# Patient Record
Sex: MALE | Race: WHITE
[De-identification: names, ages, dates, MRNs, and addresses within clinical notes are randomized per-mention and may not be internally consistent; named-entity substitution may affect disease eponyms.]

---

## 2019-01-15 ENCOUNTER — HOSPITAL ENCOUNTER (INPATIENT)
Dept: HOSPITAL 91 - TEL | Age: 74
LOS: 7 days | Discharge: HOME HEALTH | DRG: 308 | End: 2019-01-22
Payer: COMMERCIAL

## 2019-01-15 ENCOUNTER — HOSPITAL ENCOUNTER (INPATIENT)
Dept: HOSPITAL 10 - E/R | Age: 74
LOS: 7 days | Discharge: HOME HEALTH | DRG: 308 | End: 2019-01-22
Attending: INTERNAL MEDICINE | Admitting: INTERNAL MEDICINE
Payer: COMMERCIAL

## 2019-01-15 VITALS
HEIGHT: 68 IN | WEIGHT: 132.94 LBS | HEIGHT: 68 IN | BODY MASS INDEX: 20.15 KG/M2 | BODY MASS INDEX: 20.15 KG/M2 | WEIGHT: 132.94 LBS

## 2019-01-15 DIAGNOSIS — I48.91: Primary | ICD-10-CM

## 2019-01-15 DIAGNOSIS — I47.2: ICD-10-CM

## 2019-01-15 DIAGNOSIS — I50.23: ICD-10-CM

## 2019-01-15 DIAGNOSIS — I25.10: ICD-10-CM

## 2019-01-15 DIAGNOSIS — N17.9: ICD-10-CM

## 2019-01-15 DIAGNOSIS — R09.02: ICD-10-CM

## 2019-01-15 DIAGNOSIS — R51: ICD-10-CM

## 2019-01-15 DIAGNOSIS — I11.0: ICD-10-CM

## 2019-01-15 DIAGNOSIS — M48.54XA: ICD-10-CM

## 2019-01-15 DIAGNOSIS — F17.200: ICD-10-CM

## 2019-01-15 DIAGNOSIS — I24.8: ICD-10-CM

## 2019-01-15 DIAGNOSIS — I71.4: ICD-10-CM

## 2019-01-15 LAB
ADD MAN DIFF?: NO
ALANINE AMINOTRANSFERASE: 49 IU/L (ref 13–69)
ALBUMIN/GLOBULIN RATIO: 1.39
ALBUMIN: 3.9 G/DL (ref 3.3–4.9)
ALKALINE PHOSPHATASE: 2146 IU/L (ref 42–121)
AMPHETAMINE/METHAMPHETAMINE: NEGATIVE
ANION GAP: 13 (ref 5–13)
ASPARTATE AMINO TRANSFERASE: 138 IU/L (ref 15–46)
B-TYPE NATRIURETIC PEPTIDE: 5670 PG/ML (ref 0–125)
BARBITURATES: NEGATIVE
BASOPHIL #: 0 10^3/UL (ref 0–0.1)
BASOPHILS %: 0.3 % (ref 0–2)
BENZODIAZEPINES: NEGATIVE
BILIRUBIN,DIRECT: 0 MG/DL (ref 0–0.2)
BILIRUBIN,TOTAL: 0.9 MG/DL (ref 0.2–1.3)
BLOOD UREA NITROGEN: 52 MG/DL (ref 7–20)
CALCIUM: 9.5 MG/DL (ref 8.4–10.2)
CANNABINOIDS: NEGATIVE
CARBON DIOXIDE: 26 MMOL/L (ref 21–31)
CHLORIDE: 104 MMOL/L (ref 97–110)
CK INDEX: 2.9
CK-MB: 45.3 NG/ML (ref 0–2.4)
COCAINE: NEGATIVE
CREATINE KINASE: 1570 IU/L (ref 23–200)
CREATININE: 1.32 MG/DL (ref 0.61–1.24)
D-DIMER: 8518.92 NG/ML (ref ?–460)
EOSINOPHILS #: 0 10^3/UL (ref 0–0.5)
EOSINOPHILS %: 0 % (ref 0–7)
ETHANOL: < 10 MG/DL (ref 0–0)
FREE THYROXINE INDEX (CALC): 1.72 UG/ML (ref 0.65–3.89)
GLOBULIN: 2.8 G/DL (ref 1.3–3.2)
GLUCOSE: 94 MG/DL (ref 70–220)
HEMATOCRIT: 51.5 % (ref 42–52)
HEMOGLOBIN: 17.1 G/DL (ref 14–18)
IMMATURE GRANS #M: 0.05 10^3/UL (ref 0–0.03)
IMMATURE GRANS % (M): 0.8 % (ref 0–0.43)
INR: 1.19
LYMPHOCYTES #: 1.1 10^3/UL (ref 0.8–2.9)
LYMPHOCYTES %: 17.7 % (ref 15–51)
MEAN CORPUSCULAR HEMOGLOBIN: 30.9 PG (ref 29–33)
MEAN CORPUSCULAR HGB CONC: 33.2 G/DL (ref 32–37)
MEAN CORPUSCULAR VOLUME: 93.1 FL (ref 82–101)
MEAN PLATELET VOLUME: 12.4 FL (ref 7.4–10.4)
MONOCYTE #: 0.5 10^3/UL (ref 0.3–0.9)
MONOCYTES %: 7.9 % (ref 0–11)
NEUTROPHIL #: 4.5 10^3/UL (ref 1.6–7.5)
NEUTROPHILS %: 73.3 % (ref 39–77)
NUCLEATED RED BLOOD CELLS #: 0 10^3/UL (ref 0–0)
NUCLEATED RED BLOOD CELLS%: 0.3 /100WBC (ref 0–0)
OPIATES: NEGATIVE
PARTIAL THROMBOPLASTIN TIME: 27.4 SEC (ref 23–35)
PLATELET COUNT: 102 10^3/UL (ref 140–415)
POTASSIUM: 4.6 MMOL/L (ref 3.5–5.1)
PROTIME: 15.2 SEC (ref 11.9–14.9)
PT RATIO: 1.2
RED BLOOD COUNT: 5.53 10^6/UL (ref 4.7–6.1)
RED CELL DISTRIBUTION WIDTH: 15.7 % (ref 11.5–14.5)
SODIUM: 143 MMOL/L (ref 135–144)
T3 UPTAKE: 38.2 % (ref 23.5–40.5)
T4 (THYROXINE): 4.5 UG/DL (ref 5.5–11)
THYROID STIMULATING HORMONE: 4.07 MIU/L (ref 0.47–4.68)
TOTAL PROTEIN: 6.7 G/DL (ref 6.1–8.1)
TROPONIN-I: 0.09 NG/ML (ref 0–0.12)
TROPONIN-I: 0.09 NG/ML (ref 0–0.12)
WHITE BLOOD COUNT: 6.1 10^3/UL (ref 4.8–10.8)

## 2019-01-15 PROCEDURE — 82553 CREATINE MB FRACTION: CPT

## 2019-01-15 PROCEDURE — 80048 BASIC METABOLIC PNL TOTAL CA: CPT

## 2019-01-15 PROCEDURE — 78452 HT MUSCLE IMAGE SPECT MULT: CPT

## 2019-01-15 PROCEDURE — 83735 ASSAY OF MAGNESIUM: CPT

## 2019-01-15 PROCEDURE — 96374 THER/PROPH/DIAG INJ IV PUSH: CPT

## 2019-01-15 PROCEDURE — 84443 ASSAY THYROID STIM HORMONE: CPT

## 2019-01-15 PROCEDURE — 84484 ASSAY OF TROPONIN QUANT: CPT

## 2019-01-15 PROCEDURE — 80061 LIPID PANEL: CPT

## 2019-01-15 PROCEDURE — 84479 ASSAY OF THYROID (T3 OR T4): CPT

## 2019-01-15 PROCEDURE — 82803 BLOOD GASES ANY COMBINATION: CPT

## 2019-01-15 PROCEDURE — 36600 WITHDRAWAL OF ARTERIAL BLOOD: CPT

## 2019-01-15 PROCEDURE — 80307 DRUG TEST PRSMV CHEM ANLYZR: CPT

## 2019-01-15 PROCEDURE — 85378 FIBRIN DEGRADE SEMIQUANT: CPT

## 2019-01-15 PROCEDURE — 85610 PROTHROMBIN TIME: CPT

## 2019-01-15 PROCEDURE — 93017 CV STRESS TEST TRACING ONLY: CPT

## 2019-01-15 PROCEDURE — 82550 ASSAY OF CK (CPK): CPT

## 2019-01-15 PROCEDURE — 93306 TTE W/DOPPLER COMPLETE: CPT

## 2019-01-15 PROCEDURE — 93005 ELECTROCARDIOGRAM TRACING: CPT

## 2019-01-15 PROCEDURE — 84436 ASSAY OF TOTAL THYROXINE: CPT

## 2019-01-15 PROCEDURE — 84100 ASSAY OF PHOSPHORUS: CPT

## 2019-01-15 PROCEDURE — 90686 IIV4 VACC NO PRSV 0.5 ML IM: CPT

## 2019-01-15 PROCEDURE — 71275 CT ANGIOGRAPHY CHEST: CPT

## 2019-01-15 PROCEDURE — 83880 ASSAY OF NATRIURETIC PEPTIDE: CPT

## 2019-01-15 PROCEDURE — 85025 COMPLETE CBC W/AUTO DIFF WBC: CPT

## 2019-01-15 PROCEDURE — A9505 TL201 THALLIUM: HCPCS

## 2019-01-15 PROCEDURE — 71046 X-RAY EXAM CHEST 2 VIEWS: CPT

## 2019-01-15 PROCEDURE — 80053 COMPREHEN METABOLIC PANEL: CPT

## 2019-01-15 PROCEDURE — 71045 X-RAY EXAM CHEST 1 VIEW: CPT

## 2019-01-15 PROCEDURE — 70450 CT HEAD/BRAIN W/O DYE: CPT

## 2019-01-15 PROCEDURE — 85730 THROMBOPLASTIN TIME PARTIAL: CPT

## 2019-01-15 PROCEDURE — 96376 TX/PRO/DX INJ SAME DRUG ADON: CPT

## 2019-01-15 PROCEDURE — 99291 CRITICAL CARE FIRST HOUR: CPT

## 2019-01-15 PROCEDURE — A9500 TC99M SESTAMIBI: HCPCS

## 2019-01-15 PROCEDURE — 96375 TX/PRO/DX INJ NEW DRUG ADDON: CPT

## 2019-01-15 RX ADMIN — SODIUM CHLORIDE 1 ML: 9 INJECTION, SOLUTION INTRAMUSCULAR; INTRAVENOUS; SUBCUTANEOUS at 18:23

## 2019-01-15 RX ADMIN — DILTIAZEM HYDROCHLORIDE 1 MG: 5 INJECTION INTRAVENOUS at 19:50

## 2019-01-15 RX ADMIN — VASOPRESSIN 1 ML/S: 20 INJECTION, SOLUTION INTRAMUSCULAR; SUBCUTANEOUS at 18:23

## 2019-01-15 RX ADMIN — DILTIAZEM HYDROCHLORIDE 1 MG: 5 INJECTION INTRAVENOUS at 17:24

## 2019-01-15 RX ADMIN — DIGOXIN 1 MCG: 0.25 INJECTION INTRAMUSCULAR; INTRAVENOUS at 19:16

## 2019-01-15 RX ADMIN — DILTIAZEM HYDROCHLORIDE 1 MG: 5 INJECTION INTRAVENOUS at 18:50

## 2019-01-15 RX ADMIN — THIAMINE HYDROCHLORIDE 1 MLS/HR: 100 INJECTION, SOLUTION INTRAMUSCULAR; INTRAVENOUS at 19:58

## 2019-01-15 RX ADMIN — IODIXANOL 1 ML: 320 INJECTION, SOLUTION INTRAVASCULAR at 18:24

## 2019-01-15 RX ADMIN — DIGOXIN 1 MCG: 0.25 INJECTION INTRAMUSCULAR; INTRAVENOUS at 20:08

## 2019-01-15 RX ADMIN — FUROSEMIDE 1 MG: 10 INJECTION, SOLUTION INTRAVENOUS at 18:23

## 2019-01-15 NOTE — ERD
ER Documentation


Chief Complaint


Chief Complaint





SOB





HPI


The patient is of 73-year-old male, presenting with acute dyspnea for the last 


week, worse today.  He also complains of palpitation intermittently for the last


month, denies syncope, near syncope, facial pain, neck pain, chest pain, 


abdominal pain, vomiting, complains of constipation and dysuria.  He smokes, 


denies drinking





Past medical history: CAD, hypertension


Past surgical history: None





ROS


All systems reviewed and are negative except as per history of present illness.





Medications


Home Meds


Reported Medications


Olmesartan-Amlodipine-HCTZ (Tribenzor) 40-10-25 Mg Tablet, 1 TAB PO DAILY, TAB


   1/15/19


Discontinued Reported Medications


Clonidine Hcl* (Clonidine Hcl*) 0.1 Mg Tab, 0.1 MG PO BID


   10/1/11


Amlodipine Besylate* (Amlodipine Besylate*) 10 Mg Tablet, 10 MG PO DAILY


   10/1/11


Pantoprazole* (Protonix*) 20 Mg Tablet.dr, 40 MG PO BID, 0 Refills


   10/1/11





Allergies


Allergies:  


Coded Allergies:  


     No Known Drug Allergies (Verified  Allergy, Unknown, 1/15/19)





PMhx/Soc


History of Surgery:  No


Anesthesia Reaction:  No


Hx Neurological Disorder:  No


Hx Respiratory Disorders:  No


Hx Cardiac Disorders:  Yes (HYPERTENSION )


Hx Psychiatric Problems:  No


Hx Miscellaneous Medical Probl:  No


Hx Alcohol Use:  No


Hx Substance Use:  No


Hx Tobacco Use:  No





Physical Exam


Vitals





Vital Signs


  Date      Temp  Pulse  Resp  B/P (MAP)   Pulse Ox  O2          O2 Flow    FiO2


Time                                                 Delivery    Rate


   1/15/19          122    20     131/104        94  Room Air


     19:45                          (113)


   1/15/19                                           Nasal


     18:35                                           Cannula


   1/15/19          115    20      133/95        96  Room Air


     17:07                          (108)


   1/15/19  98.1     61    18     183/101        95


     14:46                          (128)





Physical Exam


 Const:      No acute distress.


 Head:        Atraumatic.


 Eyes:       Normal Conjunctiva.


 ENT:         Normal External Ears, Nose and Mouth.


 Neck:        Full range of motion.  No meningismus.


 Resp:         Bibasilar crackle


 Cardio:       Irregularly irregular tachy


 Abd:         Soft,  non distended, normal bowel sounds, non tender.


 Skin:         No petechiae or rashes.


 Back:        No midline or flank tenderness.


 Ext:          No cyanosis, or edema.


 Neur:        Awake and alert. No focal deficit


 Psych:        Normal Mood and Affect.


Result Diagram:  


1/15/19 1639                                                                    


           1/15/19 1639





Results 24 hrs





Laboratory Tests


       Test
                                 1/15/19
16:39  1/15/19
19:20


       White Blood Count                      6.1 10^3/ul


       Red Blood Count                       5.53 10^6/ul


       Hemoglobin                               17.1 g/dl


       Hematocrit                                  51.5 %


       Mean Corpuscular Volume                    93.1 fl


       Mean Corpuscular Hemoglobin                30.9 pg


       Mean Corpuscular Hemoglobin
Concent     33.2 g/dl 
  



       Red Cell Distribution Width                 15.7 %


       Platelet Count                         102 10^3/UL


       Mean Platelet Volume                       12.4 fl


       Immature Granulocytes %                    0.800 %


       Neutrophils %                               73.3 %


       Lymphocytes %                               17.7 %


       Monocytes %                                  7.9 %


       Eosinophils %                                0.0 %


       Basophils %                                  0.3 %


       Nucleated Red Blood Cells %            0.3 /100WBC


       Immature Granulocytes #              0.050 10^3/ul


       Neutrophils #                          4.5 10^3/ul


       Lymphocytes #                          1.1 10^3/ul


       Monocytes #                            0.5 10^3/ul


       Eosinophils #                          0.0 10^3/ul


       Basophils #                            0.0 10^3/ul


       Nucleated Red Blood Cells #            0.0 10^3/ul


       Prothrombin Time                          15.2 Sec


       Prothrombin Time Ratio                         1.2


       INR International Normalized
Ratio           1.19 
  



       Activated Partial
Thromboplast Time      27.4 Sec 
  



       D-Dimer                              8518.92 ng/ml


       D-Dimer Comment


       Sodium Level                            143 mmol/L


       Potassium Level                         4.6 mmol/L


       Chloride Level                          104 mmol/L


       Carbon Dioxide Level                     26 mmol/L


       Anion Gap                                       13


       Blood Urea Nitrogen                       52 mg/dl


       Creatinine                              1.32 mg/dl


       Est Glomerular Filtrat Rate
mL/min    mL/min 
       



       Glucose Level                             94 mg/dl


       Calcium Level                            9.5 mg/dl


       Total Bilirubin                          0.9 mg/dl


       Direct Bilirubin                        0.00 mg/dl


       Indirect Bilirubin                       0.9 mg/dl


       Aspartate Amino Transf
(AST/SGOT)        138 IU/L 
  



       Alanine Aminotransferase
(ALT/SGPT)       49 IU/L 
  



       Alkaline Phosphatase                     2146 IU/L


       Troponin I                             0.085 ng/ml


       B-Type Natriuretic Peptide              5670 PG/ML


       Total Protein                             6.7 g/dl


       Albumin                                   3.9 g/dl


       Globulin                                 2.80 g/dl


       Albumin/Globulin Ratio                        1.39


       Thyroid Stimulating Hormone
(TSH)     4.070 MIU/L 
  



       Free Thyroxine Index                    1.72 ug/ml


       Thyroxine (T4)                           4.5 ug/dl


       Triiodothyronine (T3) Uptake                38.2 %


       Ethyl Alcohol Level                  < 10.0 mg/dl


       Urine Opiates Screen                                 NEGATIVE


       Urine Barbiturates                                   NEGATIVE


       Urine Amphetamines Screen                            NEGATIVE


       Urine Benzodiazepines Screen                         NEGATIVE


       Urine Cocaine Screen                                 NEGATIVE


       Urine Cannabinoids                                   NEGATIVE





Current Medications


 Medications
   Dose
          Sig/Moon
       Start Time
   Status  Last


 (Trade)       Ordered        Route
 PRN     Stop Time              Admin
Dose


                              Reason                                Admin


 Diltiazem      25 mg          STK-MED        1/15/19       DC       



HCl
                          ONCE
 .ROUTE
  16:54



(Cardizem Iv)                                1/15/19 16:55


 Diltiazem      5 mg           ONCE  ONCE
    1/15/19       DC           1/15/19


HCl
                          IV
            17:30
                       17:24



(Cardizem Iv)                                1/15/19 17:31


 Furosemide
    40 mg          ONCE  ONCE
    1/15/19       DC           1/15/19


(Lasix)                       IV
            18:00
                       18:23



                                             1/15/19 18:08


 IV Flush
      10 ml          STK-MED        1/15/19       DC           1/15/19


(NS 10 ml)                    ONCE
 .ROUTE
  17:52
                       18:23



                                             1/15/19 17:53


 Sodium         100 ml @ ud    STK-MED        1/15/19       DC           1/15/19


Chloride                      ONCE
 .ROUTE
  17:52
                       18:23



                                             1/15/19 17:53


 Iodixanol
     100 ml         STK-MED        1/15/19       DC           1/15/19


(Visipaque                    ONCE
 .ROUTE
  17:52
                       18:24



Locm)                                        1/15/19 17:53


 Diltiazem      10 mg          ONCE  ONCE
    1/15/19       DC           1/15/19


HCl
                          IV
            19:00
                       18:50



(Cardizem Iv)                                1/15/19 19:01


 Digoxin
       250 mcg        ONCE  ONCE
    1/15/19       DC           1/15/19


(Digoxin)                     IV
            19:00
                       19:16



                                             1/15/19 19:08


 Diltiazem      5 mg           ONCE  ONCE
    1/15/19       DC           1/15/19


HCl
                          IV
            20:00
                       19:50



(Cardizem Iv)                                1/15/19 20:01


 Digoxin
       250 mcg        ONCE  ONCE
    1/15/19       DC           1/15/19


(Digoxin)                     IV
            20:00
                       20:08



                                             1/15/19 20:01


 Diltiazem      30 mg          Q6
 PO
        19       UNV      



HCl
                                         00:00



(Cardizem)


 Metoprolol
    5 mg           Q6H  PRN
      1/15/19       UNV      



Tartrate
                     IV
            20:00



(Lopressor)                   TACHYARDIA


 Enoxaparin     70 mg          Q12
 SC
       1/15/19       DC       



Sodium
                                      21:00



(Lovenox)                                    1/15/19 21:00


 Enoxaparin     40 mg          DAILY
 SC
     19       UNV      



Sodium
                                      09:00



(Lovenox)


 Sodium         1,000 ml @ 
   Q50W15Y
 IV
   1/15/19       UNV      



Chloride       75 mls/hr                     19:58



 IV Flush
      3 ml           PER            1/15/19       UNV      



(NS 3 ml)                     PROTOCOL
 IV
  20:00



 Ondansetron    4 mg           Q6H  PRN
      1/15/19       UNV      



HCl
  (Zofran                 IV
 NAUSEA     20:00



Inj)                          AND/OR


                              VOMITING


                1 tab          Q5M  PRN
      1/15/19       UNV      



Nitroglycerin                 SL
 CHEST      20:00




                             PAIN


(Nitroglyceri


n
  (Sl Tab)


0.4 Mg)


                650 mg         Q6H  PRN
      1/15/19       UNV      



Acetaminophen                 PO
 PAIN       20:00




  (Tylenol                   LEVEL 1-3 OR


Tab)                          FEVER


 Morphine       2 mg           Q4H  PRN
      1/15/19       UNV      



Sulfate
                      IV
 PAIN       20:00



(morphine)                    LEVEL 7-10


 Docusate       100 mg         Q12H  PRN
     1/15/19       UNV      



Sodium
                       PO
            20:00



(Colace)                      CONSTIPATION


 Magnesium
     30 ml          DAILY  PRN
    1/15/19       UNV      



Hydroxide
                    PO
            20:00



(Milk Of Mag)                 CONSTIPATION


 Bisacodyl
     10 mg          DAILY  PRN
    1/15/19       UNV      



(Dulcolax                     DC
            20:00



Supp)                         CONSTIPATION








Procedures/[place] [address]


                        Radiology Main Line: [phone]





                            DIAGNOSTIC IMAGING REPORT





Patient: ZEYAD STYLES   : 1945   Age: 73  Sex: M                        


       MR #:    [id number]   [id number]    DOS: 01/15/19 1734


Ordering MD: DAVION BUCHANAN MD   Location:  E/R   Room/Bed:                    


                       


                                        


PROCEDURE:   CT brain without contrast


 


CLINICAL INDICATION:   Headaches following a fall


 


TECHNIQUE:   A CT of the brain was performed utilizing axial sections from the 


skull base through the vertex without contrast. Sagittal and coronal images were


also reformatted. DICOM images are available. One or more of the following dose 


reduction techniques were used: Automated exposure control, adjustment of the mA


and/or kV according to patient size, use of iterative reconstruction technique.


The exam CTDIvol = 38.46 mGy and  DLP = 634.23 mGy-cm. 


 


COMPARISON:   None available 


 


FINDINGS:


 


No acute intracranial hemorrhage is identified.  There is no mass effect or 


midline shift.  No extra-axial fluid collection is seen.  The ventricles and 


sulci are larger in size and configuration for the patient's provided age of 73 


years consistent with advanced generalized atrophy.  Low attenuation of the 


subcortical and periventricular white matter is nonspecific but most likely 


nathan small vessel ischemic disease.  Gray-white differentiation is preserved with


no findings to suggest an acute ischemic infarct.


The fourth ventricle is midline and there is no density alteration within the 


sakina or cerebellum.  


 


The osseous structures are unremarkable.  The mastoid air cells and visualized 


paranasal sinuses are clear. Atherosclerotic calcification of the cavernous 


internal carotid arteries is present. 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 


zygoma and right lateral orbit are likely shrapnel from a remote injury


 


RPTAT:HJJR


 


IMPRESSION:


 


1. Advanced atrophy for the patient's provided age without evidence for acute 


intracranial abnormality or mass effect.  


2. Metallic shrapnel foreign bodies lateral to the right orbit adjacent to the 


zygoma.


3. Cavernous internal carotid artery atherosclerotic calcification.


_____________________________________________ 


Physician Sumi           Date    Time 


Electronically viewed and signed by Physician Sumi on 01/15/2019 18:18 


 


D:  01/15/2019 18:18  T:  01/15/2019 18:18


JR/





CC: DAVION BUCHANAN MD





[phone]


                          [place] [address]


                        Radiology Main Line: [phone]





                            DIAGNOSTIC IMAGING REPORT





Patient: ZEYAD STYLES   : 1945   Age: 73  Sex: M                        


       MR #:    [id number]   Acct #:   [id number]    DOS: 01/15/19 1627


Ordering MD: DAVION BUCHANAN MD   Location:  E/R   Room/Bed:                    


                       


                                        


PROCEDURE:   XR Chest. 


 


CLINICAL INDICATION:  Shortness of breath


 


TECHNIQUE:   Portable single view of the chest


 


COMPARISON:   None. 


 


FINDINGS:


Enlarged cardiopericardial silhouette. Atherosclerotic change of the aorta. 


Slight prominence of the paratracheal soft tissues which may be due to ectatic 


vasculature. No definite acute infiltrate, pleural effusion, or overt congestive


heart failure. No slight rightward scoliosis which may in part be positional.. 


 


IMPRESSION:


Enlarged cardiopericardial silhouette and atherosclerotic change of the aorta.. 


 


RPTAT: HLBE


_____________________________________________ 


Physician Miko           Date    Time 


Electronically viewed and signed by Dalryn Rosas Physician on 01/15/2019 


17:22 


 


D:  01/15/2019 17:22  T:  01/15/2019 17:22


LE/





CC: DAVION BUCHANAN MD





[phone]


                          [place] [address]


                        Radiology Main Line: [phone]





                            DIAGNOSTIC IMAGING REPORT





Patient: ZEYAD STYLES   : 1945   Age: 73  Sex: M                        


       MR #:    [id number]   [id number]    DOS: 01/15/19 0000


Ordering MD: DAVION BUCHANAN MD   Location:  E/R   Room/Bed:                    


                       


                                        


PROCEDURE:  CT angiogram chest


CLINICAL INDICATION:  Shortness of breath


TECHNIQUE:   Transaxial slices were obtained throughout the chest with IV 


contrast and CT chest angiogram was obtained. Additional sagittal and coronal 


MPR  images were obtained.. 3-D images were  obtained. One of more of the 


following dose reduction techniques were utilized: -automatic exposure control.-


adjustment of the mA and/or kV according to patient size. -Use of iterative 


reconstruction technique.DICOM images available


Contrast: 80 cc Visipaque 320


Radiation Dose: CTDI is 10.77 mGy. DLP is 413.51 mGy-cm.


COMPARISON:   Chest one-view 


FINDINGS:


Normal opacification of the mediastinal structures is seen and no intraluminal 


filling defects is noted in the pulmonary arterial tree up to 3rd order branches


to suggest pulmonary embolism. Degenerate changes noted in the lower cervical 


spine. Cardiomegaly, enlarged distal ascending aorta measuring 4.7 cm consistent


with aneurysm. Coronary artery calcifications are seen. Small retrocardiac 


hiatal hernia noted. Degenerative changes noted in the lower dorsal spine. T11 


compression fracture is noted. No significant mediastinal adenopathy seen.


Lung windows demonstrate symmetric lung volumes. Dilated bronchi to the lower 


lobes are seen. No subpleural blebs, pneumothorax or pleural effusion noted. No 


endobronchial lesions seen. No displaced rib fracture noted.


CT abdomen: Liver demonstrates low density lesion near the dome suggestive of 


cyst. Engorged hepatic veins are seen. Nondistended stomach, normal spleen, 


calcified suprarenal abdominal aorta is noted. Abnormal increased density to the


thoracolumbar spine


IMPRESSION:


 No evidence of pulmonary embolism noted.


Ascending aortic aneurysm. Enlarged main pulmonary artery suggestive of arterial


hypertension.


Cardiomegaly is seen without pericardial or pleural effusion. Cannot rule out 


underlying heart failure.


Abnormal patchy density to the thoracolumbar spine suspicious for metabolic or 


metastatic etiology.


Underlying degenerative changes with T11 compression fracture also seen.


RPTAT:AAOO


_____________________________________________ 


Physician Dana           Date    Time 


Electronically viewed and signed by Physician Dana on 01/15/2019 


18:38 


 


D:  01/15/2019 18:38  T:  01/15/2019 18:38


MB/





CC: DAVION BUCHANAN MD





[phone]





EKG:                           Read by emergency physician


Rate/Rhythm:   Atrial    Fibrillation 128 beats/min


QRS, ST, T-waves:    No ST elevation, no T inversion, PVC, LAFB


Impression:             Abnormal   EKG





MEDICAL MAKING DECISION: The patient is a 73-year-old male, presenting with 


acute new onset atrial fibrillation, acute CHF exacerbation, ascending aortic 


aneurysm, possible metastatic disease.  He was treated with Cardizem 10 mg IV 


x2, digoxin 0.25 mg IV x2 acute new onset atrial fibrillation with RVR, Lasix 40


mg IV for acute CHF with good response.





The differential diagnoses considered include but are not limited to asthma, 


COPD, pneumonia, pulmonary embolus, pleural effusion, congestive heart failure, 


cardiac arrhythmia.





Critical Care:


   Time:       35 minutes excluding all billable procedures.


   Treatments/Evaluations:   Close monitoring and treatment of unstable vital 


signs, cardiorespiratory, and neurologic status, while maintaining tight balance


of fluid, respiratory, and cardiac interventions.





Departure


Diagnosis:  


   Primary Impression:  


   New onset atrial fibrillation


   Additional Impressions:  


   CHF (congestive heart failure)


   Abdominal aortic aneurysm


   Ascending aortic aneurysm


   Thrombocytopenia


Condition:  Serious


Comments


I discussed the findings with the patient. I discussed the patient with the 


hospitalist Dr Schmitt at 6:15 pm.  who was made aware of the lab, the treatment, 


the patient condition. The patient is admitted to [facility]





Disclaimer: Inadvertent spelling and grammatical errors are likely due to 


EHR/dictation software use and do not reflect on the overall quality of patient 


care. Also, please note that the electronic time recorded on this note does not 


necessarily reflect the actual time of the patient encounter.











DAVION BUCHANAN MD              Dayo 15, 2019 16:13

## 2019-01-15 NOTE — NUR
Procedure Ordered: CHEST ANGIO





Reason for Exam Today: SOB





Previous Exams:





Allergies:NKA





Current Medications Taken:

Glucophage ( )  Metformin ( )





Previous reaction to contrast media:  Yes ( ) No ( )



Pregnant:       Yes ( ) No ( X)             Asthma:           Yes ( ) No (X )

Diabetes:       Yes ( ) No (X )             Myeloma:          Yes ( ) No (X )

Heart Disease:  Yes (X ) No ( )             Cardiac Disease:  Yes (X ) No ( )

Kidney Disease: Yes ( ) No (X )             Vascular Disease: Yes ( ) No (X )

_______________________________________________________________________________

Patient Teaching done:  Yes (X ) No ( )      Used: Yes ( ) No ( )

                                           Name of :                              
                              Language Used:



As part of the test requested by your doctor, contrast media may be injected into your vein 
while the x-rays are being taken. Occasionally, reactions from IV contrast may occur. The 
physician and staff of this hospital are trained to treat these reactions.

_______________________________________________________________________________

Select the type of Contrast that will be given to patient:



Isovue 300 ( )   Isovue 370 ( ) Visipaque ( X) Cystografin ( ) 



Gastrographin ( ) Redi-cat ( ) Volumen ( )





Amount of contrast to be given:    90ML              IV (X )  PO ( )





Date given: 1/15/19





Lab Values:   BUN:   52         Creatinine: 1.32



Reason why contrast cannot be given:





Location of patient pre-procedure: ER 7





Location of patient post procedure: ER 7

## 2019-01-16 VITALS — DIASTOLIC BLOOD PRESSURE: 68 MMHG | RESPIRATION RATE: 18 BRPM | SYSTOLIC BLOOD PRESSURE: 135 MMHG | HEART RATE: 79 BPM

## 2019-01-16 VITALS — SYSTOLIC BLOOD PRESSURE: 116 MMHG | HEART RATE: 58 BPM | RESPIRATION RATE: 17 BRPM | DIASTOLIC BLOOD PRESSURE: 72 MMHG

## 2019-01-16 VITALS — HEART RATE: 105 BPM | DIASTOLIC BLOOD PRESSURE: 99 MMHG | SYSTOLIC BLOOD PRESSURE: 131 MMHG | RESPIRATION RATE: 18 BRPM

## 2019-01-16 VITALS — SYSTOLIC BLOOD PRESSURE: 108 MMHG | RESPIRATION RATE: 17 BRPM | DIASTOLIC BLOOD PRESSURE: 61 MMHG | HEART RATE: 71 BPM

## 2019-01-16 VITALS — HEART RATE: 99 BPM

## 2019-01-16 VITALS — RESPIRATION RATE: 18 BRPM | DIASTOLIC BLOOD PRESSURE: 62 MMHG | HEART RATE: 60 BPM | SYSTOLIC BLOOD PRESSURE: 123 MMHG

## 2019-01-16 VITALS — HEART RATE: 84 BPM

## 2019-01-16 VITALS — HEART RATE: 93 BPM | DIASTOLIC BLOOD PRESSURE: 63 MMHG | SYSTOLIC BLOOD PRESSURE: 137 MMHG | RESPIRATION RATE: 18 BRPM

## 2019-01-16 VITALS — DIASTOLIC BLOOD PRESSURE: 86 MMHG | RESPIRATION RATE: 18 BRPM | HEART RATE: 60 BPM | SYSTOLIC BLOOD PRESSURE: 127 MMHG

## 2019-01-16 VITALS — HEART RATE: 66 BPM | SYSTOLIC BLOOD PRESSURE: 115 MMHG | DIASTOLIC BLOOD PRESSURE: 73 MMHG | RESPIRATION RATE: 17 BRPM

## 2019-01-16 VITALS — HEART RATE: 102 BPM

## 2019-01-16 VITALS — RESPIRATION RATE: 16 BRPM | SYSTOLIC BLOOD PRESSURE: 90 MMHG | DIASTOLIC BLOOD PRESSURE: 57 MMHG | HEART RATE: 73 BPM

## 2019-01-16 VITALS — HEART RATE: 114 BPM

## 2019-01-16 VITALS — HEART RATE: 97 BPM

## 2019-01-16 VITALS — HEART RATE: 107 BPM

## 2019-01-16 LAB
ABNORMAL IP MESSAGE: 1
ADD MAN DIFF?: NO
ALANINE AMINOTRANSFERASE: 20 IU/L (ref 13–69)
ALBUMIN/GLOBULIN RATIO: 1.32
ALBUMIN: 3.7 G/DL (ref 3.3–4.9)
ALKALINE PHOSPHATASE: 2067 IU/L (ref 42–121)
ANION GAP: 14 (ref 5–13)
ASPARTATE AMINO TRANSFERASE: 181 IU/L (ref 15–46)
BASOPHIL #: 0 10^3/UL (ref 0–0.1)
BASOPHILS %: 0.4 % (ref 0–2)
BILIRUBIN,DIRECT: 0 MG/DL (ref 0–0.2)
BILIRUBIN,TOTAL: 1 MG/DL (ref 0.2–1.3)
BLOOD UREA NITROGEN: 49 MG/DL (ref 7–20)
CALCIUM: 9 MG/DL (ref 8.4–10.2)
CARBON DIOXIDE: 27 MMOL/L (ref 21–31)
CHLORIDE: 103 MMOL/L (ref 97–110)
CHOL/HDL RATIO: 4.3 RATIO
CHOL/HDL RATIO: 5.7 RATIO
CHOLESTEROL: 160 MG/DL (ref 100–200)
CHOLESTEROL: 168 MG/DL (ref 100–200)
CK INDEX: 2.2
CK INDEX: 2.3
CK INDEX: 2.7
CK-MB: 33.3 NG/ML (ref 0–2.4)
CK-MB: 35.7 NG/ML (ref 0–2.4)
CK-MB: 39.9 NG/ML (ref 0–2.4)
CREATINE KINASE: 1496 IU/L (ref 23–200)
CREATINE KINASE: 1507 IU/L (ref 23–200)
CREATINE KINASE: 1533 IU/L (ref 23–200)
CREATININE: 1.25 MG/DL (ref 0.61–1.24)
EOSINOPHILS #: 0 10^3/UL (ref 0–0.5)
EOSINOPHILS %: 0.1 % (ref 0–7)
GLOBULIN: 2.8 G/DL (ref 1.3–3.2)
GLUCOSE: 68 MG/DL (ref 70–220)
HDL CHOLESTEROL: 29 MG/DL (ref 31–75)
HDL CHOLESTEROL: 37 MG/DL (ref 31–75)
HEMATOCRIT: 52.5 % (ref 42–52)
HEMOGLOBIN: 17.8 G/DL (ref 14–18)
IMMATURE GRANS #M: 0.11 10^3/UL (ref 0–0.03)
IMMATURE GRANS % (M): 1.6 % (ref 0–0.43)
LDL CHOLESTEROL,CALCULATED: 105 MG/DL
LDL CHOLESTEROL,CALCULATED: 88 MG/DL
LYMPHOCYTES #: 1.2 10^3/UL (ref 0.8–2.9)
LYMPHOCYTES %: 17.1 % (ref 15–51)
MAGNESIUM: 2.1 MG/DL (ref 1.7–2.5)
MEAN CORPUSCULAR HEMOGLOBIN: 31.3 PG (ref 29–33)
MEAN CORPUSCULAR HGB CONC: 33.9 G/DL (ref 32–37)
MEAN CORPUSCULAR VOLUME: 92.3 FL (ref 82–101)
MEAN PLATELET VOLUME: 12.6 FL (ref 7.4–10.4)
MONOCYTE #: 0.6 10^3/UL (ref 0.3–0.9)
MONOCYTES %: 8.5 % (ref 0–11)
NEUTROPHIL #: 4.9 10^3/UL (ref 1.6–7.5)
NEUTROPHILS %: 72.3 % (ref 39–77)
NUCLEATED RED BLOOD CELLS #: 0.1 10^3/UL (ref 0–0)
NUCLEATED RED BLOOD CELLS%: 0.9 /100WBC (ref 0–0)
PLATELET COUNT: 95 10^3/UL (ref 140–415)
POSITIVE DIFF: (no result)
POTASSIUM: 4.1 MMOL/L (ref 3.5–5.1)
RED BLOOD COUNT: 5.69 10^6/UL (ref 4.7–6.1)
RED CELL DISTRIBUTION WIDTH: 16.5 % (ref 11.5–14.5)
SODIUM: 144 MMOL/L (ref 135–144)
TOTAL PROTEIN: 6.5 G/DL (ref 6.1–8.1)
TRIGLYCERIDES: 170 MG/DL (ref 0–149)
TRIGLYCERIDES: 173 MG/DL (ref 0–149)
TROPONIN-I: 0.11 NG/ML (ref 0–0.12)
TROPONIN-I: 0.12 NG/ML (ref 0–0.12)
TROPONIN-I: 0.12 NG/ML (ref 0–0.12)
WHITE BLOOD COUNT: 6.7 10^3/UL (ref 4.8–10.8)

## 2019-01-16 RX ADMIN — METOPROLOL TARTRATE SCH MG: 25 TABLET, FILM COATED ORAL at 13:47

## 2019-01-16 RX ADMIN — APIXABAN SCH MG: 5 TABLET, FILM COATED ORAL at 21:12

## 2019-01-16 RX ADMIN — APIXABAN 1 MG: 5 TABLET, FILM COATED ORAL at 21:12

## 2019-01-16 RX ADMIN — METOPROLOL TARTRATE 1 MG: 25 TABLET ORAL at 13:47

## 2019-01-16 RX ADMIN — METOPROLOL TARTRATE 1 MG: 25 TABLET ORAL at 22:06

## 2019-01-16 RX ADMIN — DILTIAZEM HYDROCHLORIDE 1 MG: 30 TABLET, FILM COATED ORAL at 11:23

## 2019-01-16 RX ADMIN — DILTIAZEM HYDROCHLORIDE SCH MG: 30 TABLET, FILM COATED ORAL at 11:23

## 2019-01-16 RX ADMIN — METOPROLOL TARTRATE 1 MG: 5 INJECTION, SOLUTION INTRAVENOUS at 00:22

## 2019-01-16 RX ADMIN — ENOXAPARIN SODIUM 1 MG: 100 INJECTION SUBCUTANEOUS at 08:32

## 2019-01-16 RX ADMIN — FUROSEMIDE 1 MG: 10 INJECTION, SOLUTION INTRAVENOUS at 13:47

## 2019-01-16 RX ADMIN — DILTIAZEM HYDROCHLORIDE 1 MG: 30 TABLET, FILM COATED ORAL at 06:17

## 2019-01-16 RX ADMIN — DILTIAZEM HYDROCHLORIDE 1 MG: 30 TABLET, FILM COATED ORAL at 02:24

## 2019-01-16 RX ADMIN — NICOTINE SCH PATCH: 21 PATCH, EXTENDED RELEASE TRANSDERMAL at 13:47

## 2019-01-16 RX ADMIN — DILTIAZEM HYDROCHLORIDE SCH MG: 30 TABLET, FILM COATED ORAL at 06:17

## 2019-01-16 RX ADMIN — METOPROLOL TARTRATE PRN MG: 5 INJECTION, SOLUTION INTRAVENOUS at 00:22

## 2019-01-16 RX ADMIN — ASPIRIN 1 MG: 325 TABLET, DELAYED RELEASE ORAL at 13:45

## 2019-01-16 RX ADMIN — METOPROLOL TARTRATE SCH MG: 25 TABLET, FILM COATED ORAL at 22:06

## 2019-01-16 RX ADMIN — DILTIAZEM HYDROCHLORIDE SCH MG: 30 TABLET, FILM COATED ORAL at 02:24

## 2019-01-16 RX ADMIN — FUROSEMIDE 1 MG: 10 INJECTION, SOLUTION INTRAVENOUS at 17:42

## 2019-01-16 RX ADMIN — NICOTINE 1 PATCH: 21 PATCH, EXTENDED RELEASE TRANSDERMAL at 13:47

## 2019-01-16 NOTE — CONS
Date/Time of Note


Date/Time of Note


DATE: 1/16/19 


TIME: 12:13





Assessment/Plan


Newly diagnosed atrial fibrillation


Acute decompensated congestive heart failure


Renal dysfunction


Hypertension





-Patient with newly diagnosed atrial fibrillation.  We will start the patient on


beta-blocker, check echocardiogram.  Start anticoagulation.


-Troponins are mildly elevated, would follow trend and based on results, would 


decide on further ischemic workup.


-Continue aspirin therapy, no statin at the current time given abnormal LFTs.


Result Diagram:  


1/16/19 0744                                                                    


           1/16/19 0747





Results 24hrs





Laboratory Tests


Test
                 1/15/19
16:39  1/15/19
19:20  1/15/19
23:00  1/16/19
07:44


White Blood Count             6.1                                          6.7


Red Blood Count              5.53                                         5.69


Hemoglobin                   17.1                                         17.8


Hematocrit                   51.5                                        52.5  H


Mean Corpuscular             93.1                                         92.3


Volume


Mean Corpuscular             30.9                                         31.3


Hemoglobin


Mean Corpuscular            33.2  
  
              
                    33.9  



Hemoglobin
Concent


Red Cell                    15.7  H                                      16.5  H


Distribution Width


Platelet Count               102  L                                        95  L


Mean Platelet Volume        12.4  H                                      12.6  H


Immature                   0.800  H                                     1.600  H


Granulocytes %


Neutrophils %                73.3                                         72.3


Lymphocytes %                17.7                                         17.1


Monocytes %                   7.9                                          8.5


Eosinophils %                 0.0                                          0.1


Basophils %                   0.3                                          0.4


Nucleated Red Blood          0.3  H                                       0.9  H


Cells %


Immature                   0.050  H                                     0.110  H


Granulocytes #


Neutrophils #                 4.5                                          4.9


Lymphocytes #                 1.1                                          1.2


Monocytes #                   0.5                                          0.6


Eosinophils #                 0.0                                          0.0


Basophils #                   0.0                                          0.0


Nucleated Red Blood           0.0                                         0.1  H


Cells #


Prothrombin Time            15.2  H


Prothrombin Time              1.2


Ratio


[employer]           1.19  
  
              
              



Normalized
Ratio


Activated                   27.4  
  
              
              



Partial
Thromboplast


Time


D-Dimer                  8518.92  H


D-Dimer Comment


Sodium Level                  143


Potassium Level               4.6


Chloride Level                104


Carbon Dioxide Level           26


Anion Gap                      13


Blood Urea Nitrogen           52  H


Creatinine                  1.32  H


Est Glomerular          
            
              
              



Filtrat Rate
mL/min


Glucose Level                  94


Calcium Level                 9.5


Total Bilirubin               0.9


Direct Bilirubin             0.00


Indirect Bilirubin            0.9


Aspartate Amino             138  H
  
              
              



Transf
(AST/SGOT)


Alanine                       49  
  
              
              



Aminotransferase
(AL


T/SGPT)


Alkaline Phosphatase        2146  H


Troponin I                  0.085                         0.090


B-Type Natriuretic          5670  H


Peptide


Total Protein                 6.7


Albumin                       3.9


Globulin                     2.80


Albumin/Globulin             1.39


Ratio


Thyroid Stimulating        4.070  
  
              
              



Hormone
(TSH)


Free Thyroxine Index         1.72


Thyroxine (T4)               4.5  L


Triiodothyronine             38.2


(T3) Uptake


Ethyl Alcohol Level   < 10.0  H


Urine Opiates Screen                 NEGATIVE


Urine Barbiturates                   NEGATIVE


Urine Amphetamines                   NEGATIVE


Screen


Urine                                NEGATIVE


Benzodiazepines


Screen


Urine Cocaine Screen                 NEGATIVE


Urine Cannabinoids                   NEGATIVE


Creatine Kinase                                           1570  H


Creatine Kinase                                             2.9


Index


Creatinine Kinase MB                                     45.30  H


(Mass)


Test
                 1/16/19
07:47  
              
              



Sodium Level                  144


Potassium Level               4.1


Chloride Level                103


Carbon Dioxide Level           27


Anion Gap                     14  H


Blood Urea Nitrogen           49  H


Creatinine                  1.25  H


Est Glomerular          
            
              
              



Filtrat Rate
mL/min


Glucose Level                68  #L


Calcium Level                 9.0


Magnesium Level               2.1


Total Bilirubin               1.0


Direct Bilirubin             0.00


Indirect Bilirubin            1.0


Aspartate Amino             181  H
  
              
              



Transf
(AST/SGOT)


Alanine                       20  
  
              
              



Aminotransferase
(AL


T/SGPT)


Alkaline Phosphatase        2067  H


Creatine Kinase             1496  H


Creatine Kinase               2.7


Index


Creatinine Kinase MB       39.90  H


(Mass)


Troponin I                0.123  *H


Total Protein                 6.5


Albumin                       3.7


Globulin                     2.80


Albumin/Globulin             1.32


Ratio


Triglycerides Level          173  H


Cholesterol Level             160


LDL Cholesterol,               88


Calculated


HDL Cholesterol                37


Cholesterol/HDL               4.3


Ratio








Consultation Date/Type/Reason


Admit Date/Time


Dayo 15, 2019 at 18:59


Type of Consult


cv


Reason for Consultation


Atrial fibrillation and shortness of breath





Hx of Present Illness


This is a 73-year-old male past medical history of hypertension who presents wi


th multiple complaints.  Is been having progressive shortness of breath off and 


on over the past 3 months.  She has been having lower extremity edema off and on


for the past 6 months.  There is a possible episode of syncope yesterday.  


Patient is unsure if he just became dizzy or if he lost consciousness.  He 


denies any chest pain at rest or with exertion.  He does get shortness of breath


with lying down flat.  He is feeling better now.


12 point review of systems was performed with all pertinent positives and 


negatives mentioned above and all else is negative





Past Medical History


Medical History:  hypertension


Medications





Current Medications


Diltiazem HCl (Cardizem) 30 mg Q6 PO  Last administered on 1/16/19at 11:23; 


Admin Dose 30 MG;  Start 1/16/19 at 00:00


Metoprolol Tartrate (Lopressor) 5 mg Q6H  PRN IV TACHYARDIA Last administered on


1/16/19at 00:22; Admin Dose 5 MG;  Start 1/15/19 at 20:00


Enoxaparin Sodium (Lovenox) 40 mg DAILY SC  Last administered on 1/16/19at 


08:32; Admin Dose 40 MG;  Start 1/16/19 at 09:00


IV Flush (NS 3 ml) 3 ml PER PROTOCOL IV ;  Start 1/15/19 at 20:00


Ondansetron HCl (Zofran Inj) 4 mg Q6H  PRN IV NAUSEA AND/OR VOMITING;  Start 


1/15/19 at 20:00


Nitroglycerin (Nitroglycerin (Sl Tab) 0.4 Mg) 1 tab Q5M  PRN SL CHEST PAIN;  


Start 1/15/19 at 20:00


Acetaminophen (Tylenol Tab) 650 mg Q6H  PRN PO PAIN LEVEL 1-3 OR FEVER;  Start 


1/15/19 at 20:00


Morphine Sulfate (morphine) 2 mg Q4H  PRN IV PAIN LEVEL 7-10;  Start 1/15/19 at 


20:00


Docusate Sodium (Colace) 100 mg Q12H  PRN PO CONSTIPATION;  Start 1/15/19 at 


20:00


Magnesium Hydroxide (Milk Of Mag) 30 ml DAILY  PRN PO CONSTIPATION;  Start 


1/15/19 at 20:00


Bisacodyl (Dulcolax Supp) 10 mg DAILY  PRN RI CONSTIPATION;  Start 1/15/19 at 


20:00


Influenza Virus Vaccine Quadrival (Fluzone) 0.5 ml ONCE ONCE IM* ;  Start 


1/17/19 at 10:00;  Stop 1/17/19 at 10:01


Aspirin (Ecotrin) 325 mg ONCE  ONCE PO ;  Start 1/16/19 at 12:00;  Stop 1/16/19 


at 12:01


Aspirin (Halfprin) 81 mg DAILY PO ;  Start 1/17/19 at 09:00


Nicotine (Nicoderm 21 Mg/ 24hr) 1 patch DAILY TRANSDERM ;  Start 1/16/19 at 


13:00


Allergies:  


Coded Allergies:  


     No Known Drug Allergies (Verified  Allergy, Unknown, 1/15/19)





Past Surgical History


Past Surgical Hx:  no surgical history





Family History


Significant Family History:  no pertinent family hx





Social History


Alcohol Use:  none


Smoking Status:  Current some day smoker (1 pack a day for the past 4 years)


Drug Use:  none





Exam/Review of Systems


Vital Signs


Vitals





Vital Signs


  Date      Temp  Pulse  Resp  B/P (MAP)   Pulse Ox  O2          O2 Flow    FiO2


Time                                                 Delivery    Rate


   1/16/19  97.9     71    17      108/61        96


     11:25                           (77)


   1/16/19                                           Nasal             2.0


     08:00                                           Cannula








Intake and Output





1/15/19


1/15/19


1/16/19





1515:00


23:00


07:00





IntakeIntake Total


300 ml





OutputOutput Total


200 ml





BalanceBalance


100 ml














Exam


No apparent distress


Constitutional:  alert, oriented


Head:  normocephalic


Respiratory:  other (Coarse breath sounds bilaterally, no wheezing)


Cardiovascular:  irregular rhythm, other (S1-S2 heard)


Gastrointestinal:  soft, non-tender, bowel sounds


Extremities:  edema





Medications


Medications





Current Medications


Diltiazem HCl (Cardizem) 30 mg Q6 PO  Last administered on 1/16/19at 11:23; 


Admin Dose 30 MG;  Start 1/16/19 at 00:00


Metoprolol Tartrate (Lopressor) 5 mg Q6H  PRN IV TACHYARDIA Last administered on


1/16/19at 00:22; Admin Dose 5 MG;  Start 1/15/19 at 20:00


Enoxaparin Sodium (Lovenox) 40 mg DAILY SC  Last administered on 1/16/19at 


08:32; Admin Dose 40 MG;  Start 1/16/19 at 09:00


IV Flush (NS 3 ml) 3 ml PER PROTOCOL IV ;  Start 1/15/19 at 20:00


Ondansetron HCl (Zofran Inj) 4 mg Q6H  PRN IV NAUSEA AND/OR VOMITING;  Start 


1/15/19 at 20:00


Nitroglycerin (Nitroglycerin (Sl Tab) 0.4 Mg) 1 tab Q5M  PRN SL CHEST PAIN;  


Start 1/15/19 at 20:00


Acetaminophen (Tylenol Tab) 650 mg Q6H  PRN PO PAIN LEVEL 1-3 OR FEVER;  Start 


1/15/19 at 20:00


Morphine Sulfate (morphine) 2 mg Q4H  PRN IV PAIN LEVEL 7-10;  Start 1/15/19 at 


20:00


Docusate Sodium (Colace) 100 mg Q12H  PRN PO CONSTIPATION;  Start 1/15/19 at 


20:00


Magnesium Hydroxide (Milk Of Mag) 30 ml DAILY  PRN PO CONSTIPATION;  Start 


1/15/19 at 20:00


Bisacodyl (Dulcolax Supp) 10 mg DAILY  PRN RI CONSTIPATION;  Start 1/15/19 at 


20:00


Influenza Virus Vaccine Quadrival (Fluzone) 0.5 ml ONCE ONCE IM* ;  Start 


1/17/19 at 10:00;  Stop 1/17/19 at 10:01


Aspirin (Ecotrin) 325 mg ONCE  ONCE PO ;  Start 1/16/19 at 12:00;  Stop 1/16/19 


at 12:01


Aspirin (Halfprin) 81 mg DAILY PO ;  Start 1/17/19 at 09:00


Nicotine (Nicoderm 21 Mg/ 24hr) 1 patch DAILY TRANSDERM ;  Start 1/16/19 at 


13:00





Imaging


Imaging


ECG with atrial fibrillation at 77 bpm, septal Q waves, nonspecific ST abnorm


alities











Austin Reese DO           Jan 16, 2019 12:17

## 2019-01-16 NOTE — NUR
Pt admitted from ER,  a/a/ox4, VS stable, afebrile.Afib in monitor. skin intact with scab on 
B legs. Medication  Cardizem scheduled given late , pt arrived in the floor after midnight. 
Make pt comfortable and all needs attended. Continue care plan.

## 2019-01-16 NOTE — HP
Date/Time of Note


Date/Time of Note


DATE: 1/16/19 


TIME: 11:16





Assessment/Plan


VTE Prophylaxis


Risk score (from Ns)>0 risk:  4


SCD applied (from Ns):  Yes


Pharmacological prophylaxis:  LMWH





Lines/Catheters


IV Catheter Type (from [facility]):  Saline Lock


Urinary Cath still in place:  No





Assessment/Plan


Assessment/Plan


73-year-old male with:





1.  Syncopal episode, likely related to arrhythmia and atrial fibrillation.  CT 


head negative.  No neurological deficit.


Cardiac workup ongoing, heart rate control, cardiology consulted.


Hemodynamically stable.  Will check orthostatics.





2. Atrial fibrillation, new onset, episodes of rapid ventricular rate.  TFTs 


within normal limits.  Heart rate slightly better controlled, on Cardizem p.o. 


along with Lopressor IV as needed.


2D echocardiogram pending, cardiology consult pending.


Hemodynamically stable.





3.  Positive troponin today, in setting of episodes of tachyarrhythmia, no 


previous diagnosis of coronary artery disease as far as we know.


Aspirin 325 mg p.o. x1 now then 81 mg p.o. daily.


Trending troponins, check fasting lipid panel


2D echocardiogram pending


Further recommendations per cardiology.





4.  Hypertension: Currently on Cardizem given atrial fibrillation, will titrate 


medications as needed for also blood pressure control.





5.  Renal insufficiency, acute kidney injury versus CKD, unclear.  Patient on 


gentle IV fluid hydration especially with a CT angiogram chest done yesterday.  


Monitor renal function and electrolytes.





6.  Ascending aortic aneurysm, incidental finding, 4.7 cm, needs surveillance 


and monitoring, next CT angiogram due in 6 months to assess if stable size 


versus increasing size.  For now no further intervention, discussed with 


cardiology.





7.  Tobacco use, patient agreeable to quit, will order for nicotine patch.





8.  T11 compression fracture, old, incidental finding.  Patient stable.  No 


signs of adenopathy seen or pulmonary masses seen.





Prophylaxis: Lovenox for DVT prophylaxis, patient tolerating p.o.


Disposition: Heart rate control, 2D echocardiogram results, evaluating for acute


coronary syndrome given troponin trending up.


Result Diagram:  


1/16/19 0744                                                                    


           1/16/19 0747





Results 24hrs





Laboratory Tests


Test
                 1/15/19
16:39  1/15/19
19:20  1/15/19
23:00  1/16/19
07:44


White Blood Count             6.1                                          6.7


Red Blood Count              5.53                                         5.69


Hemoglobin                   17.1                                         17.8


Hematocrit                   51.5                                        52.5  H


Mean Corpuscular             93.1                                         92.3


Volume


Mean Corpuscular             30.9                                         31.3


Hemoglobin


Mean Corpuscular            33.2  
  
              
                    33.9  



Hemoglobin
Concent


Red Cell                    15.7  H                                      16.5  H


Distribution Width


Platelet Count               102  L                                        95  L


Mean Platelet Volume        12.4  H                                      12.6  H


Immature                   0.800  H                                     1.600  H


Granulocytes %


Neutrophils %                73.3                                         72.3


Lymphocytes %                17.7                                         17.1


Monocytes %                   7.9                                          8.5


Eosinophils %                 0.0                                          0.1


Basophils %                   0.3                                          0.4


Nucleated Red Blood          0.3  H                                       0.9  H


Cells %


Immature                   0.050  H                                     0.110  H


Granulocytes #


Neutrophils #                 4.5                                          4.9


Lymphocytes #                 1.1                                          1.2


Monocytes #                   0.5                                          0.6


Eosinophils #                 0.0                                          0.0


Basophils #                   0.0                                          0.0


Nucleated Red Blood           0.0                                         0.1  H


Cells #


Prothrombin Time            15.2  H


Prothrombin Time              1.2


Ratio


INR International           1.19  
  
              
              



Normalized
Ratio


Activated                   27.4  
  
              
              



Partial
Thromboplast


Time


D-Dimer                  8518.92  H


D-Dimer Comment


Sodium Level                  143


Potassium Level               4.6


Chloride Level                104


Carbon Dioxide Level           26


Anion Gap                      13


Blood Urea Nitrogen           52  H


Creatinine                  1.32  H


Est Glomerular          
            
              
              



Filtrat Rate
mL/min


Glucose Level                  94


Calcium Level                 9.5


Total Bilirubin               0.9


Direct Bilirubin             0.00


Indirect Bilirubin            0.9


Aspartate Amino             138  H
  
              
              



Transf
(AST/SGOT)


Alanine                       49  
  
              
              



Aminotransferase
(AL


T/SGPT)


Alkaline Phosphatase        2146  H


Troponin I                  0.085                         0.090


B-Type Natriuretic          5670  H


Peptide


Total Protein                 6.7


Albumin                       3.9


Globulin                     2.80


Albumin/Globulin             1.39


Ratio


Thyroid Stimulating        4.070  
  
              
              



Hormone
(TSH)


Free Thyroxine Index         1.72


Thyroxine (T4)               4.5  L


Triiodothyronine             38.2


(T3) Uptake


Ethyl Alcohol Level   < 10.0  H


Urine Opiates Screen                 NEGATIVE


Urine Barbiturates                   NEGATIVE


Urine Amphetamines                   NEGATIVE


Screen


Urine                                NEGATIVE


Benzodiazepines


Screen


Urine Cocaine Screen                 NEGATIVE


Urine Cannabinoids                   NEGATIVE


Creatine Kinase                                           1570  H


Creatine Kinase                                             2.9


Index


Creatinine Kinase MB                                     45.30  H


(Mass)


Test
                 1/16/19
07:47  
              
              



Sodium Level                  144


Potassium Level               4.1


Chloride Level                103


Carbon Dioxide Level           27


Anion Gap                     14  H


Blood Urea Nitrogen           49  H


Creatinine                  1.25  H


Est Glomerular          
            
              
              



Filtrat Rate
mL/min


Glucose Level                68  #L


Calcium Level                 9.0


Magnesium Level               2.1


Total Bilirubin               1.0


Direct Bilirubin             0.00


Indirect Bilirubin            1.0


Aspartate Amino             181  H
  
              
              



Transf
(AST/SGOT)


Alanine                       20  
  
              
              



Aminotransferase
(AL


T/SGPT)


Alkaline Phosphatase        2067  H


Creatine Kinase             1496  H


Creatine Kinase               2.7


Index


Creatinine Kinase MB       39.90  H


(Mass)


Troponin I                0.123  *H


Total Protein                 6.5


Albumin                       3.7


Globulin                     2.80


Albumin/Globulin             1.32


Ratio


Triglycerides Level          173  H


Cholesterol Level             160


LDL Cholesterol,               88


Calculated


HDL Cholesterol                37


Cholesterol/HDL               4.3


Ratio








HPI/ROS


Admit Date/Time


Admit Date/Time


Dayo 15, 2019 at 18:59





Hx of Present Illness


Chief complaint: Syncopal episode, shortness of breath.





History of presenting illness: This is a 73-year-old male with history of 


hypertension, very active, he still works as a guard and reports new onset 


dyspnea on exertion and decreased exercise tolerance over the past 3 months, 3 


years of lower extremity edema on and off worsening over the past 3 months, and 


yesterday he was brought to the ER after he had a syncopal episode at work.  The


patient does have hypertension and is compliant with current medications which 


is amlodipine/HCTZ combination medication.  He denies any chest pains, he denies


feeling any palpitations.  He does not remember the syncopal episode yesterday 


but does remember being at work when it happened.  He denies any nausea, 


vomiting, diaphoresis.  He does report again dyspnea on exertion and decreased 


exercise tolerance to a point where he cannot go more than 2 blocks without 


stopping for rest, this has been going on for the past 3 months.  He is 


compliant with a follow-up with his primary care physician.  He denies any 


previous history of cardiac disease or cardiac arrhythmia.  He denies any 


previous history of renal disease or pulmonary disease.  He had an incidental 


finding of 4.7 cm ascending aortic aneurysm, he is not aware of this.  In the 


emergency department he was evaluated found to be in atrial ablation/atrial 


flutter with varying heart rate up to 150, CT angiogram was done which is 


negative for pulmonary embolus but did show incidentally a ascending aortic 


aneurysm.  Patient was admitted to telemetry for new onset atrial fibrillation 


and syncopal episode.  Cardiology is consulted.  This morning, his third 


troponin is positive, EKG is unchanged with atrial fibrillation and the patient 


denies any chest pain.  Cardiac enzymes are being trended.


Patient denies any previous episodes of syncope.


Patient is a current heavy smoker.





ROS


Constitutional:  fatigue


Eyes:  no complaints


ENT:  no complaints


Respiratory:  shortness of breath (On exertion)


Cardiovascular:  other (Syncopal episode)


Gastrointestinal:  no complaints


Genitourinary:  no complaints


Musculoskeletal:  no complaints, swelling (Lower extremities on and off)


Skin:  no complaints


Neurologic:  no complaints


Lymphatic:  no complaints


Immunologic:  no complaints





PMH/Family/Social


Past Medical History


Medical History:  hypertension


Medications





Current Medications


Diltiazem HCl (Cardizem) 30 mg Q6 PO  Last administered on 1/16/19at 06:17; 


Admin Dose 30 MG;  Start 1/16/19 at 00:00


Metoprolol Tartrate (Lopressor) 5 mg Q6H  PRN IV TACHYARDIA Last administered on


1/16/19at 00:22; Admin Dose 5 MG;  Start 1/15/19 at 20:00


Enoxaparin Sodium (Lovenox) 40 mg DAILY SC  Last administered on 1/16/19at 


08:32; Admin Dose 40 MG;  Start 1/16/19 at 09:00


IV Flush (NS 3 ml) 3 ml PER PROTOCOL IV ;  Start 1/15/19 at 20:00


Ondansetron HCl (Zofran Inj) 4 mg Q6H  PRN IV NAUSEA AND/OR VOMITING;  Start 


1/15/19 at 20:00


Nitroglycerin (Nitroglycerin (Sl Tab) 0.4 Mg) 1 tab Q5M  PRN SL CHEST PAIN;  


Start 1/15/19 at 20:00


Acetaminophen (Tylenol Tab) 650 mg Q6H  PRN PO PAIN LEVEL 1-3 OR FEVER;  Start 


1/15/19 at 20:00


Morphine Sulfate (morphine) 2 mg Q4H  PRN IV PAIN LEVEL 7-10;  Start 1/15/19 at 


20:00


Docusate Sodium (Colace) 100 mg Q12H  PRN PO CONSTIPATION;  Start 1/15/19 at 


20:00


Magnesium Hydroxide (Milk Of Mag) 30 ml DAILY  PRN PO CONSTIPATION;  Start 


1/15/19 at 20:00


Bisacodyl (Dulcolax Supp) 10 mg DAILY  PRN [place] CONSTIPATION;  Start 1/15/19 at 


20:00


Influenza Virus Vaccine Quadrival (Fluzone) 0.5 ml ONCE ONCE IM* ;  Start 1/1 7/19 at 10:00;  Stop 1/17/19 at 10:01


Coded Allergies:  


     No Known Drug Allergies (Verified  Allergy, Unknown, 1/15/19)





Past Surgical History


Past Surgical Hx:  no surgical history





Family History


Significant Family History:  no pertinent family hx





Social History


Alcohol Use:  none


Smoking Status:  Current some day smoker (1 pack a day for the past 4 years)


Drug Use:  none





Exam/Review of Systems


Vital Signs


Vitals





Vital Signs


  Date      Temp  Pulse  Resp  B/P (MAP)   Pulse Ox  O2          O2 Flow    FiO2


Time                                                 Delivery    Rate


   1/16/19           97


     08:49


   1/16/19                                           Nasal             2.0


     08:00                                           Cannula


   1/16/19  97.9           18      135/68        96


     07:51                           (90)








Intake and Output





1/15/19


1/15/19


1/16/19





1515:00


23:00


07:00





IntakeIntake Total


300 ml





OutputOutput Total


200 ml





BalanceBalance


100 ml














Exam


Constitutional:  alert, oriented, well developed


Psych:  no complaints, nl mood/affect


Respiratory:  clear to auscultation, normal air movement


Cardiovascular:  nl pulses, irregular rhythm (Atrial fibrillation with varying 


rate, occasional RVR up to 150 bpm)


Gastrointestinal:  soft, non-tender


Musculoskeletal:  nl extremities to inspection, nl gait and stance


Extremities:  normal pulses


Neurological:  CNS II-XII intact, nl mental status, nl speech, nl strength


Additional Comments


EKG on admission, atrial fibrillation with RVR up to 150 bpm.  EKG this morning,


controlled A. fib 77 bpm.  Telemetry patient remains mostly controlled however 


occasional episodes of RVR up to 150 bpm





PROCEDURE:  CT angiogram chest


CLINICAL INDICATION:  Shortness of breath


TECHNIQUE:   Transaxial slices were obtained throughout the chest with IV 


contrast and CT chest angiogram was obtained. Additional sagittal and coronal 


MPR  images were obtained.. 3-D images were  obtained. One of more of the 


following dose reduction techniques were utilized: -automatic exposure control.-


adjustment of the mA and/or kV according to patient size. -Use of iterative 


reconstruction technique.DICOM images available


Contrast: 80 cc Visipaque 320


Radiation Dose: CTDI is 10.77 mGy. DLP is 413.51 mGy-cm.


COMPARISON:   Chest one-view 


FINDINGS:


Normal opacification of the mediastinal structures is seen and no intraluminal 


filling defects is noted in the pulmonary arterial tree up to 3rd order branches


to suggest pulmonary embolism. Degenerate changes noted in the lower cervical 


spine. Cardiomegaly, enlarged distal ascending aorta measuring 4.7 cm consistent


with aneurysm. Coronary artery calcifications are seen. Small retrocardiac 


hiatal hernia noted. Degenerative changes noted in the lower dorsal spine. T11 


compression fracture is noted. No significant mediastinal adenopathy seen.


Lung windows demonstrate symmetric lung volumes. Dilated bronchi to the lower 


lobes are seen. No subpleural blebs, pneumothorax or pleural effusion noted. No 


endobronchial lesions seen. No displaced rib fracture noted.


CT abdomen: Liver demonstrates low density lesion near the dome suggestive of 


cyst. Engorged hepatic veins are seen. Nondistended stomach, normal spleen, 


calcified suprarenal abdominal aorta is noted. Abnormal increased density to the


thoracolumbar spine


IMPRESSION:


 No evidence of pulmonary embolism noted.


Ascending aortic aneurysm. Enlarged main pulmonary artery suggestive of arterial


hypertension.


Cardiomegaly is seen without pericardial or pleural effusion. Cannot rule out 


underlying heart failure.


Abnormal patchy density to the thoracolumbar spine suspicious for metabolic or 


metastatic etiology.


Underlying degenerative changes with T11 compression fracture also seen.


RPTAT:AAOO


_____________________________________________ 


Physician Dana           Date    Time 


Electronically viewed and signed by Physician Dana on 01/15/2019 


18:38 


 


D:  01/15/2019 18:38  T:  01/15/2019 18:38


MB/











PROCEDURE:   XR Chest. 


 


CLINICAL INDICATION:  Shortness of breath


 


TECHNIQUE:   Portable single view of the chest


 


COMPARISON:   None. 


 


FINDINGS:


Enlarged cardiopericardial silhouette. Atherosclerotic change of the aorta. 


Slight prominence of the paratracheal soft tissues which may be due to ectatic 


vasculature. No definite acute infiltrate, pleural effusion, or overt congestive


heart failure. No slight rightward scoliosis which may in part be positional.. 


 


IMPRESSION:


Enlarged cardiopericardial silhouette and atherosclerotic change of the aorta.. 


 


RPTAT: HLBE


_____________________________________________ 


Darlyn Rosas, Physician           Date    Time 


Electronically viewed and signed by Darlyn Rosas Physician on 01/15/2019 


17:22 


 


D:  01/15/2019 17:22  T:  01/15/2019 17:22


LE/














PROCEDURE:   CT brain without contrast


 


CLINICAL INDICATION:   Headaches following a fall


 


TECHNIQUE:   A CT of the brain was performed utilizing axial sections from the 


skull base through the vertex without contrast. Sagittal and coronal images were


also reformatted. DICOM images are available. One or more of the following dose 


reduction techniques were used: Automated exposure control, adjustment of the mA


and/or kV according to patient size, use of iterative reconstruction technique.


The exam CTDIvol = 38.46 mGy and  DLP = 634.23 mGy-cm. 


 


COMPARISON:   None available 


 


FINDINGS:


 


No acute intracranial hemorrhage is identified.  There is no mass effect or 


midline shift.  No extra-axial fluid collection is seen.  The ventricles and 


sulci are larger in size and configuration for the patient's provided age of 73 


years consistent with advanced generalized atrophy.  Low attenuation of the 


subcortical and periventricular white matter is nonspecific but most likely 


chronic small vessel ischemic disease.  Gray-white differentiation is preserved 


with no findings to suggest an acute ischemic infarct.


The fourth ventricle is midline and there is no density alteration within the 


sakina or cerebellum.  


 


The osseous structures are unremarkable.  The mastoid air cells and visualized 


paranasal sinuses are clear. Atherosclerotic calcification of the cavernous 


internal carotid arteries is present. 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 


zygoma and right lateral orbit are likely shrapnel from a remote injury


 


RPTAT:HJJR


 


IMPRESSION:


 


1. Advanced atrophy for the patient's provided age without evidence for acute 


intracranial abnormality or mass effect.  


2. Metallic shrapnel foreign bodies lateral to the right orbit adjacent to the 


zygoma.


3. Cavernous internal carotid artery atherosclerotic calcification.


_____________________________________________ 


Physician Sumi           Date    Time 


Electronically viewed and signed by Physician Sumi on 01/15/2019 18:18 


 


D:  01/15/2019 18:18  T:  01/15/2019 18:18


/











EMERY RUSSELL                  Jan 16, 2019 11:16

## 2019-01-16 NOTE — NUR
PT HAD 19 BEATS OF VTACH AT 21:20. HE WAS ASYMPTOMATIC, VITALS: /82; HR 99; 94% O2 ON 
2 L NASAL CANNULA.

INFORMED COVERING MD DR. LEMON FOR DR. LEES. NO NEW ORDER.

## 2019-01-16 NOTE — NUR
EOSS:

NO ACUTE DISTRESS DURING DAY SHIFT. PT IS AOX4, AMBULATES WITH ASSISTANCE, DENIES PAIN, 
RESPIRATIONS UNLABORED, ON 2L NC WITH O2 SATURATION ABOVE 94%. ALL MEDICATIONS GIVEN AS 
PRESCRIBED. ALL PT'S NEEDS HAVE BEEN MET. WILL ENDORSE TO NIGHT SHIFT NURSE.

## 2019-01-16 NOTE — RADRPT
Echocardiogram Report

 

Patient Name:  ZEYAD STYLES  Gender:       Male

MRN:           [id number]       Accession #:  [id number]

Birth Date:    1945   Study Date:   16-Jan-2019

Sonographer:   TB            Location:     [id number]

 

Ref. Physician: DANIEL RUSSELL

Quality: Good

 

Procedures: Transthoracic echocardiogram with complete 2D, M-Mode, and 

doppler examination.

Indications: Atrial Fibrillation new onset.

 

2D/M Mode                          Doppler

Measurement  Value  Normal Ranges  Measurement    Value   Normal Ranges

LVIDd 2D     4.1    3.5 - 5.6 cm   AV Mean Eric    0.8     m/sec

LVIDs 2D     3.4    2.1 - 4.1 cm   AV Mean PG     3.0     mmHg

LVPWd 2D     1.6    0.6 - 1.1 cm   AV VTI         17.3    cm

IVSd 2D      2.1    0.6 - 1.1 cm   AI Peak PG     46.0    mmHg

AoR Diam 2D  3.3    2.0 - 3.7 cm   AI Peak Eirc    3.4     m/sec

LA/Ao 2D     1      0 - 1          AI PHT         6476.0  msec

EF 2D        36.0   50.0 - 65.0 %  LVOT Mean Eric  0.6     m/sec

LA Dimen 2D  3.7    2.3 - 4.0 cm   LVOT Mean PG   2.0     mmHg

IVC Diam     2.0    1.2 - 2.0      LVOT Peak Eric  1.0     m/sec

LVOT Peak PG   4.0     mmHg

TAPSE          1.6     cm

TR Peak Eric    3.5     m/sec

TR Peak PG     50.0    mmHg

RVSP           53.0    mmHg

RA Pressure    3.0

 

Findings

Left Ventricle: Lower limits of normal systolic function.  Normal left 

ventricular cavity size.  Severe left ventricular hypertrophy.  Ejection 

fraction is visually estimated at 50 %.  Abnormal Diastolic Function.

Right Ventricle: Normal right ventricular systolic function.  Moderate 

enlargement of right ventricle.

Left Atrium: There is severe enlargement of left atrium.

Right Atrium: There is severe enlargement of right atrium.

Mitral Valve: Mild mitral leaflet calcification.  Moderate mitral 

annular calcification.  Mild mitral valve regurgitation.  The 

regurgitation jet is eccentrically directed which may underestimate the 

severity of mitral regurgitation.

Aortic Valve: Aortic sclerosis without significant stenosis.  Aortic 

cusps appear mildly calcified.  Mild aortic valve regurgitation.

Tricuspid Valve: Normal appearance of the tricuspid valve.  Estimated 

peak PA systolic pressure 53 mmHg.  There is moderate tricuspid 

regurgitation.

Pulmonic Valve: There is trace pulmonic regurgitation.

Pericardium: Normal pericardium with no significant pericardial effusion.

Aorta: Normal aortic root.

IVC: Normal size and normal respiratory collapse consistent with normal 

right atrial pressure.

 

Conclusions

Lower limits of normal systolic function.  Normal left ventricular 

cavity size.  Severe left ventricular hypertrophy.  Ejection fraction is 

visually estimated at 50 %.  Abnormal Diastolic Function.

Normal right ventricular systolic function.  Moderate enlargement of 

right ventricle.

There is severe enlargement of left atrium.

There is severe enlargement of right atrium.

Mild mitral valve regurgitation.  The regurgitation jet is eccentrically 

directed which may underestimate the severity of mitral regurgitation.

Aortic sclerosis without significant stenosis. Mild aortic valve 

regurgitation.

Estimated peak PA systolic pressure 53 mmHg.  There is moderate 

tricuspid regurgitation.

Normal pericardium with no significant pericardial effusion.

 

Electronically Signed By:

Austin Reese

16-Jan-2019 18:52:43  -0800

 

Patient Name: ZEYAD STYLES

MRN: [id number]

Study Date: 16-Jan-2019

 

[id number]

## 2019-01-17 VITALS — DIASTOLIC BLOOD PRESSURE: 81 MMHG | HEART RATE: 66 BPM | SYSTOLIC BLOOD PRESSURE: 110 MMHG | RESPIRATION RATE: 18 BRPM

## 2019-01-17 VITALS — SYSTOLIC BLOOD PRESSURE: 113 MMHG | DIASTOLIC BLOOD PRESSURE: 65 MMHG | HEART RATE: 59 BPM | RESPIRATION RATE: 17 BRPM

## 2019-01-17 VITALS — SYSTOLIC BLOOD PRESSURE: 114 MMHG | HEART RATE: 59 BPM | DIASTOLIC BLOOD PRESSURE: 77 MMHG | RESPIRATION RATE: 16 BRPM

## 2019-01-17 VITALS — DIASTOLIC BLOOD PRESSURE: 69 MMHG | HEART RATE: 64 BPM | SYSTOLIC BLOOD PRESSURE: 105 MMHG | RESPIRATION RATE: 19 BRPM

## 2019-01-17 VITALS — DIASTOLIC BLOOD PRESSURE: 71 MMHG | SYSTOLIC BLOOD PRESSURE: 86 MMHG | RESPIRATION RATE: 19 BRPM | HEART RATE: 60 BPM

## 2019-01-17 VITALS — HEART RATE: 101 BPM

## 2019-01-17 VITALS — HEART RATE: 104 BPM

## 2019-01-17 VITALS — HEART RATE: 88 BPM

## 2019-01-17 VITALS — HEART RATE: 120 BPM

## 2019-01-17 VITALS — HEART RATE: 90 BPM

## 2019-01-17 VITALS — SYSTOLIC BLOOD PRESSURE: 107 MMHG | DIASTOLIC BLOOD PRESSURE: 68 MMHG | HEART RATE: 88 BPM | RESPIRATION RATE: 16 BRPM

## 2019-01-17 VITALS — HEART RATE: 105 BPM

## 2019-01-17 LAB
ADD MAN DIFF?: NO
ANION GAP: 5 (ref 5–13)
ANION GAP: 6 (ref 5–13)
BASOPHIL #: 0 10^3/UL (ref 0–0.1)
BASOPHILS %: 0.4 % (ref 0–2)
BLOOD UREA NITROGEN: 50 MG/DL (ref 7–20)
BLOOD UREA NITROGEN: 51 MG/DL (ref 7–20)
CALCIUM: 8.5 MG/DL (ref 8.4–10.2)
CALCIUM: 8.7 MG/DL (ref 8.4–10.2)
CARBON DIOXIDE: 30 MMOL/L (ref 21–31)
CARBON DIOXIDE: 31 MMOL/L (ref 21–31)
CHLORIDE: 100 MMOL/L (ref 97–110)
CHLORIDE: 101 MMOL/L (ref 97–110)
CREATININE: 1.33 MG/DL (ref 0.61–1.24)
CREATININE: 1.4 MG/DL (ref 0.61–1.24)
EOSINOPHILS #: 0 10^3/UL (ref 0–0.5)
EOSINOPHILS %: 0.4 % (ref 0–7)
GLUCOSE: 59 MG/DL (ref 70–220)
GLUCOSE: 79 MG/DL (ref 70–220)
HEMATOCRIT: 49.4 % (ref 42–52)
HEMOGLOBIN: 16.5 G/DL (ref 14–18)
IMMATURE GRANS #M: 0.07 10^3/UL (ref 0–0.03)
IMMATURE GRANS % (M): 1.3 % (ref 0–0.43)
LYMPHOCYTES #: 1 10^3/UL (ref 0.8–2.9)
LYMPHOCYTES %: 18.2 % (ref 15–51)
MAGNESIUM: 2.1 MG/DL (ref 1.7–2.5)
MAGNESIUM: 2.1 MG/DL (ref 1.7–2.5)
MEAN CORPUSCULAR HEMOGLOBIN: 31.1 PG (ref 29–33)
MEAN CORPUSCULAR HGB CONC: 33.4 G/DL (ref 32–37)
MEAN CORPUSCULAR VOLUME: 93.2 FL (ref 82–101)
MEAN PLATELET VOLUME: 12.2 FL (ref 7.4–10.4)
MONOCYTE #: 0.6 10^3/UL (ref 0.3–0.9)
MONOCYTES %: 11.8 % (ref 0–11)
NEUTROPHIL #: 3.6 10^3/UL (ref 1.6–7.5)
NEUTROPHILS %: 67.9 % (ref 39–77)
NUCLEATED RED BLOOD CELLS #: 0 10^3/UL (ref 0–0)
NUCLEATED RED BLOOD CELLS%: 0.6 /100WBC (ref 0–0)
PHOSPHORUS: 4 MG/DL (ref 2.5–4.9)
PLATELET COUNT: 105 10^3/UL (ref 140–415)
POTASSIUM: 3.8 MMOL/L (ref 3.5–5.1)
POTASSIUM: 3.9 MMOL/L (ref 3.5–5.1)
RED BLOOD COUNT: 5.3 10^6/UL (ref 4.7–6.1)
RED CELL DISTRIBUTION WIDTH: 15.4 % (ref 11.5–14.5)
SODIUM: 136 MMOL/L (ref 135–144)
SODIUM: 137 MMOL/L (ref 135–144)
WHITE BLOOD COUNT: 5.3 10^3/UL (ref 4.8–10.8)

## 2019-01-17 RX ADMIN — METOPROLOL TARTRATE 1 MG: 25 TABLET ORAL at 14:11

## 2019-01-17 RX ADMIN — METOPROLOL TARTRATE SCH MG: 25 TABLET, FILM COATED ORAL at 14:11

## 2019-01-17 RX ADMIN — ASPIRIN 1 MG: 81 TABLET, COATED ORAL at 08:02

## 2019-01-17 RX ADMIN — METOPROLOL TARTRATE 1 MG: 25 TABLET ORAL at 05:53

## 2019-01-17 RX ADMIN — FUROSEMIDE 1 MG: 10 INJECTION, SOLUTION INTRAVENOUS at 05:53

## 2019-01-17 RX ADMIN — FLUTICASONE FUROATE AND VILANTEROL TRIFENATATE SCH INH: 100; 25 POWDER RESPIRATORY (INHALATION) at 11:37

## 2019-01-17 RX ADMIN — MAGNESIUM SULFATE HEPTAHYDRATE 1 MLS/HR: 1 INJECTION, SOLUTION INTRAVENOUS at 16:33

## 2019-01-17 RX ADMIN — INFLUENZA A VIRUS A/MICHIGAN/45/2015 X-275 (H1N1) ANTIGEN (FORMALDEHYDE INACTIVATED), INFLUENZA A VIRUS A/HONG KONG/4801/2014 X-263B (H3N2) ANTIGEN (FORMALDEHYDE INACTIVATED), INFLUENZA B VIRUS B/PHUKET/3073/2013 ANTIGEN (FORMALDEHYDE INACTIVATED), AND INFLUENZA B VIRUS B/BRISBANE/60/2008 ANTIGEN (FORMALDEHYDE INACTIVATED) 1 ML: 15; 15; 15; 15 INJECTION, SUSPENSION INTRAMUSCULAR at 11:00

## 2019-01-17 RX ADMIN — NICOTINE SCH PATCH: 21 PATCH, EXTENDED RELEASE TRANSDERMAL at 08:02

## 2019-01-17 RX ADMIN — POTASSIUM CHLORIDE 1 MEQ: 1500 TABLET, EXTENDED RELEASE ORAL at 15:08

## 2019-01-17 RX ADMIN — METOPROLOL TARTRATE SCH MG: 25 TABLET, FILM COATED ORAL at 05:53

## 2019-01-17 RX ADMIN — APIXABAN SCH MG: 5 TABLET, FILM COATED ORAL at 20:30

## 2019-01-17 RX ADMIN — APIXABAN 1 MG: 5 TABLET, FILM COATED ORAL at 08:02

## 2019-01-17 RX ADMIN — APIXABAN 1 MG: 5 TABLET, FILM COATED ORAL at 20:30

## 2019-01-17 RX ADMIN — FUROSEMIDE 1 MG: 10 INJECTION, SOLUTION INTRAVENOUS at 16:33

## 2019-01-17 RX ADMIN — FLUTICASONE FUROATE AND VILANTEROL TRIFENATATE 1 INH: 100; 25 POWDER RESPIRATORY (INHALATION) at 11:37

## 2019-01-17 RX ADMIN — METOPROLOL TARTRATE 1 MG: 25 TABLET ORAL at 20:31

## 2019-01-17 RX ADMIN — NICOTINE 1 PATCH: 21 PATCH, EXTENDED RELEASE TRANSDERMAL at 08:02

## 2019-01-17 RX ADMIN — ASPIRIN SCH MG: 81 TABLET, COATED ORAL at 08:02

## 2019-01-17 RX ADMIN — APIXABAN SCH MG: 5 TABLET, FILM COATED ORAL at 08:02

## 2019-01-17 RX ADMIN — TIOTROPIUM BROMIDE 1 INH: 18 CAPSULE ORAL; RESPIRATORY (INHALATION) at 11:38

## 2019-01-17 NOTE — CONS
Date/Time of Note


Date/Time of Note


DATE: 1/17/19 


TIME: 15:17





Assessment/Plan


Newly diagnosed atrial fibrillation


Acute decompensated systolic congestive heart failure


Renal dysfunction


Hypertension


Cardiomyopathy with ejection fraction 50%


Mitral and tricuspid valve regurgitation


NSVT





-Increase dose of beta-blocker


-Troponins are mildly elevated and trending down.  Possibly secondary to 


decompensated congestive heart failure, atrial fibrillation with rapid 


ventricular rates.  Would plan for nuclear cardiac perfusion study tomorrow


-Continue aspirin therapy, no statin at the current time given abnormal LFTs.


-Maintain potassium above 4.0 and magnesium above 2.0


Result Diagram:  


1/17/19 0552                                                                    


           1/17/19 1259





Results 24hrs





Laboratory Tests


Test
                                1/16/19
19:47  1/17/19
05:52  1/17/19
12:59


Creatine Kinase                            1507  H


Creatine Kinase Index                        2.2


Creatinine Kinase MB (Mass)               33.30  H


Troponin I                                 0.114


White Blood Count                                          5.3  #


Red Blood Count                                            5.30


Hemoglobin                                                 16.5


Hematocrit                                                 49.4


Mean Corpuscular Volume                                    93.2


Mean Corpuscular Hemoglobin                                31.1


Mean Corpuscular Hemoglobin
Concent  
                    33.4  
  



Red Cell Distribution Width                               15.4  H


Platelet Count                                             105  L


Mean Platelet Volume                                      12.2  H


Immature Granulocytes %                                  1.300  H


Neutrophils %                                              67.9


Lymphocytes %                                              18.2


Monocytes %                                               11.8  H


Eosinophils %                                               0.4


Basophils %                                                 0.4


Nucleated Red Blood Cells %                                0.6  H


Immature Granulocytes #                                  0.070  H


Neutrophils #                                               3.6


Lymphocytes #                                               1.0


Monocytes #                                                 0.6


Eosinophils #                                               0.0


Basophils #                                                 0.0


Nucleated Red Blood Cells #                                 0.0


Sodium Level                                                137            136


Potassium Level                                             3.9            3.8


Chloride Level                                              101            100


Carbon Dioxide Level                                         30             31


Anion Gap                                                    6  #            5


Blood Urea Nitrogen                                         50  H          51  H


Creatinine                                                1.40  H        1.33  H


Est Glomerular Filtrat Rate
mL/min   
                
              



Glucose Level                                                79           59  #L


Calcium Level                                               8.7            8.5


Phosphorus Level                                            4.0


Magnesium Level                                             2.1            2.1








Consultation Date/Type/Reason


Admit Date/Time


Dayo 15, 2019 at 18:59


Initial Consult Date





Type of Consult


cv





24 HR Interval Summary


Free Text/Dictation


Feeling better, less shortness of breath, denies chest pain or palpitations





Exam/Review of Systems


Vital Signs


Vitals





Vital Signs


  Date      Temp  Pulse  Resp  B/P (MAP)   Pulse Ox  O2          O2 Flow    FiO2


Time                                                 Delivery    Rate


   1/17/19  97.8     60    19      104/71        94


     14:58                           (82)


   1/17/19                                           Nasal             3.0


     07:44                                           Cannula








Intake and Output





1/16/19 1/16/19 1/17/19





1515:00


23:00


07:00





IntakeIntake Total


250 ml


150 ml





OutputOutput Total


560 ml


250 ml





BalanceBalance


-310 ml


-100 ml














Exam


No apparent distress


Constitutional:  alert, oriented


Neck:  supple


Respiratory:  other (Coarse breath sounds bilaterally, no wheezing)


Cardiovascular:  irregular rhythm, other (S1-S2 heard)


Gastrointestinal:  soft, non-tender, bowel sounds


Extremities:  edema





Medications


Medications





Current Medications


Metoprolol Tartrate (Lopressor) 5 mg Q6H  PRN IV TACHYARDIA Last administered on


1/16/19at 00:22; Admin Dose 5 MG;  Start 1/15/19 at 20:00


IV Flush (NS 3 ml) 3 ml PER PROTOCOL IV ;  Start 1/15/19 at 20:00


Ondansetron HCl (Zofran Inj) 4 mg Q6H  PRN IV NAUSEA AND/OR VOMITING;  Start 


1/15/19 at 20:00


Nitroglycerin (Nitroglycerin (Sl Tab) 0.4 Mg) 1 tab Q5M  PRN SL CHEST PAIN;  


Start 1/15/19 at 20:00


Acetaminophen (Tylenol Tab) 650 mg Q6H  PRN PO PAIN LEVEL 1-3 OR FEVER;  Start 


1/15/19 at 20:00


Morphine Sulfate (morphine) 2 mg Q4H  PRN IV PAIN LEVEL 7-10;  Start 1/15/19 at 


20:00


Docusate Sodium (Colace) 100 mg Q12H  PRN PO CONSTIPATION;  Start 1/15/19 at 


20:00


Magnesium Hydroxide (Milk Of Mag) 30 ml DAILY  PRN PO CONSTIPATION;  Start 


1/15/19 at 20:00


Bisacodyl (Dulcolax Supp) 10 mg DAILY  PRN SC CONSTIPATION;  Start 1/15/19 at 


20:00


Aspirin (Halfprin) 81 mg DAILY PO  Last administered on 1/17/19at 08:02; Admin 


Dose 81 MG;  Start 1/17/19 at 09:00


Nicotine (Nicoderm 21 Mg/ 24hr) 1 patch DAILY TRANSDERM  Last administered on 


1/17/19at 08:02; Admin Dose 1 PATCH;  Start 1/16/19 at 13:00


Diltiazem HCl (Cardizem) 30 mg Q6  PRN PO sustained hr>130;  Start 1/16/19 at 


12:30


Metoprolol Tartrate (Lopressor) 25 mg Q8 PO  Last administered on 1/17/19at 


14:11; Admin Dose 25 MG;  Start 1/16/19 at 14:00


Apixaban (Eliquis) 5 mg BID PO  Last administered on 1/17/19at 08:02; Admin Dose


5 MG;  Start 1/16/19 at 21:00


Fluticasone/ Vilanterol (Breo Ellipta 100-25 Mcg Inh) 1 inh DAILY INH  Last 


administered on 1/17/19at 11:37; Admin Dose 1 INH;  Start 1/17/19 at 11:30


Tiotropium Bromide (Spiriva) 1 inh DAILY INH  Last administered on 1/17/19at 


11:38; Admin Dose 1 INH;  Start 1/17/19 at 11:30


Furosemide (Lasix) 20 mg DAILY IV ;  Start 1/18/19 at 09:00


Albuterol/ Ipratropium (Duoneb) 3 ml Q4H RESP THERAPY  PRN HHN SHORTNESS OF 


BREATH;  Start 1/17/19 at 15:00











Austin Reese DO           Jan 17, 2019 15:19

## 2019-01-17 NOTE — PN
Date/Time of Note


Date/Time of Note


DATE: 1/17/19 


TIME: 10:39





Assessment/Plan


VTE Prophylaxis


Risk score (from Ns)>0 risk:  2


SCD applied (from Ns):  Yes


Pharmacological prophylaxis:  apixaban





Lines/Catheters


IV Catheter Type (from [facility]):  Saline Lock


Urinary Cath still in place:  No





Assessment/Plan


Assessment/Plan


73-year-old male with:





1.  Syncopal episode, likely related to arrhythmia and atrial fibrillation.  CT 


head negative.  No neurological deficit.


Cardiac workup ongoing, heart rate control, cardiology following. 


Hemodynamically stable.  Will check orthostatics.





2. Atrial fibrillation, new onset, episodes of rapid ventricular rate.  TFTs 


within normal limits.  Heart rate better controlled, on beta-blockers now. 


Patient with episodes of nonsustained V. tach overnight and this morning, 


currently resolved.


Appreciate recommendations from cardiology.  


2D echocardiogram with diastolic dysfunction and ejection fraction of 50%.


Hemodynamically stable.





3.  Elevated troponin, possibly demand ischemia, in setting of episodes of 


tachyarrhythmia, no previous diagnosis of coronary artery disease as far as we 


know.  Troponin trended back to normal yesterday.  Patient on aspirin


Echocardiogram with ejection fraction of 50%.  Cardiology following.





4.  Hypertension: On beta-blockers, BP controlled so far.  Check orthostatics, 


titrate BP medication up as needed.





5.  Renal insufficiency, acute kidney injury versus CKD, unclear.  


Renal function more or less stable.  Off IV hydration, on diuretics currently.  


Will monitor closely renal function and electrolytes.





6.  Ascending aortic aneurysm, incidental finding, 4.7 cm, needs surveillance 


and monitoring, next CT angiogram due in 6 months to assess if stable size 


versus increasing size.  For now no further intervention, discussed with 


cardiology.





7.  Tobacco use, patient agreeable to quit, will order for nicotine patch.  


Possible COPD, starting patient on Brio Ellipta and Spiriva.





8.  T11 compression fracture, old, incidental finding.  Patient stable.  No 


signs of adenopathy seen or pulmonary masses seen.





Prophylaxis: Lovenox for DVT prophylaxis, patient tolerating p.o.


Disposition: Heart rate control, evaluating for acute coronary syndrome and 


monitoring renal function.


Result Diagram:  


1/17/19 0552                                                                    


           1/17/19 0552





Results 24hrs





Laboratory Tests


Test
                                1/16/19
14:32  1/16/19
19:47  1/17/19
05:52


Creatine Kinase                            1533  H        1507  H


Creatine Kinase Index                        2.3            2.2


Creatinine Kinase MB (Mass)               35.70  H       33.30  H


Troponin I                                 0.117          0.114


White Blood Count                                                         5.3  #


Red Blood Count                                                           5.30


Hemoglobin                                                                16.5


Hematocrit                                                                49.4


Mean Corpuscular Volume                                                   93.2


Mean Corpuscular Hemoglobin                                               31.1


Mean Corpuscular Hemoglobin
Concent  
              
                    33.4  



Red Cell Distribution Width                                              15.4  H


Platelet Count                                                            105  L


Mean Platelet Volume                                                     12.2  H


Immature Granulocytes %                                                 1.300  H


Neutrophils %                                                             67.9


Lymphocytes %                                                             18.2


Monocytes %                                                              11.8  H


Eosinophils %                                                              0.4


Basophils %                                                                0.4


Nucleated Red Blood Cells %                                               0.6  H


Immature Granulocytes #                                                 0.070  H


Neutrophils #                                                              3.6


Lymphocytes #                                                              1.0


Monocytes #                                                                0.6


Eosinophils #                                                              0.0


Basophils #                                                                0.0


Nucleated Red Blood Cells #                                                0.0


Sodium Level                                                               137


Potassium Level                                                            3.9


Chloride Level                                                             101


Carbon Dioxide Level                                                        30


Anion Gap                                                                   6  #


Blood Urea Nitrogen                                                        50  H


Creatinine                                                               1.40  H


Est Glomerular Filtrat Rate
mL/min   
              
                



Glucose Level                                                               79


Calcium Level                                                              8.7


Phosphorus Level                                                           4.0


Magnesium Level                                                            2.1








Subjective


24 Hr Interval Summary


Free Text/Dictation


Patient had 19 beats of nonsustained V. tach overnight and 6 weeks nonsustained 


V. tach this morning.  He remained asymptomatic throughout.  He is in atrial 


fibrillation currently.





Exam/Review of Systems


Vital Signs


Vitals





Vital Signs


  Date      Temp  Pulse  Resp  B/P (MAP)   Pulse Ox  O2          O2 Flow    FiO2


Time                                                 Delivery    Rate


   1/17/19          105


     08:41


   1/17/19                                           Nasal             3.0


     07:44                                           Cannula


   1/17/19  97.7           18      110/81        92


     07:30                           (91)








Intake and Output





1/16/19 1/16/19 1/17/19





1515:00


23:00


07:00





IntakeIntake Total


250 ml


150 ml





OutputOutput Total


560 ml


250 ml





BalanceBalance


-310 ml


-100 ml














Exam


Constitutional:  alert, oriented, well developed


Respiratory:  clear to auscultation, normal air movement


Cardiovascular:  nl pulses, irregular rhythm (Atrial fibrillation)


Gastrointestinal:  soft, non-tender


Musculoskeletal:  nl extremities to inspection


Extremities:  normal pulses, other (No edema, clubbing or cyanosis)


Neurological:  CNS II-XII intact, nl mental status, nl speech, nl strength





Medications


Medications





Current Medications


Metoprolol Tartrate (Lopressor) 5 mg Q6H  PRN IV TACHYARDIA Last administered on


1/16/19at 00:22; Admin Dose 5 MG;  Start 1/15/19 at 20:00


IV Flush (NS 3 ml) 3 ml PER PROTOCOL IV ;  Start 1/15/19 at 20:00


Ondansetron HCl (Zofran Inj) 4 mg Q6H  PRN IV NAUSEA AND/OR VOMITING;  Start 


1/15/19 at 20:00


Nitroglycerin (Nitroglycerin (Sl Tab) 0.4 Mg) 1 tab Q5M  PRN SL CHEST PAIN;  


Start 1/15/19 at 20:00


Acetaminophen (Tylenol Tab) 650 mg Q6H  PRN PO PAIN LEVEL 1-3 OR FEVER;  Start 


1/15/19 at 20:00


Morphine Sulfate (morphine) 2 mg Q4H  PRN IV PAIN LEVEL 7-10;  Start 1/15/19 at 


20:00


Docusate Sodium (Colace) 100 mg Q12H  PRN PO CONSTIPATION;  Start 1/15/19 at 


20:00


Magnesium Hydroxide (Milk Of Mag) 30 ml DAILY  PRN PO CONSTIPATION;  Start 


1/15/19 at 20:00


Bisacodyl (Dulcolax Supp) 10 mg DAILY  PRN NM CONSTIPATION;  Start 1/15/19 at 


20:00


Aspirin (Halfprin) 81 mg DAILY PO  Last administered on 1/17/19at 08:02; Admin 


Dose 81 MG;  Start 1/17/19 at 09:00


Nicotine (Nicoderm 21 Mg/ 24hr) 1 patch DAILY TRANSDERM  Last administered on 


1/17/19at 08:02; Admin Dose 1 PATCH;  Start 1/16/19 at 13:00


Diltiazem HCl (Cardizem) 30 mg Q6  PRN PO sustained hr>130;  Start 1/16/19 at 


12:30


Furosemide (Lasix) 20 mg BID  DIURETICS IV  Last administered on 1/17/19at 


05:53; Admin Dose 20 MG;  Start 1/16/19 at 12:30


Metoprolol Tartrate (Lopressor) 25 mg Q8 PO  Last administered on 1/17/19at 


05:53; Admin Dose 25 MG;  Start 1/16/19 at 14:00


Apixaban (Eliquis) 5 mg BID PO  Last administered on 1/17/19at 08:02; Admin Dose


5 MG;  Start 1/16/19 at 21:00





Imaging


Imaging


Repeat Chest x-ray pending.








Echocardiogram Report


 


Patient Name:  ZEYAD STYLES  Gender:       Male


MRN:           [id number]       Accession #:  [id number]


Birth Date:    1945   Study Date:   16-Jan-2019


Sonographer:   MARY BETH            Location:     [id number]


 


Ref. Physician: DANIEL RUSSELL


Quality: Good


 


Procedures: Transthoracic echocardiogram with complete 2D, M-Mode, and 


doppler examination.


Indications: Atrial Fibrillation new onset.


 


2D/M Mode                          Doppler


Measurement  Value  Normal Ranges  Measurement    Value   Normal Ranges


LVIDd 2D     4.1    3.5 - 5.6 cm   AV Mean Eric    0.8     m/sec


LVIDs 2D     3.4    2.1 - 4.1 cm   AV Mean PG     3.0     mmHg


LVPWd 2D     1.6    0.6 - 1.1 cm   AV VTI         17.3    cm


IVSd 2D      2.1    0.6 - 1.1 cm   AI Peak PG     46.0    mmHg


AoR Diam 2D  3.3    2.0 - 3.7 cm   AI Peak Eric    3.4     m/sec


LA/Ao 2D     1      0 - 1          AI PHT         6476.0  msec


EF 2D        36.0   50.0 - 65.0 %  LVOT Mean Eric  0.6     m/sec


LA Dimen 2D  3.7    2.3 - 4.0 cm   LVOT Mean PG   2.0     mmHg


IVC Diam     2.0    1.2 - 2.0      LVOT Peak Erci  1.0     m/sec


LVOT Peak PG   4.0     mmHg


TAPSE          1.6     cm


TR Peak Eric    3.5     m/sec


TR Peak PG     50.0    mmHg


RVSP           53.0    mmHg


RA Pressure    3.0


 


Findings


Left Ventricle: Lower limits of normal systolic function.  Normal left 


ventricular cavity size.  Severe left ventricular hypertrophy.  Ejection 


fraction is visually estimated at 50 %.  Abnormal Diastolic Function.


Right Ventricle: Normal right ventricular systolic function.  Moderate 


enlargement of right ventricle.


Left Atrium: There is severe enlargement of left atrium.


Right Atrium: There is severe enlargement of right atrium.


Mitral Valve: Mild mitral leaflet calcification.  Moderate mitral 


annular calcification.  Mild mitral valve regurgitation.  The 


regurgitation jet is eccentrically directed which may underestimate the 


severity of mitral regurgitation.


Aortic Valve: Aortic sclerosis without significant stenosis.  Aortic 


cusps appear mildly calcified.  Mild aortic valve regurgitation.


Tricuspid Valve: Normal appearance of the tricuspid valve.  Estimated 


peak PA systolic pressure 53 mmHg.  There is moderate tricuspid 


regurgitation.


Pulmonic Valve: There is trace pulmonic regurgitation.


Pericardium: Normal pericardium with no significant pericardial effusion.


Aorta: Normal aortic root.


IVC: Normal size and normal respiratory collapse consistent with normal 


right atrial pressure.


 


Conclusions


Lower limits of normal systolic function.  Normal left ventricular 


cavity size.  Severe left ventricular hypertrophy.  Ejection fraction is 


visually estimated at 50 %.  Abnormal Diastolic Function.


Normal right ventricular systolic function.  Moderate enlargement of 


right ventricle.


There is severe enlargement of left atrium.


There is severe enlargement of right atrium.


Mild mitral valve regurgitation.  The regurgitation jet is eccentrically 


directed which may underestimate the severity of mitral regurgitation.


Aortic sclerosis without significant stenosis. Mild aortic valve 


regurgitation.


Estimated peak PA systolic pressure 53 mmHg.  There is moderate 


tricuspid regurgitation.


Normal pericardium with no significant pericardial effusion.


 


Electronically Signed By:


Austin Reese


16-Jan-2019 18:52:43  -0800











EMERY RUSSELL                  Jan 17, 2019 10:48

## 2019-01-17 NOTE — RADRPT
Vent Rate: 77 bpm

RR Interval: 0 msec

[place] Interval: 0 msec

QRS Duration: 88 msec

QT Interval: 396 msec

QTC Interval: 448 msec

P-R-T Axis: 0 - -60 - 176 degrees

 

 Atrial fibrillation with premature ventricular or aberrantly conducted 

complexes

 Left anterior fascicular block

 Septal infarct , age undetermined

 ST  amp; T wave abnormality, consider anterolateral ischemia or 

digitalis effect

 Abnormal ECG

 

Electronically Signed By: Austin Reese

[id number]

## 2019-01-17 NOTE — NUR
END OF SHIFT REPORT



HAD 2 EPISODES OF UNSUYSTAINED VTACH (21:20 WITH 19 BEATS AND ANOTHER AT 0105 WITH 6 BEATS. 
MD INFORMED DURING THE FIRST EPISODE. PATIENT ASYMPTOMATIC; VITALS STABLE.



NO OTHER UNDUE DEVELOPMENT

## 2019-01-17 NOTE — NUR
RN NOTES:

PT HAD 18 BEATS OF V-TACH AROUND 11:20 AM. PT IS ASYMPTOMATIC, VS STABLE. DR. RUSSELL AND DR. LEES AWARE. WILL CONTINUE TO MONITOR PT.

## 2019-01-17 NOTE — NUR
EOSS:

NO ACUTE DISTRESS DURING DAY SHIFT. PT DID HAVE AN EPISODE OF 18 BEATS OF V-TACH AROUND 
11:30 AM HOWEVER, PT WAS ASYMPTOMATIC, NO CHEST PAIN OR PRESSURE, VS RISSA, MD AWARE. PT IS 
AOX3-4, ON 3L NC WITH O2 SATURATION AROUND 93%, DENIES PAIN, AMBULATES WITH ASSIST. PT WILL 
BE HAVING A STRESS TEST TOMORROW AND WILL BE NPO AFTER MIDNIGHT EXCEPT MEDS. ALL MEDICATIONS 
HAVE BEEN GIVEN AS PRESCRIBED. ALL PT'S NEEDS HAVE BEEN MET. WILL ENDORSE TO NIGHT SHIFT 
NURSE.

## 2019-01-18 VITALS — HEART RATE: 98 BPM

## 2019-01-18 VITALS — SYSTOLIC BLOOD PRESSURE: 115 MMHG | RESPIRATION RATE: 18 BRPM | HEART RATE: 61 BPM | DIASTOLIC BLOOD PRESSURE: 69 MMHG

## 2019-01-18 VITALS — SYSTOLIC BLOOD PRESSURE: 111 MMHG | HEART RATE: 58 BPM | RESPIRATION RATE: 20 BRPM | DIASTOLIC BLOOD PRESSURE: 75 MMHG

## 2019-01-18 VITALS — HEART RATE: 59 BPM | SYSTOLIC BLOOD PRESSURE: 141 MMHG | DIASTOLIC BLOOD PRESSURE: 80 MMHG | RESPIRATION RATE: 18 BRPM

## 2019-01-18 VITALS — HEART RATE: 64 BPM

## 2019-01-18 VITALS — RESPIRATION RATE: 18 BRPM | HEART RATE: 80 BPM | DIASTOLIC BLOOD PRESSURE: 72 MMHG | SYSTOLIC BLOOD PRESSURE: 116 MMHG

## 2019-01-18 VITALS — HEART RATE: 98 BPM | RESPIRATION RATE: 18 BRPM | DIASTOLIC BLOOD PRESSURE: 66 MMHG | SYSTOLIC BLOOD PRESSURE: 111 MMHG

## 2019-01-18 VITALS — HEART RATE: 83 BPM

## 2019-01-18 VITALS — HEART RATE: 111 BPM

## 2019-01-18 VITALS — HEART RATE: 95 BPM

## 2019-01-18 VITALS — HEART RATE: 65 BPM

## 2019-01-18 LAB
ABNORMAL IP MESSAGE: 1
ADD MAN DIFF?: NO
ANION GAP: 6 (ref 5–13)
BASOPHIL #: 0 10^3/UL (ref 0–0.1)
BASOPHILS %: 0.4 % (ref 0–2)
BLOOD UREA NITROGEN: 48 MG/DL (ref 7–20)
CALCIUM: 8.9 MG/DL (ref 8.4–10.2)
CARBON DIOXIDE: 31 MMOL/L (ref 21–31)
CHLORIDE: 99 MMOL/L (ref 97–110)
CREATININE: 1.26 MG/DL (ref 0.61–1.24)
EOSINOPHILS #: 0 10^3/UL (ref 0–0.5)
EOSINOPHILS %: 0.4 % (ref 0–7)
GLUCOSE: 75 MG/DL (ref 70–220)
HEMATOCRIT: 47.9 % (ref 42–52)
HEMOGLOBIN: 15.8 G/DL (ref 14–18)
IMMATURE GRANS #M: 0.06 10^3/UL (ref 0–0.03)
IMMATURE GRANS % (M): 1.3 % (ref 0–0.43)
LYMPHOCYTES #: 0.8 10^3/UL (ref 0.8–2.9)
LYMPHOCYTES %: 17.7 % (ref 15–51)
MAGNESIUM: 2.3 MG/DL (ref 1.7–2.5)
MEAN CORPUSCULAR HEMOGLOBIN: 31.1 PG (ref 29–33)
MEAN CORPUSCULAR HGB CONC: 33 G/DL (ref 32–37)
MEAN CORPUSCULAR VOLUME: 94.3 FL (ref 82–101)
MEAN PLATELET VOLUME: 12.7 FL (ref 7.4–10.4)
MONOCYTE #: 0.6 10^3/UL (ref 0.3–0.9)
MONOCYTES %: 12.3 % (ref 0–11)
NEUTROPHIL #: 3.2 10^3/UL (ref 1.6–7.5)
NEUTROPHILS %: 67.9 % (ref 39–77)
NUCLEATED RED BLOOD CELLS #: 0 10^3/UL (ref 0–0)
NUCLEATED RED BLOOD CELLS%: 0.4 /100WBC (ref 0–0)
PLATELET COUNT: 97 10^3/UL (ref 140–415)
POSITIVE DIFF: (no result)
POTASSIUM: 4.4 MMOL/L (ref 3.5–5.1)
RED BLOOD COUNT: 5.08 10^6/UL (ref 4.7–6.1)
RED CELL DISTRIBUTION WIDTH: 15.2 % (ref 11.5–14.5)
SODIUM: 136 MMOL/L (ref 135–144)
WHITE BLOOD COUNT: 4.6 10^3/UL (ref 4.8–10.8)

## 2019-01-18 RX ADMIN — METOPROLOL SUCCINATE SCH MG: 50 TABLET, EXTENDED RELEASE ORAL at 20:59

## 2019-01-18 RX ADMIN — ASPIRIN 1 MG: 81 TABLET, COATED ORAL at 14:33

## 2019-01-18 RX ADMIN — TIOTROPIUM BROMIDE 1 INH: 18 CAPSULE ORAL; RESPIRATORY (INHALATION) at 14:34

## 2019-01-18 RX ADMIN — FLUTICASONE FUROATE AND VILANTEROL TRIFENATATE SCH INH: 100; 25 POWDER RESPIRATORY (INHALATION) at 14:34

## 2019-01-18 RX ADMIN — APIXABAN SCH MG: 5 TABLET, FILM COATED ORAL at 14:33

## 2019-01-18 RX ADMIN — APIXABAN 1 MG: 5 TABLET, FILM COATED ORAL at 14:33

## 2019-01-18 RX ADMIN — REGADENOSON 1 MG: 0.08 INJECTION, SOLUTION INTRAVENOUS at 10:48

## 2019-01-18 RX ADMIN — LISINOPRIL SCH MG: 5 TABLET ORAL at 21:00

## 2019-01-18 RX ADMIN — LISINOPRIL 1 MG: 5 TABLET ORAL at 21:00

## 2019-01-18 RX ADMIN — ASPIRIN SCH MG: 81 TABLET, COATED ORAL at 14:33

## 2019-01-18 RX ADMIN — FLUTICASONE FUROATE AND VILANTEROL TRIFENATATE 1 INH: 100; 25 POWDER RESPIRATORY (INHALATION) at 14:34

## 2019-01-18 RX ADMIN — APIXABAN 1 MG: 5 TABLET, FILM COATED ORAL at 20:59

## 2019-01-18 RX ADMIN — METOPROLOL SUCCINATE 1 MG: 50 TABLET, EXTENDED RELEASE ORAL at 20:59

## 2019-01-18 RX ADMIN — NICOTINE 1 PATCH: 21 PATCH, EXTENDED RELEASE TRANSDERMAL at 14:35

## 2019-01-18 RX ADMIN — APIXABAN SCH MG: 5 TABLET, FILM COATED ORAL at 20:59

## 2019-01-18 RX ADMIN — NICOTINE SCH PATCH: 21 PATCH, EXTENDED RELEASE TRANSDERMAL at 14:35

## 2019-01-18 NOTE — NUR
END OF SHIFT REPORT



HAD ANOTHER  EPISODE OF VTACH, 19 BEATS, WHILE PT SLEEPING. STILL AFIB, RATE CONTROLLED 
(<100).



NO COMPLAINT.



KEPT ON NPO FOR STRESS TEST TODAY.

## 2019-01-18 NOTE — CONS
Date/Time of Note


Date/Time of Note


DATE: 1/18/19 


TIME: 12:23





Assessment/Plan


Newly diagnosed atrial fibrillation


Acute decompensated systolic congestive heart failure


Renal dysfunction


Hypertension


Cardiomyopathy with ejection fraction 50%


Mitral and tricuspid valve regurgitation


NSVT





-Less VT after increase beta-blocker, continue as heart rate and blood pressure 


permits


-Troponins are mildly elevated and trending down.  Possibly secondary to 


decompensated congestive heart failure, atrial fibrillation with rapid 


ventricular rates.  Patient for nuclear cardiac perfusion study today


-Continue aspirin therapy, no statin at the current time given abnormal LFTs.


-Maintain potassium above 4.0 and magnesium above 2.0


-Adjust Lasix to p.o.


Result Diagram:  


1/18/19 0529                                                                    


           1/18/19 0529





Results 24hrs





Laboratory Tests


       Test
                                1/17/19
12:59  1/18/19
05:29


       Sodium Level                                 136            136


       Potassium Level                              3.8            4.4


       Chloride Level                               100             99


       Carbon Dioxide Level                          31             31


       Anion Gap                                      5              6


       Blood Urea Nitrogen                          51  H          48  H


       Creatinine                                 1.33  H        1.26  H


       Est Glomerular Filtrat Rate
mL/min     
              



       Glucose Level                               59  #L           75


       Calcium Level                                8.5            8.9


       Magnesium Level                              2.1            2.3


       White Blood Count                                          4.6  L


       Red Blood Count                                            5.08


       Hemoglobin                                                 15.8


       Hematocrit                                                 47.9


       Mean Corpuscular Volume                                    94.3


       Mean Corpuscular Hemoglobin                                31.1


       Mean Corpuscular Hemoglobin
Concent  
                    33.0  



       Red Cell Distribution Width                               15.2  H


       Platelet Count                                              97  L


       Mean Platelet Volume                                      12.7  H


       Immature Granulocytes %                                  1.300  H


       Neutrophils %                                              67.9


       Lymphocytes %                                              17.7


       Monocytes %                                               12.3  H


       Eosinophils %                                               0.4


       Basophils %                                                 0.4


       Nucleated Red Blood Cells %                                0.4  H


       Immature Granulocytes #                                  0.060  H


       Neutrophils #                                               3.2


       Lymphocytes #                                               0.8


       Monocytes #                                                 0.6


       Eosinophils #                                               0.0


       Basophils #                                                 0.0


       Nucleated Red Blood Cells #                                 0.0








Consultation Date/Type/Reason


Admit Date/Time


Dayo 15, 2019 at 18:59


Initial Consult Date





Type of Consult


cv





24 HR Interval Summary


Free Text/Dictation


Patient feeling better today, denies any shortness of breath.  Denies any chest 


pain





Exam/Review of Systems


Vital Signs


Vitals





Vital Signs


  Date      Temp  Pulse  Resp  B/P (MAP)   Pulse Ox  O2          O2 Flow    FiO2


Time                                                 Delivery    Rate


   1/18/19           64


     08:40


   1/18/19  97.6           20      111/75       100


     07:19                           (87)


   1/17/19                                           Nasal             3.0


     20:00                                           Cannula








Intake and Output





1/17/19 1/17/19 1/18/19





1515:00


23:00


07:00





IntakeIntake Total


600 ml


50 ml





BalanceBalance


600 ml


50 ml














Exam


No apparent distress


Constitutional:  alert, oriented


Head:  normocephalic


Respiratory:  other (Coarse breath sounds bilaterally, no wheezing)


Cardiovascular:  irregular rhythm, other (S1-S2 heard)


Gastrointestinal:  soft, non-tender, bowel sounds


Extremities:  edema





Medications


Medications





Current Medications


Metoprolol Tartrate (Lopressor) 5 mg Q6H  PRN IV TACHYARDIA Last administered on


1/16/19at 00:22; Admin Dose 5 MG;  Start 1/15/19 at 20:00


IV Flush (NS 3 ml) 3 ml PER PROTOCOL IV ;  Start 1/15/19 at 20:00


Ondansetron HCl (Zofran Inj) 4 mg Q6H  PRN IV NAUSEA AND/OR VOMITING;  Start 


1/15/19 at 20:00


Nitroglycerin (Nitroglycerin (Sl Tab) 0.4 Mg) 1 tab Q5M  PRN SL CHEST PAIN;  


Start 1/15/19 at 20:00


Acetaminophen (Tylenol Tab) 650 mg Q6H  PRN PO PAIN LEVEL 1-3 OR FEVER;  Start 


1/15/19 at 20:00


Morphine Sulfate (morphine) 2 mg Q4H  PRN IV PAIN LEVEL 7-10;  Start 1/15/19 at 


20:00


Docusate Sodium (Colace) 100 mg Q12H  PRN PO CONSTIPATION;  Start 1/15/19 at 


20:00


Magnesium Hydroxide (Milk Of Mag) 30 ml DAILY  PRN PO CONSTIPATION;  Start 


1/15/19 at 20:00


Bisacodyl (Dulcolax Supp) 10 mg DAILY  PRN CO CONSTIPATION;  Start 1/15/19 at 


20:00


Aspirin (Halfprin) 81 mg DAILY PO  Last administered on 1/17/19at 08:02; Admin 


Dose 81 MG;  Start 1/17/19 at 09:00


Nicotine (Nicoderm 21 Mg/ 24hr) 1 patch DAILY TRANSDERM  Last administered on 


1/17/19at 08:02; Admin Dose 1 PATCH;  Start 1/16/19 at 13:00


Diltiazem HCl (Cardizem) 30 mg Q6  PRN PO sustained hr>130;  Start 1/16/19 at 


12:30


Apixaban (Eliquis) 5 mg BID PO  Last administered on 1/17/19at 20:30; Admin Dose


5 MG;  Start 1/16/19 at 21:00


Fluticasone/ Vilanterol (Breo Ellipta 100-25 Mcg Inh) 1 inh DAILY INH  Last 


administered on 1/17/19at 11:37; Admin Dose 1 INH;  Start 1/17/19 at 11:30


Tiotropium Bromide (Spiriva) 1 inh DAILY INH  Last administered on 1/17/19at 


11:38; Admin Dose 1 INH;  Start 1/17/19 at 11:30


Furosemide (Lasix) 20 mg DAILY IV ;  Start 1/18/19 at 09:00


Albuterol/ Ipratropium (Duoneb) 3 ml Q4H RESP THERAPY  PRN HHN SHORTNESS OF 


BREATH;  Start 1/17/19 at 15:00


Metoprolol Tartrate (Lopressor) 50 mg BID PO  Last administered on 1/17/19at 


20:31; Admin Dose 50 MG;  Start 1/17/19 at 21:00











Austin Reese DO           Jan 18, 2019 12:24

## 2019-01-18 NOTE — PN
Date/Time of Note


Date/Time of Note


DATE: 1/18/19 


TIME: 12:01





Assessment/Plan


VTE Prophylaxis


Risk score (from Ns)>0 risk:  3


SCD applied (from Ns):  Yes


Pharmacological prophylaxis:  apixaban





Lines/Catheters


IV Catheter Type (from [facility]):  Saline Lock


Urinary Cath still in place:  No





Assessment/Plan


Assessment/Plan


73-year-old male with:





1.  Syncopal episode, likely related to arrhythmia and atrial fibrillation.  CT 


head negative.  No neurological deficit.


Cardiac workup ongoing, heart rate control, cardiology following.  Patient was 


noted to have episode of nonsustained V. tach over the past 48 hours.  Stress 


test done this a.m. and results pending.  


Hemodynamically stable. 





2. Atrial fibrillation, new onset, episodes of rapid ventricular rate.  TFTs 


within normal limits.  Heart rate better controlled, on beta-blockers now. 


Patient with episodes of nonsustained V. tach.


Appreciate recommendations from cardiology.  


2D echocardiogram with diastolic dysfunction and ejection fraction of 50%.


Hemodynamically stable.





3.  Elevated troponin, possibly demand ischemia, in setting of episodes of 


tachyarrhythmia, no previous diagnosis of coronary artery disease as far as we 


know.  Troponin trended back to normal yesterday.  Patient on aspirin


Echocardiogram with ejection fraction of 50%.  Patient had Lexiscan stress test 


this a.m., results pending.  





4.  Hypertension: On beta-blockers, BP controlled so far.





5.  Renal insufficiency, acute kidney injury versus CKD, unclear.  


Renal function improved.  Continue current diuretic dosing. 





6.  Ascending aortic aneurysm, incidental finding, 4.7 cm, needs surveillance 


and monitoring, next CT angiogram due in 6 months to assess if stable size 


versus increasing size.  For now no further intervention, discussed with ca


rdiology.





7.  Tobacco use, patient agreeable to quit, will order for nicotine patch.  


Possible COPD, on Brio Ellipta and Spiriva now, along with duonebs as needed..





8.  T11 compression fracture, old, incidental finding.  Patient stable.  No 


signs of adenopathy seen or pulmonary masses seen.





Prophylaxis: Lovenox for DVT prophylaxis, patient tolerating p.o.


Disposition: Heart rate control, follow-up stress test results and monitor renal


function.


Result Diagram:  


1/18/19 0529                                                                    


           1/18/19 0529





Results 24hrs





Laboratory Tests


       Test
                                1/17/19
12:59  1/18/19
05:29


       Sodium Level                                 136            136


       Potassium Level                              3.8            4.4


       Chloride Level                               100             99


       Carbon Dioxide Level                          31             31


       Anion Gap                                      5              6


       Blood Urea Nitrogen                          51  H          48  H


       Creatinine                                 1.33  H        1.26  H


       Est Glomerular Filtrat Rate
mL/min     
              



       Glucose Level                               59  #L           75


       Calcium Level                                8.5            8.9


       Magnesium Level                              2.1            2.3


       White Blood Count                                          4.6  L


       Red Blood Count                                            5.08


       Hemoglobin                                                 15.8


       Hematocrit                                                 47.9


       Mean Corpuscular Volume                                    94.3


       Mean Corpuscular Hemoglobin                                31.1


       Mean Corpuscular Hemoglobin
Concent  
                    33.0  



       Red Cell Distribution Width                               15.2  H


       Platelet Count                                              97  L


       Mean Platelet Volume                                      12.7  H


       Immature Granulocytes %                                  1.300  H


       Neutrophils %                                              67.9


       Lymphocytes %                                              17.7


       Monocytes %                                               12.3  H


       Eosinophils %                                               0.4


       Basophils %                                                 0.4


       Nucleated Red Blood Cells %                                0.4  H


       Immature Granulocytes #                                  0.060  H


       Neutrophils #                                               3.2


       Lymphocytes #                                               0.8


       Monocytes #                                                 0.6


       Eosinophils #                                               0.0


       Basophils #                                                 0.0


       Nucleated Red Blood Cells #                                 0.0








Subjective


24 Hr Interval Summary


Free Text/Dictation


Patient with no complaints, no chest pain, he feels like his respiratory status 


is stable to improved, will decrease O2 to 2 L nasal cannula and titrate down.  


Status post stress test this morning.





Exam/Review of Systems


Vital Signs


Vitals





Vital Signs


  Date      Temp  Pulse  Resp  B/P (MAP)   Pulse Ox  O2          O2 Flow    FiO2


Time                                                 Delivery    Rate


   1/18/19           64


     08:40


   1/18/19  97.6           20      111/75       100


     07:19                           (87)


   1/17/19                                           Nasal             3.0


     20:00                                           Cannula








Intake and Output





1/17/19 1/17/19 1/18/19





1515:00


23:00


07:00





IntakeIntake Total


600 ml


50 ml





BalanceBalance


600 ml


50 ml














Exam


Constitutional:  alert, oriented, well developed


Respiratory:  clear to auscultation, normal air movement


Cardiovascular:  irregular rhythm (Atrial fibrillation)


Gastrointestinal:  soft, non-tender


Musculoskeletal:  nl extremities to inspection


Extremities:  normal pulses, other (No edema, clubbing or cyanosis)


Neurological:  CNS II-XII intact, nl mental status, nl speech, nl strength





Medications


Medications





Current Medications


Metoprolol Tartrate (Lopressor) 5 mg Q6H  PRN IV TACHYARDIA Last administered on


1/16/19at 00:22; Admin Dose 5 MG;  Start 1/15/19 at 20:00


IV Flush (NS 3 ml) 3 ml PER PROTOCOL IV ;  Start 1/15/19 at 20:00


Ondansetron HCl (Zofran Inj) 4 mg Q6H  PRN IV NAUSEA AND/OR VOMITING;  Start 


1/15/19 at 20:00


Nitroglycerin (Nitroglycerin (Sl Tab) 0.4 Mg) 1 tab Q5M  PRN SL CHEST PAIN;  


Start 1/15/19 at 20:00


Acetaminophen (Tylenol Tab) 650 mg Q6H  PRN PO PAIN LEVEL 1-3 OR FEVER;  Start 


1/15/19 at 20:00


Morphine Sulfate (morphine) 2 mg Q4H  PRN IV PAIN LEVEL 7-10;  Start 1/15/19 at 


20:00


Docusate Sodium (Colace) 100 mg Q12H  PRN PO CONSTIPATION;  Start 1/15/19 at 


20:00


Magnesium Hydroxide (Milk Of Mag) 30 ml DAILY  PRN PO CONSTIPATION;  Start 


1/15/19 at 20:00


Bisacodyl (Dulcolax Supp) 10 mg DAILY  PRN ME CONSTIPATION;  Start 1/15/19 at 


20:00


Aspirin (Halfprin) 81 mg DAILY PO  Last administered on 1/17/19at 08:02; Admin 


Dose 81 MG;  Start 1/17/19 at 09:00


Nicotine (Nicoderm 21 Mg/ 24hr) 1 patch DAILY TRANSDERM  Last administered on 


1/17/19at 08:02; Admin Dose 1 PATCH;  Start 1/16/19 at 13:00


Diltiazem HCl (Cardizem) 30 mg Q6  PRN PO sustained hr>130;  Start 1/16/19 at 


12:30


Apixaban (Eliquis) 5 mg BID PO  Last administered on 1/17/19at 20:30; Admin Dose


5 MG;  Start 1/16/19 at 21:00


Fluticasone/ Vilanterol (Breo Ellipta 100-25 Mcg Inh) 1 inh DAILY INH  Last 


administered on 1/17/19at 11:37; Admin Dose 1 INH;  Start 1/17/19 at 11:30


Tiotropium Bromide (Spiriva) 1 inh DAILY INH  Last administered on 1/17/19at 


11:38; Admin Dose 1 INH;  Start 1/17/19 at 11:30


Furosemide (Lasix) 20 mg DAILY IV ;  Start 1/18/19 at 09:00


Albuterol/ Ipratropium (Duoneb) 3 ml Q4H RESP THERAPY  PRN HHN SHORTNESS OF 


BREATH;  Start 1/17/19 at 15:00


Metoprolol Tartrate (Lopressor) 50 mg BID PO  Last administered on 1/17/19at 


20:31; Admin Dose 50 MG;  Start 1/17/19 at 21:00











EMERY RUSSELL                  Jan 18, 2019 12:11

## 2019-01-19 VITALS — DIASTOLIC BLOOD PRESSURE: 69 MMHG | HEART RATE: 93 BPM | SYSTOLIC BLOOD PRESSURE: 116 MMHG | RESPIRATION RATE: 18 BRPM

## 2019-01-19 VITALS — HEART RATE: 110 BPM

## 2019-01-19 VITALS — HEART RATE: 67 BPM | RESPIRATION RATE: 16 BRPM | SYSTOLIC BLOOD PRESSURE: 123 MMHG | DIASTOLIC BLOOD PRESSURE: 78 MMHG

## 2019-01-19 VITALS — DIASTOLIC BLOOD PRESSURE: 81 MMHG | HEART RATE: 68 BPM | SYSTOLIC BLOOD PRESSURE: 119 MMHG | RESPIRATION RATE: 18 BRPM

## 2019-01-19 VITALS — SYSTOLIC BLOOD PRESSURE: 135 MMHG | DIASTOLIC BLOOD PRESSURE: 78 MMHG | RESPIRATION RATE: 18 BRPM | HEART RATE: 60 BPM

## 2019-01-19 VITALS — HEART RATE: 107 BPM

## 2019-01-19 VITALS — HEART RATE: 59 BPM | SYSTOLIC BLOOD PRESSURE: 135 MMHG | RESPIRATION RATE: 18 BRPM | DIASTOLIC BLOOD PRESSURE: 87 MMHG

## 2019-01-19 VITALS — HEART RATE: 67 BPM

## 2019-01-19 VITALS — HEART RATE: 101 BPM | RESPIRATION RATE: 20 BRPM | SYSTOLIC BLOOD PRESSURE: 101 MMHG | DIASTOLIC BLOOD PRESSURE: 79 MMHG

## 2019-01-19 VITALS — HEART RATE: 96 BPM

## 2019-01-19 VITALS — HEART RATE: 106 BPM

## 2019-01-19 VITALS — HEART RATE: 99 BPM

## 2019-01-19 LAB
ADD MAN DIFF?: NO
ANION GAP: 5 (ref 5–13)
BASOPHIL #: 0 10^3/UL (ref 0–0.1)
BASOPHILS %: 0.2 % (ref 0–2)
BLOOD UREA NITROGEN: 42 MG/DL (ref 7–20)
CALCIUM: 8.9 MG/DL (ref 8.4–10.2)
CARBON DIOXIDE: 30 MMOL/L (ref 21–31)
CHLORIDE: 101 MMOL/L (ref 97–110)
CREATININE: 1.15 MG/DL (ref 0.61–1.24)
EOSINOPHILS #: 0 10^3/UL (ref 0–0.5)
EOSINOPHILS %: 0.2 % (ref 0–7)
GLUCOSE: 91 MG/DL (ref 70–220)
HEMATOCRIT: 46.5 % (ref 42–52)
HEMOGLOBIN: 15.4 G/DL (ref 14–18)
IMMATURE GRANS #M: 0.06 10^3/UL (ref 0–0.03)
IMMATURE GRANS % (M): 1.2 % (ref 0–0.43)
LYMPHOCYTES #: 0.8 10^3/UL (ref 0.8–2.9)
LYMPHOCYTES %: 16.6 % (ref 15–51)
MAGNESIUM: 2.2 MG/DL (ref 1.7–2.5)
MEAN CORPUSCULAR HEMOGLOBIN: 31 PG (ref 29–33)
MEAN CORPUSCULAR HGB CONC: 33.1 G/DL (ref 32–37)
MEAN CORPUSCULAR VOLUME: 93.8 FL (ref 82–101)
MEAN PLATELET VOLUME: 12.5 FL (ref 7.4–10.4)
MONOCYTE #: 0.6 10^3/UL (ref 0.3–0.9)
MONOCYTES %: 12 % (ref 0–11)
NEUTROPHIL #: 3.5 10^3/UL (ref 1.6–7.5)
NEUTROPHILS %: 69.8 % (ref 39–77)
NUCLEATED RED BLOOD CELLS #: 0 10^3/UL (ref 0–0)
NUCLEATED RED BLOOD CELLS%: 0 /100WBC (ref 0–0)
PHOSPHORUS: 3.5 MG/DL (ref 2.5–4.9)
PLATELET COUNT: 110 10^3/UL (ref 140–415)
POTASSIUM: 4.2 MMOL/L (ref 3.5–5.1)
RED BLOOD COUNT: 4.96 10^6/UL (ref 4.7–6.1)
RED CELL DISTRIBUTION WIDTH: 15.4 % (ref 11.5–14.5)
SODIUM: 136 MMOL/L (ref 135–144)
WHITE BLOOD COUNT: 5.1 10^3/UL (ref 4.8–10.8)

## 2019-01-19 RX ADMIN — APIXABAN SCH MG: 5 TABLET, FILM COATED ORAL at 20:55

## 2019-01-19 RX ADMIN — APIXABAN 1 MG: 5 TABLET, FILM COATED ORAL at 08:31

## 2019-01-19 RX ADMIN — FLUTICASONE FUROATE AND VILANTEROL TRIFENATATE 1 INH: 100; 25 POWDER RESPIRATORY (INHALATION) at 08:34

## 2019-01-19 RX ADMIN — METOPROLOL SUCCINATE 1 MG: 50 TABLET, EXTENDED RELEASE ORAL at 20:56

## 2019-01-19 RX ADMIN — FLUTICASONE FUROATE AND VILANTEROL TRIFENATATE SCH INH: 100; 25 POWDER RESPIRATORY (INHALATION) at 08:34

## 2019-01-19 RX ADMIN — NICOTINE SCH PATCH: 21 PATCH, EXTENDED RELEASE TRANSDERMAL at 08:34

## 2019-01-19 RX ADMIN — METOPROLOL TARTRATE PRN MG: 5 INJECTION, SOLUTION INTRAVENOUS at 22:09

## 2019-01-19 RX ADMIN — ASPIRIN 1 MG: 81 TABLET, COATED ORAL at 08:33

## 2019-01-19 RX ADMIN — METOPROLOL SUCCINATE SCH MG: 50 TABLET, EXTENDED RELEASE ORAL at 20:56

## 2019-01-19 RX ADMIN — LISINOPRIL SCH MG: 5 TABLET ORAL at 20:56

## 2019-01-19 RX ADMIN — METOPROLOL SUCCINATE 1 MG: 50 TABLET, EXTENDED RELEASE ORAL at 08:33

## 2019-01-19 RX ADMIN — FUROSEMIDE 1 MG: 40 TABLET ORAL at 05:43

## 2019-01-19 RX ADMIN — LISINOPRIL 1 MG: 5 TABLET ORAL at 20:56

## 2019-01-19 RX ADMIN — APIXABAN SCH MG: 5 TABLET, FILM COATED ORAL at 08:31

## 2019-01-19 RX ADMIN — NICOTINE 1 PATCH: 21 PATCH, EXTENDED RELEASE TRANSDERMAL at 08:34

## 2019-01-19 RX ADMIN — ASPIRIN SCH MG: 81 TABLET, COATED ORAL at 08:33

## 2019-01-19 RX ADMIN — APIXABAN 1 MG: 5 TABLET, FILM COATED ORAL at 20:55

## 2019-01-19 RX ADMIN — METOPROLOL TARTRATE 1 MG: 5 INJECTION, SOLUTION INTRAVENOUS at 22:09

## 2019-01-19 RX ADMIN — FUROSEMIDE SCH MG: 40 TABLET ORAL at 05:43

## 2019-01-19 RX ADMIN — TIOTROPIUM BROMIDE 1 INH: 18 CAPSULE ORAL; RESPIRATORY (INHALATION) at 08:35

## 2019-01-19 RX ADMIN — METOPROLOL SUCCINATE SCH MG: 50 TABLET, EXTENDED RELEASE ORAL at 08:33

## 2019-01-19 NOTE — PDOCDIS
Discharge Instructions


CONDITION


                [id number]
Patient Condition:  [id number]
Stable








HOME CARE INSTRUCTIONS:


                [id number]
Diet Instructions:  [id number]
[id number]








FOLLOW UP/APPOINTMENTS


Follow-up Plan


Follow-up with cardiology within 1-2 weeks, patient to be referred through [facility], he has been seen inpatient by Dr. Reese


Follow-up with primary care physician within 1 week











EMERY RUSSELL                  Jan 19, 2019 10:57

## 2019-01-19 NOTE — NUR
Discharge coordination;



Sent over order detail and clinical report to [facility] CM Team; faxed to [phone]. 
 Will await their call back once arrangements made.  

-------------------------------------------------------------------------------

Addendum: 01/19/19 at 1606 by CAROL VARGAS RN CM

-------------------------------------------------------------------------------



[facility] Follow Up;

Spoke with on-call CM and they are making arrangements for home oxygen.  Joyce also have 
arranged home health for Patient; [facility] [phone].

## 2019-01-19 NOTE — NUR
END OF SHIFT REPORT



STRESS DONE YESTERDAY SHOWED NO PERFUSION DEFECT. EF IS AT 26%

STILL AFIB ON MONITOR WITH OCCASIONAL PVCS BUT NO MORE RUNS OF VTACH.

NO COMPLAINT. VITALS WNL.

## 2019-01-19 NOTE — PN
Date/Time of Note


Date/Time of Note


DATE: 1/19/19 


TIME: 10:37





Assessment/Plan


VTE Prophylaxis


Risk score (from Ns)>0 risk:  2


SCD applied (from Ns):  Yes


Pharmacological prophylaxis:  apixaban





Lines/Catheters


IV Catheter Type (from [facility]):  Saline Lock


Urinary Cath still in place:  No





Assessment/Plan


Assessment/Plan


73-year-old male with:





1.  Syncopal episode, likely related to arrhythmia and atrial fibrillation.  CT 


head negative.  No neurological deficit.


Cardiac workup ongoing, heart rate control, cardiology following.  Patient was 


noted to have episode of nonsustained V. tach over the past 48 hours.  


Stress test negative for acute ischemia 


No syncopal episode witnessed during this admission, heart rate controlled.  


Hemodynamically stable.





2. Atrial fibrillation, new onset, episodes of rapid ventricular rate.  TFTs 


within normal limits.  Heart rate better controlled, on beta-blockers now. 


Patient with episodes of nonsustained V. tach.


Appreciate recommendations from cardiology.  


2D echocardiogram with diastolic dysfunction and ejection fraction of 50%.


Hemodynamically stable.





3.  Elevated troponin, possibly demand ischemia, in setting of episodes of 


tachyarrhythmia, no previous diagnosis of coronary artery disease as far as we 


know.  Troponin trended back to normal and stress test negative for acute 


ischemia. 


Echocardiogram with ejection fraction of 50%.  


Continue ASA





4.  Hypertension: On beta-blockers, BP controlled so far.





5.  Renal insufficiency, acute kidney injury resolved.  Renal function much 


improved.  Tolerating Lasix daily. 





6.  Ascending aortic aneurysm, incidental finding, 4.7 cm, needs surveillance 


and monitoring, next CT angiogram due in 6 months to assess if stable size 


versus increasing size.  For now no further intervention, discussed with 


cardiology.





7.  Tobacco use, patient agreeable to quit, will order for nicotine patch.  


Likely COPD, plan to discharge her on Brio Ellipta and Spiriva. 





8.  T11 compression fracture, old, incidental finding.  Patient stable.  No 


signs of adenopathy seen or pulmonary masses seen.





Prophylaxis: Lovenox for DVT prophylaxis, patient tolerating p.o.


Disposition: Follow-up O2 sats on room air at rest and with exertion, if remains


above 88-90%, patient likely to be discharged home this afternoon on the current


cardiac medication.  Discharge planning already discussed with cardiology, Dr. Reese, yesterday who agreed with plan on discharge today as long as blood 


pressure tolerating current medications and heart rate controlled from the 


cardiac standpoint.


Result Diagram:  


1/19/19 0558                                                                    


           1/19/19 0558





Results 24hrs





Laboratory Tests


               Test
                                1/19/19
05:58


               White Blood Count                            5.1


               Red Blood Count                             4.96


               Hemoglobin                                  15.4


               Hematocrit                                  46.5


               Mean Corpuscular Volume                     93.8


               Mean Corpuscular Hemoglobin                 31.0


               Mean Corpuscular Hemoglobin
Concent        33.1  



               Red Cell Distribution Width                15.4  H


               Platelet Count                              110  L


               Mean Platelet Volume                       12.5  H


               Immature Granulocytes %                   1.200  H


               Neutrophils %                               69.8


               Lymphocytes %                               16.6


               Monocytes %                                12.0  H


               Eosinophils %                                0.2


               Basophils %                                  0.2


               Nucleated Red Blood Cells %                  0.0


               Immature Granulocytes #                   0.060  H


               Neutrophils #                                3.5


               Lymphocytes #                                0.8


               Monocytes #                                  0.6


               Eosinophils #                                0.0


               Basophils #                                  0.0


               Nucleated Red Blood Cells #                  0.0


               Sodium Level                                 136


               Potassium Level                              4.2


               Chloride Level                               101


               Carbon Dioxide Level                          30


               Anion Gap                                      5


               Blood Urea Nitrogen                          42  H


               Creatinine                                  1.15


               Est Glomerular Filtrat Rate
mL/min     



               Glucose Level                                 91


               Calcium Level                                8.9


               Phosphorus Level                             3.5


               Magnesium Level                              2.2








Subjective


24 Hr Interval Summary


Free Text/Dictation


 Patient doing fairly well.  Will DC supplemental oxygen and check O2 sats on 


room air and with ambulation.  As long as above 88% no need for supplemental O2 


at home.  Patient likely to have emphysema and COPD, still active tobacco user.





Exam/Review of Systems


Vital Signs


Vitals





Vital Signs


  Date      Temp  Pulse  Resp  B/P (MAP)   Pulse Ox  O2          O2 Flow    FiO2


Time                                                 Delivery    Rate


   1/19/19          106


     08:23


   1/19/19  98.1           16      123/78        90


     07:21                           (93)


   1/17/19                                           Nasal             3.0


     20:00                                           Cannula








Intake and Output





1/18/19 1/18/19 1/19/19





1515:00


23:00


07:00





IntakeIntake Total


600 ml


250 ml





OutputOutput Total


300 ml


700 ml





BalanceBalance


300 ml


-450 ml














Exam


Constitutional:  alert, oriented, well developed


Respiratory:  clear to auscultation, normal air movement, other (No wheezing or 


rales.)


Cardiovascular:  regular rate and rhythm, nl pulses


Gastrointestinal:  soft, non-tender


Musculoskeletal:  nl extremities to inspection


Extremities:  normal pulses, other (No edema, clubbing or cyanosis)


Neurological:  CNS II-XII intact, nl mental status, nl speech, nl strength





Medications


Medications





Current Medications


Metoprolol Tartrate (Lopressor) 5 mg Q6H  PRN IV TACHYARDIA Last administered on


1/16/19at 00:22; Admin Dose 5 MG;  Start 1/15/19 at 20:00


IV Flush (NS 3 ml) 3 ml PER PROTOCOL IV ;  Start 1/15/19 at 20:00


Ondansetron HCl (Zofran Inj) 4 mg Q6H  PRN IV NAUSEA AND/OR VOMITING;  Start 


1/15/19 at 20:00


Nitroglycerin (Nitroglycerin (Sl Tab) 0.4 Mg) 1 tab Q5M  PRN SL CHEST PAIN;  


Start 1/15/19 at 20:00


Acetaminophen (Tylenol Tab) 650 mg Q6H  PRN PO PAIN LEVEL 1-3 OR FEVER;  Start 


1/15/19 at 20:00


Morphine Sulfate (morphine) 2 mg Q4H  PRN IV PAIN LEVEL 7-10;  Start 1/15/19 at 


20:00


Docusate Sodium (Colace) 100 mg Q12H  PRN PO CONSTIPATION;  Start 1/15/19 at 


20:00


Magnesium Hydroxide (Milk Of Mag) 30 ml DAILY  PRN PO CONSTIPATION;  Start 


1/15/19 at 20:00


Bisacodyl (Dulcolax Supp) 10 mg DAILY  PRN CA CONSTIPATION;  Start 1/15/19 at 


20:00


Aspirin (Halfprin) 81 mg DAILY PO  Last administered on 1/19/19at 08:33; Admin 


Dose 81 MG;  Start 1/17/19 at 09:00


Nicotine (Nicoderm 21 Mg/ 24hr) 1 patch DAILY TRANSDERM  Last administered on 


1/19/19at 08:34; Admin Dose 1 PATCH;  Start 1/16/19 at 13:00


Diltiazem HCl (Cardizem) 30 mg Q6  PRN PO sustained hr>130;  Start 1/16/19 at 


12:30


Apixaban (Eliquis) 5 mg BID PO  Last administered on 1/19/19at 08:31; Admin Dose


5 MG;  Start 1/16/19 at 21:00


Fluticasone/ Vilanterol (Breo Ellipta 100-25 Mcg Inh) 1 inh DAILY INH  Last 


administered on 1/19/19at 08:34; Admin Dose 1 INH;  Start 1/17/19 at 11:30


Tiotropium Bromide (Spiriva) 1 inh DAILY INH  Last administered on 1/19/19at 


08:35; Admin Dose 1 INH;  Start 1/17/19 at 11:30


Albuterol/ Ipratropium (Duoneb) 3 ml Q4H RESP THERAPY  PRN HHN SHORTNESS OF 


BREATH;  Start 1/17/19 at 15:00


Furosemide (Lasix) 40 mg DAILY@0600 PO  Last administered on 1/19/19at 05:43; 


Admin Dose 40 MG;  Start 1/19/19 at 06:00


Metoprolol Succinate (Toprol Xl) 50 mg BID PO  Last administered on 1/19/19at 


08:33; Admin Dose 50 MG;  Start 1/18/19 at 21:00


Lisinopril (Zestril) 2.5 mg QHS PO  Last administered on 1/18/19at 21:00; Admin 


Dose 2.5 MG;  Start 1/18/19 at 21:00











EMERY RUSSELL                  Jan 19, 2019 10:44

## 2019-01-19 NOTE — NUR
EOSS: A/O x 4, VS stable, denies any pain, unable to tolerate room air and 1L of O2. Pt 
currently on 2L o2 via NC. Notified pt on O2 titration and goal saturation per MD. Home and 
portable O2 will be arranged by [place](see CM note for reference). All needs attended. 
Endorsed plan of care to oncoming shift accordingly.

## 2019-01-20 VITALS — HEART RATE: 118 BPM

## 2019-01-20 VITALS — DIASTOLIC BLOOD PRESSURE: 66 MMHG | SYSTOLIC BLOOD PRESSURE: 120 MMHG | RESPIRATION RATE: 18 BRPM | HEART RATE: 86 BPM

## 2019-01-20 VITALS — RESPIRATION RATE: 18 BRPM | SYSTOLIC BLOOD PRESSURE: 101 MMHG | DIASTOLIC BLOOD PRESSURE: 48 MMHG | HEART RATE: 132 BPM

## 2019-01-20 VITALS — SYSTOLIC BLOOD PRESSURE: 119 MMHG | DIASTOLIC BLOOD PRESSURE: 58 MMHG | HEART RATE: 58 BPM | RESPIRATION RATE: 18 BRPM

## 2019-01-20 VITALS — HEART RATE: 84 BPM

## 2019-01-20 VITALS — SYSTOLIC BLOOD PRESSURE: 127 MMHG | RESPIRATION RATE: 18 BRPM | HEART RATE: 53 BPM | DIASTOLIC BLOOD PRESSURE: 86 MMHG

## 2019-01-20 VITALS — SYSTOLIC BLOOD PRESSURE: 132 MMHG | DIASTOLIC BLOOD PRESSURE: 90 MMHG | RESPIRATION RATE: 18 BRPM | HEART RATE: 98 BPM

## 2019-01-20 VITALS — DIASTOLIC BLOOD PRESSURE: 99 MMHG | RESPIRATION RATE: 18 BRPM | SYSTOLIC BLOOD PRESSURE: 139 MMHG | HEART RATE: 69 BPM

## 2019-01-20 VITALS — HEART RATE: 153 BPM

## 2019-01-20 VITALS — HEART RATE: 128 BPM

## 2019-01-20 VITALS — HEART RATE: 125 BPM

## 2019-01-20 VITALS — HEART RATE: 81 BPM

## 2019-01-20 LAB
ADD MAN DIFF?: NO
ALLEN TEST: (no result)
ANION GAP: 9 (ref 5–13)
ARTERIAL BASE EXCESS: -2.7 MMOL/L (ref -3–3)
ARTERIAL BLOOD GAS OXYGEN SAT: 84.7 MMHG (ref 95–100)
ARTERIAL COHB: 0.3 % (ref 0–3)
ARTERIAL FRACTION OF OXYHGB: 84.2 % (ref 93–99)
ARTERIAL HCO3: 18.4 MMOL/L (ref 22–26)
ARTERIAL METHB: 0.3 % (ref 0–1.5)
ARTERIAL PCO2: 24.7 MMHG (ref 35–45)
BASOPHIL #: 0 10^3/UL (ref 0–0.1)
BASOPHILS %: 0.3 % (ref 0–2)
BLOOD UREA NITROGEN: 35 MG/DL (ref 7–20)
CALCIUM: 9.4 MG/DL (ref 8.4–10.2)
CARBON DIOXIDE: 29 MMOL/L (ref 21–31)
CHLORIDE: 99 MMOL/L (ref 97–110)
CREATININE: 1.12 MG/DL (ref 0.61–1.24)
EOSINOPHILS #: 0 10^3/UL (ref 0–0.5)
EOSINOPHILS %: 0 % (ref 0–7)
FIO2: 21 %
GLUCOSE: 95 MG/DL (ref 70–220)
HEMATOCRIT: 51.5 % (ref 42–52)
HEMOGLOBIN: 17.3 G/DL (ref 14–18)
IMMATURE GRANS #M: 0.11 10^3/UL (ref 0–0.03)
IMMATURE GRANS % (M): 1.7 % (ref 0–0.43)
LYMPHOCYTES #: 0.8 10^3/UL (ref 0.8–2.9)
LYMPHOCYTES %: 12.6 % (ref 15–51)
MAGNESIUM: 2.2 MG/DL (ref 1.7–2.5)
MEAN CORPUSCULAR HEMOGLOBIN: 31.5 PG (ref 29–33)
MEAN CORPUSCULAR HGB CONC: 33.6 G/DL (ref 32–37)
MEAN CORPUSCULAR VOLUME: 93.8 FL (ref 82–101)
MEAN PLATELET VOLUME: 12 FL (ref 7.4–10.4)
MODE: (no result)
MONOCYTE #: 0.4 10^3/UL (ref 0.3–0.9)
MONOCYTES %: 6.4 % (ref 0–11)
NEUTROPHIL #: 5.1 10^3/UL (ref 1.6–7.5)
NEUTROPHILS %: 79 % (ref 39–77)
NUCLEATED RED BLOOD CELLS #: 0 10^3/UL (ref 0–0)
NUCLEATED RED BLOOD CELLS%: 0 /100WBC (ref 0–0)
O2 A-A PPRESDIFF RESPIRATORY: 72.3 MMHG (ref 7–24)
PLATELET COUNT: 120 10^3/UL (ref 140–415)
POTASSIUM: 4.6 MMOL/L (ref 3.5–5.1)
RED BLOOD COUNT: 5.49 10^6/UL (ref 4.7–6.1)
RED CELL DISTRIBUTION WIDTH: 15.2 % (ref 11.5–14.5)
SODIUM: 137 MMOL/L (ref 135–144)
WHITE BLOOD COUNT: 6.4 10^3/UL (ref 4.8–10.8)

## 2019-01-20 RX ADMIN — LISINOPRIL 1 MG: 5 TABLET ORAL at 19:55

## 2019-01-20 RX ADMIN — APIXABAN SCH MG: 5 TABLET, FILM COATED ORAL at 19:54

## 2019-01-20 RX ADMIN — ASPIRIN SCH MG: 81 TABLET, COATED ORAL at 08:28

## 2019-01-20 RX ADMIN — FLUTICASONE FUROATE AND VILANTEROL TRIFENATATE 1 INH: 100; 25 POWDER RESPIRATORY (INHALATION) at 08:23

## 2019-01-20 RX ADMIN — NICOTINE 1 PATCH: 21 PATCH, EXTENDED RELEASE TRANSDERMAL at 08:26

## 2019-01-20 RX ADMIN — FUROSEMIDE SCH MG: 40 TABLET ORAL at 05:53

## 2019-01-20 RX ADMIN — FLUTICASONE FUROATE AND VILANTEROL TRIFENATATE SCH INH: 100; 25 POWDER RESPIRATORY (INHALATION) at 08:23

## 2019-01-20 RX ADMIN — APIXABAN 1 MG: 5 TABLET, FILM COATED ORAL at 19:54

## 2019-01-20 RX ADMIN — METOPROLOL SUCCINATE SCH MG: 50 TABLET, EXTENDED RELEASE ORAL at 19:55

## 2019-01-20 RX ADMIN — METOPROLOL TARTRATE PRN MG: 5 INJECTION, SOLUTION INTRAVENOUS at 22:28

## 2019-01-20 RX ADMIN — FUROSEMIDE 1 MG: 40 TABLET ORAL at 05:53

## 2019-01-20 RX ADMIN — LISINOPRIL SCH MG: 5 TABLET ORAL at 19:55

## 2019-01-20 RX ADMIN — DILTIAZEM HYDROCHLORIDE SCH MG: 30 TABLET, FILM COATED ORAL at 19:54

## 2019-01-20 RX ADMIN — TIOTROPIUM BROMIDE 1 INH: 18 CAPSULE ORAL; RESPIRATORY (INHALATION) at 08:26

## 2019-01-20 RX ADMIN — DILTIAZEM HYDROCHLORIDE 1 MG: 30 TABLET, FILM COATED ORAL at 19:54

## 2019-01-20 RX ADMIN — APIXABAN SCH MG: 5 TABLET, FILM COATED ORAL at 08:28

## 2019-01-20 RX ADMIN — METOPROLOL TARTRATE 1 MG: 5 INJECTION, SOLUTION INTRAVENOUS at 12:18

## 2019-01-20 RX ADMIN — METOPROLOL SUCCINATE 1 MG: 50 TABLET, EXTENDED RELEASE ORAL at 08:28

## 2019-01-20 RX ADMIN — NICOTINE SCH PATCH: 21 PATCH, EXTENDED RELEASE TRANSDERMAL at 08:26

## 2019-01-20 RX ADMIN — METOPROLOL TARTRATE PRN MG: 5 INJECTION, SOLUTION INTRAVENOUS at 12:18

## 2019-01-20 RX ADMIN — ASPIRIN 1 MG: 81 TABLET, COATED ORAL at 08:28

## 2019-01-20 RX ADMIN — DILTIAZEM HYDROCHLORIDE 1 MG: 30 TABLET, FILM COATED ORAL at 17:00

## 2019-01-20 RX ADMIN — METOPROLOL SUCCINATE 1 MG: 50 TABLET, EXTENDED RELEASE ORAL at 19:55

## 2019-01-20 RX ADMIN — DILTIAZEM HYDROCHLORIDE 1 MG: 30 TABLET, FILM COATED ORAL at 15:09

## 2019-01-20 RX ADMIN — METOPROLOL SUCCINATE SCH MG: 50 TABLET, EXTENDED RELEASE ORAL at 08:28

## 2019-01-20 RX ADMIN — METOPROLOL TARTRATE 1 MG: 5 INJECTION, SOLUTION INTRAVENOUS at 22:28

## 2019-01-20 RX ADMIN — DILTIAZEM HYDROCHLORIDE SCH MG: 30 TABLET, FILM COATED ORAL at 17:00

## 2019-01-20 RX ADMIN — APIXABAN 1 MG: 5 TABLET, FILM COATED ORAL at 08:28

## 2019-01-20 NOTE — PN
Date/Time of Note


Date/Time of Note


DATE: 1/20/19 


TIME: 11:48





Assessment/Plan


VTE Prophylaxis


Risk score (from Ns)>0 risk:  2


SCD applied (from Ns):  Yes


Pharmacological prophylaxis:  apixaban





Lines/Catheters


IV Catheter Type (from [facility]):  Saline Lock


Urinary Cath still in place:  No





Assessment/Plan


Assessment/Plan


73-year-old male with:





1.  Syncopal episode, likely related to arrhythmia and atrial fibrillation.  CT 


head negative.  No neurological deficit.


Cardiac workup ongoing, heart rate control, cardiology following.  Patient was 


noted to have episode of nonsustained V. tach over the past 48 hours.  


Stress test negative for acute ischemia 


No syncopal episode witnessed during this admission, heart rate controlled.  


Hemodynamically stable.





2. Atrial fibrillation, new onset, episodes of rapid ventricular rate.  TFTs 


within normal limits.  Heart rate better controlled, on beta-blockers now. 


Patient with episodes of nonsustained V. tach.


Appreciate recommendations from cardiology.  


2D echocardiogram with diastolic dysfunction and ejection fraction of 50%.


Hemodynamically stable.





3.  Elevated troponin, possibly demand ischemia, in setting of episodes of 


tachyarrhythmia, no previous diagnosis of coronary artery disease as far as we 


know.  Troponin trended back to normal and stress test negative for acute 


ischemia. 


Echocardiogram with ejection fraction of 50%.  


Continue ASA





4.  Hypertension: On beta-blockers, BP controlled so far.





5.  Renal insufficiency, acute kidney injury resolved.  Renal function much 


improved.  Tolerating Lasix daily. 





6.  Ascending aortic aneurysm, incidental finding, 4.7 cm, needs surveillance 


and monitoring, next CT angiogram due in 6 months to assess if stable size 


versus increasing size.  For now no further intervention, discussed with 


cardiology.





7.  Tobacco use, patient agreeable to quit, will order for nicotine patch.  


Likely COPD, plan to discharge him on Brio Ellipta and Spiriva. 





8.  T11 compression fracture, old, incidental finding.  Patient stable.  No 


signs of adenopathy seen or pulmonary masses seen.





9.  Hypoxemia, on admission patient did have mild volume overload which is now 


resolved, likely secondary to also COPD now on appropriate inhalers, today 


hypoxemia resolved, O2 sat 92-94% on room air.  Patient was decided yesterday to


85% therefore he is being discharged on 2 L nasal cannula as needed with 


exertion or while sleeping.





Prophylaxis: On Eliquis, patient tolerating p.o.


Disposition: Adequate O2 sats today, portable O2 already ordered for home.  


Patient discharged home today with home O2 and home health RN check.  He is to 


remain on the current cardiac medications.  Discharge planning already discussed


with cardiology, Dr. Reese, yesterday who agreed with plan on discharge today a


s long as blood pressure tolerating current medications and heart rate 


controlled from the cardiac standpoint.


Result Diagram:  


1/19/19 0558                                                                    


           1/19/19 0558








Subjective


24 Hr Interval Summary


Free Text/Dictation


Patient remains stable, hypoxemia seems to be resolved currently at least at 


rest, repeat chest x-ray clear with no atelectasis.  Patient satting 92-94% on 


room air at rest.  However with desaturation down to 85% yesterday, O2 has been 


already arranged and patient to be discharged home today





Exam/Review of Systems


Vital Signs


Vitals





Vital Signs


  Date      Temp  Pulse  Resp  B/P (MAP)   Pulse Ox  O2          O2 Flow    FiO2


Time                                                 Delivery    Rate


   1/20/19                                           Nasal             1.0


     09:08                                           Cannula


   1/20/19          118


     08:15


   1/20/19  98.0           18      132/90        98


     07:50                          (104)








Intake and Output





1/19/19 1/19/19 1/20/19





1515:00


23:00


07:00





IntakeIntake Total


450 ml


240 ml





OutputOutput Total


750 ml


700 ml





BalanceBalance


-300 ml


-460 ml














Exam


Constitutional:  alert, oriented, well developed


Respiratory:  clear to auscultation, normal air movement


Cardiovascular:  irregular rhythm


Gastrointestinal:  soft, non-tender


Musculoskeletal:  nl extremities to inspection


Extremities:  normal pulses, other (No edema, clubbing or cyanosis)


Neurological:  CNS II-XII intact, nl mental status, nl speech, nl strength (at 


baseline )





Medications


Medications





Current Medications


Metoprolol Tartrate (Lopressor) 5 mg Q6H  PRN IV TACHYARDIA Last administered on


1/19/19at 22:09; Admin Dose 5 MG;  Start 1/15/19 at 20:00


IV Flush (NS 3 ml) 3 ml PER PROTOCOL IV ;  Start 1/15/19 at 20:00


Ondansetron HCl (Zofran Inj) 4 mg Q6H  PRN IV NAUSEA AND/OR VOMITING;  Start 


1/15/19 at 20:00


Nitroglycerin (Nitroglycerin (Sl Tab) 0.4 Mg) 1 tab Q5M  PRN SL CHEST PAIN;  


Start 1/15/19 at 20:00


Acetaminophen (Tylenol Tab) 650 mg Q6H  PRN PO PAIN LEVEL 1-3 OR FEVER;  Start 


1/15/19 at 20:00


Morphine Sulfate (morphine) 2 mg Q4H  PRN IV PAIN LEVEL 7-10;  Start 1/15/19 at 


20:00


Docusate Sodium (Colace) 100 mg Q12H  PRN PO CONSTIPATION;  Start 1/15/19 at 


20:00


Magnesium Hydroxide (Milk Of Mag) 30 ml DAILY  PRN PO CONSTIPATION;  Start 


1/15/19 at 20:00


Bisacodyl (Dulcolax Supp) 10 mg DAILY  PRN [place] CONSTIPATION;  Start 1/15/19 at 


20:00


Aspirin (Halfprin) 81 mg DAILY PO  Last administered on 1/20/19at 08:28; Admin 


Dose 81 MG;  Start 1/17/19 at 09:00


Nicotine (Nicoderm 21 Mg/ 24hr) 1 patch DAILY TRANSDERM  Last administered on 


1/20/19at 08:26; Admin Dose 1 PATCH;  Start 1/16/19 at 13:00


Diltiazem HCl (Cardizem) 30 mg Q6  PRN PO sustained hr>130;  Start 1/16/19 at 


12:30


Apixaban (Eliquis) 5 mg BID PO  Last administered on 1/20/19at 08:28; Admin Dose


5 MG;  Start 1/16/19 at 21:00


Fluticasone/ Vilanterol (Breo Ellipta 100-25 Mcg Inh) 1 inh DAILY INH  Last 


administered on 1/20/19at 08:23; Admin Dose 1 INH;  Start 1/17/19 at 11:30


Tiotropium Bromide (Spiriva) 1 inh DAILY INH  Last administered on 1/20/19at 


08:26; Admin Dose 1 INH;  Start 1/17/19 at 11:30


Albuterol/ Ipratropium (Duoneb) 3 ml Q4H RESP THERAPY  PRN HHN SHORTNESS OF 


BREATH;  Start 1/17/19 at 15:00


Furosemide (Lasix) 40 mg DAILY@0600 PO  Last administered on 1/20/19at 05:53; 


Admin Dose 40 MG;  Start 1/19/19 at 06:00


Metoprolol Succinate (Toprol Xl) 50 mg BID PO  Last administered on 1/20/19at 


08:28; Admin Dose 50 MG;  Start 1/18/19 at 21:00


Lisinopril (Zestril) 2.5 mg QHS PO  Last administered on 1/19/19at 20:56; Admin 


Dose 2.5 MG;  Start 1/18/19 at 21:00





Imaging


Imaging


PROCEDURE:   XR Chest.


 


CLINICAL INDICATION:   Shortness of breath .


 


TECHNIQUE:   Single frontal chest x-ray. 


 


COMPARISON:    CHEST 1/17/2019


 


FINDINGS:


The lungs are clear of acute infiltrates, edema, effusions, or masses. Calcific 


atherosclerosis of the aorta is present..  Cardiomegaly is unchanged.. The o


sseous structures are intact.   


 


IMPRESSION:


No acute cardiopulmonary disease. 


Cardiomegaly with calcified aorta.  


 


RPTAT: GG


_____________________________________________ 


.Abe Stearns MD, MD           Date    Time 


Electronically viewed and signed by .Abe Stearns MD, MD on 01/20/2019 10:15 


 


D:  01/20/2019 10:15  T:  01/20/2019 10:15


.L/





CC: EMERY RUSSELL N'DEYE F                  Jan 20, 2019 11:58

## 2019-01-20 NOTE — PN
Date/Time of Note


Date/Time of Note


DATE: 1/20/19 


TIME: 16:56





Assessment/Plan


VTE Prophylaxis


Risk score (from Ns)>0 risk:  2


SCD applied (from Ns):  Yes


Pharmacological prophylaxis:  apixaban





Lines/Catheters


IV Catheter Type (from Nrsg):  Saline Lock


Urinary Cath still in place:  No





Assessment/Plan


Hospital Course


72 yo with hypertension and new onset atrial fibrillation, today with more rapid


rate.


Assessment/Plan


Impression:


Afib with rapid rate, newly diagnosed on this admission


Hypertension, controlled





Recommendations:


Check electrolytes and CBC


Add diltiazem 30 mg qid (four times a day) with hold parameters


Continue metoprolol, continue Eliquis


Hold discharge and observe


Case discussed with Dr. Schmitt


Result Diagram:  


1/19/19 0558 1/19/19 0558








Subjective


24 Hr Interval Summary


Free Text/Dictation


Patient feels well, mild shortness of breath, no pain, no palpitations.


Telemetry demonstrates afib at 130-140 bpm.





Exam/Review of Systems


Vital Signs


Vitals





Vital Signs


  Date      Temp  Pulse  Resp  B/P (MAP)   Pulse Ox  O2          O2 Flow    FiO2


Time                                                 Delivery    Rate


   1/20/19  98.0     69    18      139/99        98


     16:48                          (112)


   1/20/19                                           Nasal             1.0


     09:08                                           Cannula








Intake and Output





1/19/19 1/19/19 1/20/19





1515:00


23:00


07:00





IntakeIntake Total


450 ml


240 ml





OutputOutput Total


750 ml


700 ml





BalanceBalance


-300 ml


-460 ml














Exam


Constitutional:  alert, oriented, other (thin)


Psych:  no complaints, nl mood/affect


Head:  normocephalic, atraumatic


Eyes:  nl conjunctiva, EOMI, nl lids, nl sclera


ENMT:  nl external ears & nose, nl lips & teeth


Neck:  supple; 


   No jvd, No bruits


Respiratory:  clear to auscultation, normal air movement


Cardiovascular:  irregular rhythm; 


   No jugular venous distention (JVD), No murmurs/extra sounds


Gastrointestinal:  soft, nl liver, spleen, non-tender


Musculoskeletal:  nl extremities to inspection


Extremities:  No edema


Neurological:  nl mental status, nl speech


Skin:  nl turgor; 


   No rash or lesions





Medications


Medications





Current Medications


Metoprolol Tartrate (Lopressor) 5 mg Q6H  PRN IV TACHYARDIA Last administered on


1/20/19at 12:18; Admin Dose 5 MG;  Start 1/15/19 at 20:00


IV Flush (NS 3 ml) 3 ml PER PROTOCOL IV ;  Start 1/15/19 at 20:00


Ondansetron HCl (Zofran Inj) 4 mg Q6H  PRN IV NAUSEA AND/OR VOMITING;  Start 


1/15/19 at 20:00


Nitroglycerin (Nitroglycerin (Sl Tab) 0.4 Mg) 1 tab Q5M  PRN SL CHEST PAIN;  


Start 1/15/19 at 20:00


Acetaminophen (Tylenol Tab) 650 mg Q6H  PRN PO PAIN LEVEL 1-3 OR FEVER;  Start 


1/15/19 at 20:00


Morphine Sulfate (morphine) 2 mg Q4H  PRN IV PAIN LEVEL 7-10;  Start 1/15/19 at 


20:00


Docusate Sodium (Colace) 100 mg Q12H  PRN PO CONSTIPATION;  Start 1/15/19 at 


20:00


Magnesium Hydroxide (Milk Of Mag) 30 ml DAILY  PRN PO CONSTIPATION;  Start 


1/15/19 at 20:00


Bisacodyl (Dulcolax Supp) 10 mg DAILY  PRN [place] CONSTIPATION;  Start 1/15/19 at 


20:00


Aspirin (Halfprin) 81 mg DAILY PO  Last administered on 1/20/19at 08:28; Admin 


Dose 81 MG;  Start 1/17/19 at 09:00


Nicotine (Nicoderm 21 Mg/ 24hr) 1 patch DAILY TRANSDERM  Last administered on 


1/20/19at 08:26; Admin Dose 1 PATCH;  Start 1/16/19 at 13:00


Diltiazem HCl (Cardizem) 30 mg Q6  PRN PO sustained hr>130 Last administered on 


1/20/19at 15:09; Admin Dose 30 MG;  Start 1/16/19 at 12:30


Apixaban (Eliquis) 5 mg BID PO  Last administered on 1/20/19at 08:28; Admin Dose


5 MG;  Start 1/16/19 at 21:00


Fluticasone/ Vilanterol (Breo Ellipta 100-25 Mcg Inh) 1 inh DAILY INH  Last 


administered on 1/20/19at 08:23; Admin Dose 1 INH;  Start 1/17/19 at 11:30


Tiotropium Bromide (Spiriva) 1 inh DAILY INH  Last administered on 1/20/19at 


08:26; Admin Dose 1 INH;  Start 1/17/19 at 11:30


Albuterol/ Ipratropium (Duoneb) 3 ml Q4H RESP THERAPY  PRN HHN SHORTNESS OF 


BREATH;  Start 1/17/19 at 15:00


Metoprolol Succinate (Toprol Xl) 50 mg BID PO  Last administered on 1/20/19at 


08:28; Admin Dose 50 MG;  Start 1/18/19 at 21:00


Lisinopril (Zestril) 2.5 mg QHS PO  Last administered on 1/19/19at 20:56; Admin 


Dose 2.5 MG;  Start 1/18/19 at 21:00


Diltiazem HCl (Cardizem) 30 mg QID PO ;  Start 1/20/19 at 17:00;  Status UNV











LIZABETH PASTOR             Jan 20, 2019 16:59

## 2019-01-20 NOTE — NUR
RN NOTES

PT. HEART RATE WENT UP 'S. DR. RUSSELL IS AWARE AND SHE SAID TO GIVE THE PRN LOPRESSOR 
AND PT. CAN BE D/C HOME WHEN THE HEART RATE STABILIZE.

## 2019-01-20 NOTE — NUR
RN NOTES

DR. PASTOR CAME AND VISITED THE PT. SHE IS INFORMED REGARDING THE HEART RATE TREND OF THE 
PT. THAT IT REACHES 150'S. SHE ALSO SPOKED WITH DR. RUSSELL AND SHE SAID THAT DISCHARGE IS 
HOLD.

## 2019-01-20 NOTE — NUR
EOSS



Pt AOx4. VS stable. Afib rate controlled at 80s on monitor. PRN IV Metoprolol given for 
HR>110. O2 titrated to 1LPM on nasal cannula. No complaints of pain/SOB/. Fall 
precautions implemented. Hourly rounding done. Will endorse to AM shift.

## 2019-01-20 NOTE — NUR
EOSS

PT. IS ALERT AND ORIENTED ABLE TO MAKE NEEDS KNOWN, CALL LIGHT W/IN REACH. NO SOB OR 
DISTRESS NOTED. KEPT CLEAN AND DRY. WILL CONTINUE TO MONITOR.

## 2019-01-20 NOTE — NUR
RN NOTES

SPOKED WITH DR. RUSSELL AND INFORMED HER THAT PT. HEART RATE IS STILL "S. SHE SAID TO 
GIVE THE CARDIZEM 30MG PRN MED. GIVEN AS ORDERED.

## 2019-01-21 VITALS — SYSTOLIC BLOOD PRESSURE: 144 MMHG | HEART RATE: 61 BPM | RESPIRATION RATE: 19 BRPM | DIASTOLIC BLOOD PRESSURE: 87 MMHG

## 2019-01-21 VITALS — HEART RATE: 57 BPM | SYSTOLIC BLOOD PRESSURE: 113 MMHG | DIASTOLIC BLOOD PRESSURE: 80 MMHG | RESPIRATION RATE: 18 BRPM

## 2019-01-21 VITALS — HEART RATE: 114 BPM

## 2019-01-21 VITALS — HEART RATE: 121 BPM | SYSTOLIC BLOOD PRESSURE: 122 MMHG | DIASTOLIC BLOOD PRESSURE: 81 MMHG | RESPIRATION RATE: 18 BRPM

## 2019-01-21 VITALS — HEART RATE: 101 BPM

## 2019-01-21 VITALS — HEART RATE: 137 BPM

## 2019-01-21 VITALS — HEART RATE: 59 BPM | RESPIRATION RATE: 19 BRPM | DIASTOLIC BLOOD PRESSURE: 61 MMHG | SYSTOLIC BLOOD PRESSURE: 175 MMHG

## 2019-01-21 VITALS — HEART RATE: 73 BPM

## 2019-01-21 VITALS — DIASTOLIC BLOOD PRESSURE: 89 MMHG | RESPIRATION RATE: 18 BRPM | HEART RATE: 60 BPM | SYSTOLIC BLOOD PRESSURE: 137 MMHG

## 2019-01-21 VITALS — SYSTOLIC BLOOD PRESSURE: 145 MMHG | RESPIRATION RATE: 18 BRPM | DIASTOLIC BLOOD PRESSURE: 67 MMHG | HEART RATE: 113 BPM

## 2019-01-21 VITALS — HEART RATE: 120 BPM

## 2019-01-21 VITALS — HEART RATE: 106 BPM

## 2019-01-21 LAB
ANION GAP: 7 (ref 5–13)
BLOOD UREA NITROGEN: 40 MG/DL (ref 7–20)
CALCIUM: 9.3 MG/DL (ref 8.4–10.2)
CARBON DIOXIDE: 28 MMOL/L (ref 21–31)
CHLORIDE: 101 MMOL/L (ref 97–110)
CREATININE: 1.19 MG/DL (ref 0.61–1.24)
GLUCOSE: 94 MG/DL (ref 70–220)
MAGNESIUM: 2.2 MG/DL (ref 1.7–2.5)
POTASSIUM: 5.5 MMOL/L (ref 3.5–5.1)
SODIUM: 136 MMOL/L (ref 135–144)

## 2019-01-21 RX ADMIN — FLUTICASONE FUROATE AND VILANTEROL TRIFENATATE SCH INH: 100; 25 POWDER RESPIRATORY (INHALATION) at 09:23

## 2019-01-21 RX ADMIN — METOPROLOL SUCCINATE SCH MG: 100 TABLET, FILM COATED, EXTENDED RELEASE ORAL at 21:00

## 2019-01-21 RX ADMIN — APIXABAN SCH MG: 5 TABLET, FILM COATED ORAL at 09:22

## 2019-01-21 RX ADMIN — DILTIAZEM HYDROCHLORIDE SCH MG: 30 TABLET, FILM COATED ORAL at 09:22

## 2019-01-21 RX ADMIN — NICOTINE 1 PATCH: 21 PATCH, EXTENDED RELEASE TRANSDERMAL at 09:17

## 2019-01-21 RX ADMIN — NICOTINE SCH PATCH: 21 PATCH, EXTENDED RELEASE TRANSDERMAL at 09:17

## 2019-01-21 RX ADMIN — APIXABAN SCH MG: 5 TABLET, FILM COATED ORAL at 21:09

## 2019-01-21 RX ADMIN — ASPIRIN SCH MG: 81 TABLET, COATED ORAL at 09:22

## 2019-01-21 RX ADMIN — APIXABAN 1 MG: 5 TABLET, FILM COATED ORAL at 09:22

## 2019-01-21 RX ADMIN — METOPROLOL SUCCINATE 1 MG: 50 TABLET, EXTENDED RELEASE ORAL at 09:22

## 2019-01-21 RX ADMIN — FLUTICASONE FUROATE AND VILANTEROL TRIFENATATE 1 INH: 100; 25 POWDER RESPIRATORY (INHALATION) at 09:23

## 2019-01-21 RX ADMIN — APIXABAN 1 MG: 5 TABLET, FILM COATED ORAL at 21:09

## 2019-01-21 RX ADMIN — DILTIAZEM HYDROCHLORIDE SCH MG: 30 TABLET, FILM COATED ORAL at 14:48

## 2019-01-21 RX ADMIN — DILTIAZEM HYDROCHLORIDE 1 MG: 180 CAPSULE, COATED, EXTENDED RELEASE ORAL at 11:03

## 2019-01-21 RX ADMIN — ASPIRIN 1 MG: 81 TABLET, COATED ORAL at 09:22

## 2019-01-21 RX ADMIN — DILTIAZEM HYDROCHLORIDE 1 MG: 30 TABLET, FILM COATED ORAL at 17:42

## 2019-01-21 RX ADMIN — DILTIAZEM HYDROCHLORIDE SCH MG: 30 TABLET, FILM COATED ORAL at 17:42

## 2019-01-21 RX ADMIN — TIOTROPIUM BROMIDE 1 INH: 18 CAPSULE ORAL; RESPIRATORY (INHALATION) at 09:22

## 2019-01-21 RX ADMIN — DILTIAZEM HYDROCHLORIDE 1 MG: 30 TABLET, FILM COATED ORAL at 09:22

## 2019-01-21 RX ADMIN — METOPROLOL TARTRATE 1 MG: 50 TABLET, FILM COATED ORAL at 14:49

## 2019-01-21 RX ADMIN — DILTIAZEM HYDROCHLORIDE 1 MG: 30 TABLET, FILM COATED ORAL at 14:48

## 2019-01-21 RX ADMIN — METOPROLOL SUCCINATE 1 MG: 100 TABLET, EXTENDED RELEASE ORAL at 21:00

## 2019-01-21 RX ADMIN — METOPROLOL SUCCINATE SCH MG: 50 TABLET, EXTENDED RELEASE ORAL at 09:22

## 2019-01-21 NOTE — NUR
Patient's heart rate in the 120's or higher. MD notified, one time dose of cardizem 180 
given. Heart rate stayed in 120's, MD notified, one time dose of metoprolol given with daily 
dose raised to 100mg effective 2100. Patient continued to have tachycardia. Patient seen by 
consulting cardiologist, Dr. Reese, meds adjusted. Primary MD, Dr. Jovel notified, 
discharge cancelled, patient made aware. Will continue to monitor.

## 2019-01-21 NOTE — CONS
Date/Time of Note


Date/Time of Note


DATE: 1/21/19 


TIME: 15:19





Assessment/Plan


Newly diagnosed atrial fibrillation


Acute decompensated systolic congestive heart failure


Renal dysfunction


Hypertension


Cardiomyopathy with ejection fraction 50%


Mitral and tricuspid valve regurgitation


NSVT


No ischemia nuclear cardiac perfusion study 1/18/2019








-Patient still with difficult to control atrial fibrillation.  Has been started 


on Cardizem, would increase dose of beta-blocker.


-Given elevated potassium, would DC ACE inhibitor


-Continue anticoagulation no contraindication


-Could consider addition of digoxin heart rate not improved.


Result Diagram:  


1/20/19 1711                                                                    


           1/21/19 0552





Results 24hrs





Laboratory Tests


Test
              1/20/19
17:08  1/20/19
17:11     1/20/19
22:21  1/21/19
05:52


Magnesium Level            2.2                                             2.2


White Blood Count                        6.4  #


Red Blood Count                          5.49


Hemoglobin                               17.3


Hematocrit                               51.5


Mean Corpuscular                         93.8


Volume


Mean Corpuscular                         31.5


Hemoglobin


Mean Corpuscular   
                    33.6  
  
                 



Hemoglobin
Concen


t


Red Cell                                15.2  H


Distribution


Width


Platelet Count                           120  L


Mean Platelet                           12.0  H


Volume


Immature                               1.700  H


Granulocytes %


Neutrophils %                           79.0  H


Lymphocytes %                           12.6  L


Monocytes %                               6.4


Eosinophils %                             0.0


Basophils %                               0.3


Nucleated Red                             0.0


Blood Cells %


Immature                               0.110  H


Granulocytes #


Neutrophils #                             5.1


Lymphocytes #                             0.8


Monocytes #                               0.4


Eosinophils #                             0.0


Basophils #                               0.0


Nucleated Red                             0.0


Blood Cells #


Sodium Level                              137                              136


Potassium Level                           4.6                             5.5  H


Chloride Level                             99                              101


Carbon Dioxide                             29                               28


Level


Anion Gap                                   9                                7


Blood Urea                                35  H                            40  H


Nitrogen


Creatinine                               1.12                             1.19


Est Glomerular     
                
            
                   



Filtrat


Rate
mL/min


Glucose Level                              95                               94


Calcium Level                             9.4                              9.3


Blood Gas          
              
              Blood arterial
   



Specimen Source



Arterial Blood     
              
              1/20/2019
10:50:  



Date Drawn
                                                 36 PM


Arterial Blood pH  
              
                     7.490  H
  



(Temp
corrected)


Arterial Blood     
              
                      24.7  L
  



pCO2


(Temp
correct)


Arterial Blood     
              
                     47.9  *L
  



pO2


(Temp
corrected)


Arterial Blood                                            18.4  L


HCO3


Arterial Blood                                             -2.7


Base Excess


Arterial Blood     
              
                      84.7  L
  



Oxygen
Saturation


Alex Test                                       ACCEPTAB


Arterial Blood     
              
              Right Radial  
   



Gas Puncture
Site


Arterial           
              
                        0.3  
  



Blood
Carboxyhemo


globin


Arterial Blood                                              0.3


Methemoglobin


Blood Gas A-a O2                                          72.3  H


Differential


Oxyhemoglobin                                             84.2  L


Percent


Blood Gas                                                  37.0


Temperature


Blood Gas Actual   
              
                         20  
  



Respiration
Rate


Blood Gas                                        ROOM AIR


Modality


FiO2                                                       21.0


Blood Gas          
              
              Ephraim Willams RN   



Critical Value


Read
Back


Blood Gas                                        JLENY


Notified Whom


Blood Gas          
              
              1/20/2019
11:11:  



Notified Time
                                              03 PM








Consultation Date/Type/Reason


Admit Date/Time


Dayo 15, 2019 at 18:59


Initial Consult Date





Type of Consult


cv





24 HR Interval Summary


Free Text/Dictation


Denies palpitations, shortness of breath is less with ambulation.  Denies chest 


pain or dizziness





Exam/Review of Systems


Vital Signs


Vitals





Vital Signs


  Date      Temp  Pulse  Resp  B/P (MAP)   Pulse Ox  O2          O2 Flow    FiO2


Time                                                 Delivery    Rate


   1/21/19          137


     12:02


   1/21/19  98.4           18      145/67        97


     11:01                           (93)


   1/21/19                                                             2.0    27


     03:13


   1/20/19                                           Nasal


     09:08                                           Cannula








Intake and Output





1/20/19 1/20/19 1/21/19





1515:00


23:00


07:00





IntakeIntake Total


700 ml


240 ml





OutputOutput Total


1000 ml


300 ml





BalanceBalance


-300 ml


-60 ml














Exam


nad


Constitutional:  alert, oriented


Respiratory:  other (Coarse breath sounds bilaterally, no wheezing)


Cardiovascular:  irregular rhythm (S1-S2 heard)


Gastrointestinal:  soft, non-tender, bowel sounds


Extremities:  edema (Trace)





Medications


Medications





Current Medications


Metoprolol Tartrate (Lopressor) 5 mg Q6H  PRN IV TACHYARDIA Last administered on


1/20/19at 22:28; Admin Dose 5 MG;  Start 1/15/19 at 20:00


IV Flush (NS 3 ml) 3 ml PER PROTOCOL IV ;  Start 1/15/19 at 20:00


Ondansetron HCl (Zofran Inj) 4 mg Q6H  PRN IV NAUSEA AND/OR VOMITING;  Start 


1/15/19 at 20:00


Nitroglycerin (Nitroglycerin (Sl Tab) 0.4 Mg) 1 tab Q5M  PRN SL CHEST PAIN;  


Start 1/15/19 at 20:00


Acetaminophen (Tylenol Tab) 650 mg Q6H  PRN PO PAIN LEVEL 1-3 OR FEVER;  Start 


1/15/19 at 20:00


Morphine Sulfate (morphine) 2 mg Q4H  PRN IV PAIN LEVEL 7-10;  Start 1/15/19 at 


20:00


Docusate Sodium (Colace) 100 mg Q12H  PRN PO CONSTIPATION;  Start 1/15/19 at 


20:00


Magnesium Hydroxide (Milk Of Mag) 30 ml DAILY  PRN PO CONSTIPATION;  Start 


1/15/19 at 20:00


Bisacodyl (Dulcolax Supp) 10 mg DAILY  PRN [place] CONSTIPATION;  Start 1/15/19 at 


20:00


Nicotine (Nicoderm 21 Mg/ 24hr) 1 patch DAILY TRANSDERM  Last administered on 


1/21/19at 09:17; Admin Dose 1 PATCH;  Start 1/16/19 at 13:00


Apixaban (Eliquis) 5 mg BID PO  Last administered on 1/21/19at 09:22; Admin Dose


5 MG;  Start 1/16/19 at 21:00


Fluticasone/ Vilanterol (Breo Ellipta 100-25 Mcg Inh) 1 inh DAILY INH  Last 


administered on 1/21/19at 09:23; Admin Dose 1 INH;  Start 1/17/19 at 11:30


Tiotropium Bromide (Spiriva) 1 inh DAILY INH  Last administered on 1/21/19at 


09:22; Admin Dose 1 INH;  Start 1/17/19 at 11:30


Albuterol/ Ipratropium (Duoneb) 3 ml Q4H RESP THERAPY  PRN HHN SHORTNESS OF 


BREATH;  Start 1/17/19 at 15:00


Lisinopril (Zestril) 2.5 mg QHS PO  Last administered on 1/20/19at 19:55; Admin 


Dose 2.5 MG;  Start 1/18/19 at 21:00


Diltiazem HCl (Cardizem Cd) 180 mg DAILY PO  Last administered on 1/21/19at 


11:03; Admin Dose 180 MG;  Start 1/21/19 at 10:00


Metoprolol Succinate (Toprol Xl) 200 mg BID PO ;  Start 1/21/19 at 21:00











Austin Reese DO           Jan 21, 2019 15:22

## 2019-01-21 NOTE — NUR
EOSS

Pt is alert but confused. Unable to determine where he is and the date. AOx1. Dr. Schmitt 
notified and aware of pt's cognition. VS stable. HR is still irregular ranging from . 
Afib on monitor. PRN metoprolol given for HR >110. Pt placed back on 2L O2 nasal cannula due 
to low pO2 result on ABG as per Dr. Schmitt's order. No complaints of pain. Fall precautions 
implemented. Hourly rounding done. Needs attended. Will endorse to AM shift.

## 2019-01-22 VITALS — HEART RATE: 72 BPM | SYSTOLIC BLOOD PRESSURE: 107 MMHG | DIASTOLIC BLOOD PRESSURE: 78 MMHG | RESPIRATION RATE: 20 BRPM

## 2019-01-22 VITALS — HEART RATE: 58 BPM | RESPIRATION RATE: 18 BRPM | DIASTOLIC BLOOD PRESSURE: 82 MMHG | SYSTOLIC BLOOD PRESSURE: 123 MMHG

## 2019-01-22 VITALS — DIASTOLIC BLOOD PRESSURE: 84 MMHG | HEART RATE: 72 BPM | SYSTOLIC BLOOD PRESSURE: 121 MMHG | RESPIRATION RATE: 18 BRPM

## 2019-01-22 VITALS — HEART RATE: 72 BPM

## 2019-01-22 VITALS — DIASTOLIC BLOOD PRESSURE: 70 MMHG | HEART RATE: 71 BPM | RESPIRATION RATE: 18 BRPM | SYSTOLIC BLOOD PRESSURE: 130 MMHG

## 2019-01-22 VITALS — HEART RATE: 68 BPM

## 2019-01-22 LAB
ADD MAN DIFF?: NO
ANION GAP: 10 (ref 5–13)
BASOPHIL #: 0 10^3/UL (ref 0–0.1)
BASOPHILS %: 0.4 % (ref 0–2)
BLOOD UREA NITROGEN: 48 MG/DL (ref 7–20)
CALCIUM: 9 MG/DL (ref 8.4–10.2)
CARBON DIOXIDE: 27 MMOL/L (ref 21–31)
CHLORIDE: 100 MMOL/L (ref 97–110)
CREATININE: 1.27 MG/DL (ref 0.61–1.24)
EOSINOPHILS #: 0 10^3/UL (ref 0–0.5)
EOSINOPHILS %: 0.1 % (ref 0–7)
GLUCOSE: 93 MG/DL (ref 70–220)
HEMATOCRIT: 48.8 % (ref 42–52)
HEMOGLOBIN: 16.2 G/DL (ref 14–18)
IMMATURE GRANS #M: 0.12 10^3/UL (ref 0–0.03)
IMMATURE GRANS % (M): 1.5 % (ref 0–0.43)
LYMPHOCYTES #: 1.4 10^3/UL (ref 0.8–2.9)
LYMPHOCYTES %: 17.6 % (ref 15–51)
MAGNESIUM: 2.3 MG/DL (ref 1.7–2.5)
MEAN CORPUSCULAR HEMOGLOBIN: 31.2 PG (ref 29–33)
MEAN CORPUSCULAR HGB CONC: 33.2 G/DL (ref 32–37)
MEAN CORPUSCULAR VOLUME: 93.8 FL (ref 82–101)
MEAN PLATELET VOLUME: 12.3 FL (ref 7.4–10.4)
MONOCYTE #: 0.6 10^3/UL (ref 0.3–0.9)
MONOCYTES %: 7.6 % (ref 0–11)
NEUTROPHIL #: 5.7 10^3/UL (ref 1.6–7.5)
NEUTROPHILS %: 72.8 % (ref 39–77)
NUCLEATED RED BLOOD CELLS #: 0 10^3/UL (ref 0–0)
NUCLEATED RED BLOOD CELLS%: 0.4 /100WBC (ref 0–0)
PLATELET COUNT: 135 10^3/UL (ref 140–415)
POTASSIUM: 4.8 MMOL/L (ref 3.5–5.1)
RED BLOOD COUNT: 5.2 10^6/UL (ref 4.7–6.1)
RED CELL DISTRIBUTION WIDTH: 15.6 % (ref 11.5–14.5)
SODIUM: 137 MMOL/L (ref 135–144)
WHITE BLOOD COUNT: 7.8 10^3/UL (ref 4.8–10.8)

## 2019-01-22 RX ADMIN — TIOTROPIUM BROMIDE 1 INH: 18 CAPSULE ORAL; RESPIRATORY (INHALATION) at 10:02

## 2019-01-22 RX ADMIN — FLUTICASONE FUROATE AND VILANTEROL TRIFENATATE 1 INH: 100; 25 POWDER RESPIRATORY (INHALATION) at 09:00

## 2019-01-22 RX ADMIN — FLUTICASONE FUROATE AND VILANTEROL TRIFENATATE SCH INH: 100; 25 POWDER RESPIRATORY (INHALATION) at 09:00

## 2019-01-22 RX ADMIN — DILTIAZEM HYDROCHLORIDE SCH MG: 30 TABLET, FILM COATED ORAL at 10:01

## 2019-01-22 RX ADMIN — DILTIAZEM HYDROCHLORIDE SCH MG: 30 TABLET, FILM COATED ORAL at 00:11

## 2019-01-22 RX ADMIN — DILTIAZEM HYDROCHLORIDE 1 MG: 30 TABLET, FILM COATED ORAL at 05:35

## 2019-01-22 RX ADMIN — NICOTINE 1 PATCH: 21 PATCH, EXTENDED RELEASE TRANSDERMAL at 10:02

## 2019-01-22 RX ADMIN — APIXABAN 1 MG: 5 TABLET, FILM COATED ORAL at 10:01

## 2019-01-22 RX ADMIN — APIXABAN SCH MG: 5 TABLET, FILM COATED ORAL at 10:01

## 2019-01-22 RX ADMIN — METOPROLOL SUCCINATE 1 MG: 100 TABLET, EXTENDED RELEASE ORAL at 10:02

## 2019-01-22 RX ADMIN — METOPROLOL SUCCINATE SCH MG: 100 TABLET, FILM COATED, EXTENDED RELEASE ORAL at 10:02

## 2019-01-22 RX ADMIN — DILTIAZEM HYDROCHLORIDE SCH MG: 30 TABLET, FILM COATED ORAL at 05:35

## 2019-01-22 RX ADMIN — NICOTINE SCH PATCH: 21 PATCH, EXTENDED RELEASE TRANSDERMAL at 10:02

## 2019-01-22 RX ADMIN — DILTIAZEM HYDROCHLORIDE 1 MG: 30 TABLET, FILM COATED ORAL at 10:01

## 2019-01-22 RX ADMIN — DILTIAZEM HYDROCHLORIDE 1 MG: 30 TABLET, FILM COATED ORAL at 00:11

## 2019-01-22 NOTE — PN
DATE:  01/21/2019

 

 

SUBJECTIVE:  The patient reports doing well.  No chest pain, no palpitations.

 

PHYSICAL EXAMINATION:

VITAL SIGNS:  Stable except heart rate of 109.

NECK:  Supple.

LUNGS:  Mild rhonchi.

CARDIAC:  Irregularly irregular rhythm with rapid rate.

ABDOMEN:  Soft, nontender, nondistended.  Normoactive bowel sounds.

EXTREMITIES:  No clubbing, cyanosis or edema.

NEUROLOGICAL:  Nonfocal.

 

ASSESSMENT:

1.  Atrial fibrillation with rapid ventricular rate.

2.  Syncopal episodes, most likely due to atrial fibrillation.

3.  Hypertension.

4.  Acute kidney injury.

5.  Ascending aorta aneurysm of incidental finding.

6.  Chronic smoker.

7.  T11 compression fracture.

 

PLAN:

1.  Continue metoprolol.

2.  Continue Lasix.

3.  Start Cardizem- mg daily.

4.  Discharge planning if rate is well controlled.

5.  Cardiology and pulmonary followup.

 

 

Dictated By: LAURIE LEVINE/ELADIO

DD:    01/21/2019 09:48:31

DT:    01/21/2019 12:40:31

Conf#: [id number]

DID#:  [id number]

CC: EMERY RUSSELL MD; DAVION LEES DO;*EndCC*

## 2019-01-22 NOTE — NUR
DC Notes

patient awake in bed, aox4 able to make needs known, all due medications given, needs 
attended. dc packet and prescriptions were given and discussed. IV access removed. pt stable 
upon dc. belongings given back to the pt.

## 2019-01-22 NOTE — NUR
END OF SHIFT REPORT



CARDIAC MONITORING STILL AFIB, NOW RATE CONTROLLED. AVERAGE 70-90. LOWEST AT 57/MIN.

HAD ONE EPISODE OF 5 BEATS UNSUSTAINED VTACH.



METOPROLOL NOT GIVEN LAST NIGHT (HR@57); CARDIZEM 30 MG NOT GIVEN THIS MORNING (). O2 
SAT DIPPED TO 83% ON 2 L. BUMPED UP TO 4L (SATURATING NOW AT 96%)



NO COMPLAINT.

## 2019-01-23 ENCOUNTER — HOSPITAL ENCOUNTER (INPATIENT)
Dept: HOSPITAL 91 - ICU | Age: 74
LOS: 4 days | DRG: 682 | End: 2019-01-27
Payer: COMMERCIAL

## 2019-01-23 ENCOUNTER — HOSPITAL ENCOUNTER (INPATIENT)
Dept: HOSPITAL 10 - E/R | Age: 74
LOS: 4 days | DRG: 682 | End: 2019-01-27
Attending: PEDIATRICS | Admitting: PEDIATRICS
Payer: COMMERCIAL

## 2019-01-23 VITALS
WEIGHT: 132.28 LBS | BODY MASS INDEX: 20.05 KG/M2 | HEIGHT: 68 IN | BODY MASS INDEX: 20.05 KG/M2 | HEIGHT: 68 IN | WEIGHT: 132.28 LBS

## 2019-01-23 VITALS — DIASTOLIC BLOOD PRESSURE: 87 MMHG | SYSTOLIC BLOOD PRESSURE: 133 MMHG | RESPIRATION RATE: 18 BRPM | HEART RATE: 119 BPM

## 2019-01-23 DIAGNOSIS — E11.22: ICD-10-CM

## 2019-01-23 DIAGNOSIS — Z79.01: ICD-10-CM

## 2019-01-23 DIAGNOSIS — E07.81: ICD-10-CM

## 2019-01-23 DIAGNOSIS — E16.2: ICD-10-CM

## 2019-01-23 DIAGNOSIS — I46.9: ICD-10-CM

## 2019-01-23 DIAGNOSIS — I25.10: ICD-10-CM

## 2019-01-23 DIAGNOSIS — I48.2: ICD-10-CM

## 2019-01-23 DIAGNOSIS — E11.10: ICD-10-CM

## 2019-01-23 DIAGNOSIS — E87.5: ICD-10-CM

## 2019-01-23 DIAGNOSIS — R62.7: ICD-10-CM

## 2019-01-23 DIAGNOSIS — F17.210: ICD-10-CM

## 2019-01-23 DIAGNOSIS — Z79.4: ICD-10-CM

## 2019-01-23 DIAGNOSIS — J44.9: ICD-10-CM

## 2019-01-23 DIAGNOSIS — E87.2: ICD-10-CM

## 2019-01-23 DIAGNOSIS — G93.40: ICD-10-CM

## 2019-01-23 DIAGNOSIS — N18.9: ICD-10-CM

## 2019-01-23 DIAGNOSIS — N17.9: Primary | ICD-10-CM

## 2019-01-23 DIAGNOSIS — I12.9: ICD-10-CM

## 2019-01-23 DIAGNOSIS — E86.0: ICD-10-CM

## 2019-01-23 DIAGNOSIS — Z79.82: ICD-10-CM

## 2019-01-23 LAB
ADD MAN DIFF?: NO
ANION GAP: 18 (ref 5–13)
ANION GAP: 20 (ref 5–13)
BASOPHIL #: 0 10^3/UL (ref 0–0.1)
BASOPHILS %: 0.3 % (ref 0–2)
BLOOD UREA NITROGEN: 71 MG/DL (ref 7–20)
BLOOD UREA NITROGEN: 75 MG/DL (ref 7–20)
CALCIUM: 9.1 MG/DL (ref 8.4–10.2)
CALCIUM: 9.2 MG/DL (ref 8.4–10.2)
CARBON DIOXIDE: 18 MMOL/L (ref 21–31)
CARBON DIOXIDE: 25 MMOL/L (ref 21–31)
CHLORIDE: 102 MMOL/L (ref 97–110)
CHLORIDE: 102 MMOL/L (ref 97–110)
CREATININE: 1.83 MG/DL (ref 0.61–1.24)
CREATININE: 2.23 MG/DL (ref 0.61–1.24)
EOSINOPHILS #: 0 10^3/UL (ref 0–0.5)
EOSINOPHILS %: 0 % (ref 0–7)
GLUCOSE: 104 MG/DL (ref 70–220)
GLUCOSE: 180 MG/DL (ref 70–220)
HEMATOCRIT: 48.4 % (ref 42–52)
HEMOGLOBIN: 15.5 G/DL (ref 14–18)
IMMATURE GRANS #M: 0.22 10^3/UL (ref 0–0.03)
IMMATURE GRANS % (M): 2.5 % (ref 0–0.43)
INR: 2.94
LYMPHOCYTES #: 1.2 10^3/UL (ref 0.8–2.9)
LYMPHOCYTES %: 13.5 % (ref 15–51)
MEAN CORPUSCULAR HEMOGLOBIN: 31.3 PG (ref 29–33)
MEAN CORPUSCULAR HGB CONC: 32 G/DL (ref 32–37)
MEAN CORPUSCULAR VOLUME: 97.6 FL (ref 82–101)
MEAN PLATELET VOLUME: 11.8 FL (ref 7.4–10.4)
MONOCYTE #: 0.4 10^3/UL (ref 0.3–0.9)
MONOCYTES %: 4.9 % (ref 0–11)
NEUTROPHIL #: 7.1 10^3/UL (ref 1.6–7.5)
NEUTROPHILS %: 78.8 % (ref 39–77)
NUCLEATED RED BLOOD CELLS #: 0.2 10^3/UL (ref 0–0)
NUCLEATED RED BLOOD CELLS%: 1.9 /100WBC (ref 0–0)
PLATELET COUNT: 142 10^3/UL (ref 140–415)
POTASSIUM: 5.3 MMOL/L (ref 3.5–5.1)
POTASSIUM: 6.2 MMOL/L (ref 3.5–5.1)
PROTIME: 30.7 SEC (ref 11.9–14.9)
PT RATIO: 2.4
RED BLOOD COUNT: 4.96 10^6/UL (ref 4.7–6.1)
RED CELL DISTRIBUTION WIDTH: 16.4 % (ref 11.5–14.5)
SODIUM: 140 MMOL/L (ref 135–144)
SODIUM: 145 MMOL/L (ref 135–144)
TROPONIN-I: 0.1 NG/ML (ref 0–0.12)
WHITE BLOOD COUNT: 9 10^3/UL (ref 4.8–10.8)

## 2019-01-23 PROCEDURE — 99217: CPT

## 2019-01-23 PROCEDURE — 84154 ASSAY OF PSA FREE: CPT

## 2019-01-23 PROCEDURE — 85014 HEMATOCRIT: CPT

## 2019-01-23 PROCEDURE — 83735 ASSAY OF MAGNESIUM: CPT

## 2019-01-23 PROCEDURE — 82962 GLUCOSE BLOOD TEST: CPT

## 2019-01-23 PROCEDURE — 76937 US GUIDE VASCULAR ACCESS: CPT

## 2019-01-23 PROCEDURE — 36600 WITHDRAWAL OF ARTERIAL BLOOD: CPT

## 2019-01-23 PROCEDURE — 84484 ASSAY OF TROPONIN QUANT: CPT

## 2019-01-23 PROCEDURE — G0378 HOSPITAL OBSERVATION PER HR: HCPCS

## 2019-01-23 PROCEDURE — 93880 EXTRACRANIAL BILAT STUDY: CPT

## 2019-01-23 PROCEDURE — 31500 INSERT EMERGENCY AIRWAY: CPT

## 2019-01-23 PROCEDURE — 85730 THROMBOPLASTIN TIME PARTIAL: CPT

## 2019-01-23 PROCEDURE — 93005 ELECTROCARDIOGRAM TRACING: CPT

## 2019-01-23 PROCEDURE — 96374 THER/PROPH/DIAG INJ IV PUSH: CPT

## 2019-01-23 PROCEDURE — 83036 HEMOGLOBIN GLYCOSYLATED A1C: CPT

## 2019-01-23 PROCEDURE — 85610 PROTHROMBIN TIME: CPT

## 2019-01-23 PROCEDURE — 82803 BLOOD GASES ANY COMBINATION: CPT

## 2019-01-23 PROCEDURE — 87081 CULTURE SCREEN ONLY: CPT

## 2019-01-23 PROCEDURE — 84153 ASSAY OF PSA TOTAL: CPT

## 2019-01-23 PROCEDURE — 71045 X-RAY EXAM CHEST 1 VIEW: CPT

## 2019-01-23 PROCEDURE — 84443 ASSAY THYROID STIM HORMONE: CPT

## 2019-01-23 PROCEDURE — 82533 TOTAL CORTISOL: CPT

## 2019-01-23 PROCEDURE — 94002 VENT MGMT INPAT INIT DAY: CPT

## 2019-01-23 PROCEDURE — 83525 ASSAY OF INSULIN: CPT

## 2019-01-23 PROCEDURE — 36569 INSJ PICC 5 YR+ W/O IMAGING: CPT

## 2019-01-23 PROCEDURE — 92950 HEART/LUNG RESUSCITATION CPR: CPT

## 2019-01-23 PROCEDURE — 87086 URINE CULTURE/COLONY COUNT: CPT

## 2019-01-23 PROCEDURE — 85025 COMPLETE CBC W/AUTO DIFF WBC: CPT

## 2019-01-23 PROCEDURE — 80048 BASIC METABOLIC PNL TOTAL CA: CPT

## 2019-01-23 PROCEDURE — 99285 EMERGENCY DEPT VISIT HI MDM: CPT

## 2019-01-23 PROCEDURE — 85018 HEMOGLOBIN: CPT

## 2019-01-23 RX ADMIN — POTASSIUM CHLORIDE SCH MLS/HR: 2 INJECTION, SOLUTION, CONCENTRATE INTRAVENOUS at 23:02

## 2019-01-23 RX ADMIN — METOPROLOL SUCCINATE SCH MG: 100 TABLET, FILM COATED, EXTENDED RELEASE ORAL at 23:03

## 2019-01-23 RX ADMIN — LIDOCAINE HYDROCHLORIDE 1 MLS/HR: 10 INJECTION, SOLUTION EPIDURAL; INFILTRATION; INTRACAUDAL; PERINEURAL at 13:54

## 2019-01-23 RX ADMIN — DEXTROSE MONOHYDRATE 1 ML: 500 INJECTION PARENTERAL at 14:24

## 2019-01-23 RX ADMIN — APIXABAN 1 MG: 5 TABLET, FILM COATED ORAL at 23:02

## 2019-01-23 RX ADMIN — FAMOTIDINE SCH MG: 20 TABLET ORAL at 23:02

## 2019-01-23 RX ADMIN — METOPROLOL SUCCINATE 1 MG: 100 TABLET, EXTENDED RELEASE ORAL at 23:03

## 2019-01-23 RX ADMIN — FAMOTIDINE 1 MG: 20 TABLET ORAL at 23:02

## 2019-01-23 RX ADMIN — APIXABAN SCH MG: 5 TABLET, FILM COATED ORAL at 23:02

## 2019-01-23 RX ADMIN — ASCORBIC ACID, VITAMIN A PALMITATE, CHOLECALCIFEROL, THIAMINE HYDROCHLORIDE, RIBOFLAVIN-5 PHOSPHATE SODIUM, PYRIDOXINE HYDROCHLORIDE, NIACINAMIDE, DEXPANTHENOL, ALPHA-TOCOPHEROL ACETATE, VITAMIN K1, FOLIC ACID, BIOTIN, CYANOCOBALAMIN 1 MLS/HR: 200; 3300; 200; 6; 3.6; 6; 40; 15; 10; 150; 600; 60; 5 INJECTION, SOLUTION INTRAVENOUS at 23:02

## 2019-01-23 NOTE — ERD
ER Documentation


Chief Complaint


Chief Complaint





glf while walking, paramedic states low blood sugar.





HPI


This is a 73-year-old male with a past medical history of hypertension, COPD, 


atrial fibrillation on Cardizem and Eliquis who is presenting after a ground-


level fall while walking at [facility].  According to the staff at [facility], they are 


concerned about the previous been some brief loss of consciousness and 


unresponsiveness.  Upon EMS arrival, the staff was performing CPR, but the 


patient was clearly moving and looking around..  CPR was stopped.  The patient 


is alert and oriented and feels fine was alert and oriented with no complaints. 


A blood sugar was checked on the scene and found to be "low" on the monitor.  


The patient appears dehydrated, and the EMS crew had difficulty with access.  


The patient did not receive any blood sugar prior to arrival to the emergency 


department.





The patient currently has no complaints.  He feels well.  The patient denies 


feeling sick recently.  The patient denies fever or chills.  The patient has had


no headache or vision changes.  The patient does not endorse neck or back pain. 


The patient denies lightheadedness or dizziness.  The patient has had no chest 


pain or trouble breathing.  The patient denies nausea or vomiting. The patient 


denies abdominal pain. The patient denies changes to bowel movements or 


urination.  The patient has had no focal deficits.  The patient has had no we


akness or numbness or tingling to the face or extremities.





ROS


All systems reviewed and are negative except as per history of present illness.





Medications


Home Meds


Active Scripts


Metoprolol Succinate* (Toprol XL*) 100 Mg Tab.sr.24h, 200 MG PO BID for 30 Days


   Prov:LAURIE HARE MD         1/22/19


Diltiazem Hcl* (Cardizem CD*) 180 Mg Cap.sr.24h, 180 MG PO DAILY, #30 CAP


   Prov:LAURIE HARE MD         1/21/19


Furosemide* (Furosemide*) 20 Mg Tablet, 20 MG PO DAILY for 30 Days, TAB 3 


Refills


   Prov:EMERY RUSSELL         1/19/19


Fluticasone-Vilanterol (Breo Ellipta Inhaler) 100-25 Mcg/Actuation Aer.pow.ba, 1


INH INH DAILY, #1 3 Refills


   Prov:EMERY RUSESLL         1/19/19


Aspirin Delayed Release (Aspirin Delayed Release) 81 Mg Tablet.dr, 81 MG PO 


DAILY for 30 Days, 3 Refills


   Prov:EMERY RUSSELL GOSIA         1/19/19


Lisinopril* (Lisinopril*) 5 Mg Tablet, 2.5 MG PO QHS for 30 Days, TAB 3 Refills


   1/2 tab daily


   Prov:DREWEMERY ELISE         1/19/19


Apixaban* (Eliquis*) 5 Mg Tablet, 5 MG PO BID for 30 Days, TAB 3 Refills


   Prov:DREWEMERY         1/19/19


Tiotropium Bromide* (Spiriva*) 18 Mcg Cap.w.dev, 1 INH INH DAILY, #1 3 Refills


   Prov:DREWPARMJITFLORINDA GOSIA         1/19/19


Discontinued Reported Medications


Olmesartan-Amlodipine-HCTZ (Tribenzor) 40-10-25 Mg Tablet, 1 TAB PO DAILY, TAB


   1/15/19


Discontinued Scripts


Nicotine* (Nicotine* Patch) 21 mg/day Patch, 1 PATCH TRANSDERM DAILY for 7 Days


   Prov:LAURIE HARE MD         1/21/19


Metoprolol Succinate* (Toprol XL*) 50 Mg Tab.er.24h, 50 MG PO BID for 30 Days, 3


Refills


   Prov:DREWPARMJIT'DEMARILY ELISE         1/19/19





Allergies


Allergies:  


Coded Allergies:  


     No Known Drug Allergies (Verified  Allergy, Unknown, 1/23/19)





PMhx/Soc


History of Surgery:  No


Anesthesia Reaction:  No


Hx Neurological Disorder:  No


Hx Respiratory Disorders:  Yes (COPD)


Hx Cardiac Disorders:  Yes (Hypertension, atrial fibrillation on Cardizem and 


Eliquis)


Hx Psychiatric Problems:  No


Hx Miscellaneous Medical Probl:  No


Hx Alcohol Use:  Yes (occasionaly)


Hx Substance Use:  No


Hx Tobacco Use:  Yes





FmHx


Family History:  No diabetes





Physical Exam


Vitals





Vital Signs


  Date      Temp  Pulse  Resp  B/P (MAP)   Pulse Ox  O2          O2 Flow    FiO2


Time                                                 Delivery    Rate


   1/23/19          110    22      140/99        95  Nasal             2.0


     17:34                          (113)            Cannula


   1/23/19          100    22      144/95       100  Nasal             2.0


     16:02                          (111)            Cannula


   1/23/19          110    22      145/84            Room Air


     14:25                          (104)


   1/23/19  94.8    103    18      141/96


     13:10                          (111)





Physical Exam


Const:   No apparent distress, well-developed.  Thin appearing.


Head:   Normocephalic, Atraumatic.


Eyes:   Normal Conjunctiva.  Extraocular movements intact. Pupils equal, round 


and reactive to light


ENT:   Normal External Ears, Nose and Mouth.  Dry mucous membranes.


Neck:   Full range of motion. No meningismus.


Resp:   Clear to auscultation bilaterally, No wheezes, rales or rhonchi


Cardio:   Irregularly irregular rhythm.  Mild tachycardia.  No murmurs, rubs or 


gallops


Abd:   Soft, non tender, non distended. Normal bowel sounds


Skin:   No petechiae or rashes


Back:   No midline tenderness. No CVA tenderness


Ext:   No cyanosis, or edema


Neur:   Awake and alert.  Cranial nerves intact.  No facial droop.  Normal 


strength, sensation and coordination.


Psych:   Normal Mood and Affect


Result Diagram:  


1/23/19 1346                                                                    


           1/23/19 1346





Results 24 hrs





Laboratory Tests


Test
                1/23/19
13:17   1/23/19
13:46  1/23/19
14:01  1/23/19
14:02


Bedside Glucose      < 13 mg/dL                         14 mg/dL       23 mg/dL


White Blood Count                     9.0 10^3/ul


Red Blood Count                      4.96 10^6/ul


Hemoglobin                              15.5 g/dl


Hematocrit                                 48.4 %


Mean Corpuscular                          97.6 fl


Volume


Mean Corpuscular                          31.3 pg


Hemoglobin


Mean Corpuscular     
                 32.0 g/dl 
  
              



Hemoglobin
Concent


Red Cell                                   16.4 %


Distribution Width


Platelet Count                        142 10^3/UL


Mean Platelet                             11.8 fl


Volume


Immature                                  2.500 %


Granulocytes %


Neutrophils %                              78.8 %


Lymphocytes %                              13.5 %


Monocytes %                                 4.9 %


Eosinophils %                               0.0 %


Basophils %                                 0.3 %


Nucleated Red Blood                   1.9 /100WBC


Cells %


Immature                            0.220 10^3/ul


Granulocytes #


Neutrophils #                         7.1 10^3/ul


Lymphocytes #                         1.2 10^3/ul


Monocytes #                           0.4 10^3/ul


Eosinophils #                         0.0 10^3/ul


Basophils #                           0.0 10^3/ul


Nucleated Red Blood                   0.2 10^3/ul


Cells #


Prothrombin Time                         30.7 Sec


Prothrombin Time                              2.4


Ratio


[employer]    
                      2.94 
  
              



Normalized
Ratio


Sodium Level                           140 mmol/L


Potassium Level                        6.2 mmol/L


Chloride Level                         102 mmol/L


Carbon Dioxide                          18 mmol/L


Level


Anion Gap                                      20


Blood Urea Nitrogen                      71 mg/dl


Creatinine                             2.23 mg/dl


Est Glomerular       
               mL/min 
       
              



Filtrat Rate
mL/min


Glucose Level                           180 mg/dl


Calcium Level                           9.1 mg/dl


Troponin I                            0.103 ng/ml


Test
                1/23/19
14:22  
               
              



Bedside Glucose          27 mg/dL





Current Medications


 Medications
   Dose
          Sig/Moon
       Start Time
   Status  Last


 (Trade)       Ordered        Route
 PRN     Stop Time              Admin
Dose


                              Reason                                Admin


 Sodium         500 ml @ 
     Q1H STAT
      1/23/19       DC           1/23/19


Chloride       500 mls/hr     IV
            13:12
                       13:54



                                             1/23/19 14:11


 Dextrose
      50 ml          STK-MED        1/23/19       DC       



(D50w                         ONCE
 .ROUTE
  13:28



Syringe)                                     1/23/19 13:29


 Dextrose
      50 ml          STK-MED        1/23/19       DC       



(D50w                         ONCE
 .ROUTE
  14:04



Syringe)                                     1/23/19 14:05


 Dextrose
      50 ml          ONCE  ONCE
    1/23/19       DC           1/23/19


(D50w                         IV
            14:30
                       14:24



Syringe)                                     1/23/19 14:31








Procedures/MDM


MDM





The patient's presentation warrants further investigation. Previous medical 


records, if available, were reviewed.





LABS





The patient's laboratory testing was obtained and reviewed. No emergent 


treatment was required unless described below.





CBC:   No E/o of systemic infection or severe anemia or thrombocytopenia


BMP:   Elevated BUN and creatinine, double his baseline concerning for acute on 


chronic kidney disease.  Normal blood sugar, no DKA.  Anion gap metabolic 


acidosis, likely related to starvation ketosis from poor nutrition and failure 


to thrive.


PT/INR:   No E/o significant coagulopathy


Troponin:   No E/o acute ischemia





EKG





EKG read by me: 


Rate/Rhythm:    Irregularly irregular rhythm, mild tachycardia indicating atrial


fibrillation with a mild rapid ventricular response


Intervals:    No P waves.  Normal QRS and QTc.


Axis:    Normal


Impression:    A. fib with RVR. T wave inversions with less than 1 mm ST 


depressions in the lateral leads, concerning for possible lateral ischemia.





IMAGING





Imaging and Radiology interpretation reviewed.





CXR


FINDINGS: The lungs are clear. There is no pleural effusion or pneumothorax. The


cardiac silhouette is prominent, unchanged. The mediastinal contours are within 


normal limits. The aorta is tortuous and atherosclerotic.


IMPRESSION: No acute pulmonary abnormality.


Electronically viewed and signed by R-Rana Fattahi, Physician on 01/23/2019 


13:51 





TREATMENT/DISPOSITION





The patient presents after a syncopal event.  The patient appears dehydrated and


there is evidence of acute on chronic kidney injury.  I am concerned about 


starvation ketosis and failure to thrive.  I am concerned about the patient's 


ability to care for himself.  The patient does have evidence of hyperkalemia, 


but his potassium is less than 6.2 and there is no evidence of peaked T waves on


exam.  The patient was given IV fluids in the emergency department which should 


help with this.  I do not feel the patient requires emergent treatment of the 


hyperkalemia.  The patient's Accu-Chek was also found to be very low, but he 


clinically did not appear to have a blood sugar below 20.  The patient was given


a dose of dextrose in the emergency department.  The patient's BMP revealed a 


normal blood sugar despite persistent low Accu-Cheks.  I do not believe that the


Accu-Cheks are real.





The patient does have atrial fibrillation with mild tachycardia indicating a 


mild rapid ventricular response.  I do not feel this requires emergent 


treatment.  The patient's heart rate will likely benefit from rehydration in the


hospital.  The patient's troponin is within normal limits and appears to be at 


his baseline from his recent studies during his admission 1-2 weeks ago.  The 


patient's EKG does reveal nonspecific repolarization changes, which should be 


further assessed in the hospital with serial diagnostic tests.  The patient is 


on endorse any chest pain or shortness of breath.  I have decreased suspicion 


for acute coronary syndrome at this time. I do not see evidence of emergent 


heart block, Brugada syndrome or WPW. The patient has no heart murmurs or rales.


I have low suspicion for hypertrophic cardiomyopathy. I do not see evidence of 


CHF. The patient does not endorse any chest or pleuritic pain. The history is 


negative for bleeding or clotting disorders. The patient has not been involved 


in any recent prolonged trips or surgeries or hospitalizations. The patient has 


no calf tenderness or swelling. I have decreased suspicion for PE as the 


etiology of symptoms.





The patient is not clinically orthostatic. The patient is not dizzy. I have 


decreased suspicion for vertigo. The patient has no signs of emergent or 


symptomatic anemia.  The patient does not have any emergent electrolyte or 


metabolic emergencies. I have decrease suspicion for a thyroid disorder. The 


patient is not toxic appearing. I have decreased suspicion for an infectious 


etiology of symptoms. 





The patient has no focal deficits. The neurologic exam is reassuring. I have 


decreased suspicion for cerebral ischemia. There was no trauma or injury. There 


is no personal or family history of cerebral aneurysm. I have decreased 


suspicion for SAH or other ICH. I have low suspicion for temporal arteritis, 


cavernous venous thrombosis, subdural hematoma, epidural hematoma, meningitis.





At this time, I feel that the patient requires admission for further evaluation 


and management. The patient will be admitted to [facility] in accordance with the 


patient's insurance.  The patient was accepted by Dr. Pearce at 6:30 PM on 


January 23, 2019.





Disclaimer: Inadvertent spelling and grammatical errors are likely due to 


EHR/dictation software use and do not reflect on the overall quality of patient 


care. Note that the electronic time recorded on this note does not necessarily 


reflect the actual time of the patient encounter.





Departure


Diagnosis:  


   Primary Impression:  


   Syncope and collapse


   Additional Impressions:  


   Hypoglycemia


   Fall with no significant injury


   Encounter type:  initial encounter  Qualified Codes:  W19.XXXA - Unspecified 


   fall, initial encounter


   Hyperkalemia


   Acute kidney injury


   Failure to thrive


   Failure to thrive age range:  in adult  Qualified Codes:  R62.7 - Adult 


   failure to thrive


   Dehydration


Condition:  Serious











NETTIE LAGUNAS MD              Jan 23, 2019 13:50

## 2019-01-23 NOTE — DS
DATE OF ADMISSION: 01/15/2019

DATE OF DISCHARGE: 01/22/2019

 

DISCHARGE DIAGNOSES:

1.  Syncopal episode, likely related to rapid atrial fibrillation.

2.  Atrial fibrillation with rapid ventricular response, rate controlled.

3.  Hypertension.

4.  Acute kidney injury, improved.

5.  Ascending aortic aneurysm measuring 4.7 cm.

6.  Chronic smoker.

7.  T11 compression fracture, chronic.

 

HOSPITAL COURSE:  A 33-year-old male who presented with syncopal episode.  CT of the head was negativ
e.  There were no neurological deficits.  The patient was found to have atrial fibrillation with rapi
d ventricular response.  He was seen in consultation by Dr. Reese.  A stress test was negative for a
cute ischemia.  The patient was started on a beta blocker and a calcium channel blocker.  2D echo irais
wed diastolic dysfunction with ejection fraction of 50%.  His heart rate was well controlled after be
ta blocker was increased.  He is now in stable condition for discharge.  

 

PLAN:  Discharge to home with home health nurse.

 

MEDICATIONS UPON DISCHARGE:

1.  Eliquis 5 mg p.o. b.i.d.

2.  Aspirin 81 mg p.o. daily.

3.  Cardizem 180 mg p.o. daily.

4.  Metoprolol 200 mg p.o. b.i.d.

5.  Nicoderm patch 21 mg one daily for 7 days.

6.  Spiriva 18 mcg one inhalation daily.

7.  Lasix 20 mg p.o. daily.

8.  Lisinopril 2.5 mg p.o. at bedtime.

 

FOLLOWUP:  Follow-up with PCP in one week.

 

 

Dictated By: LAURIE LEVINE/ELADIO

DD:    01/22/2019 09:51:16

DT:    01/22/2019 17:47:33

Conf#: [id number]

DID#:  [id number]

CC: DAVION REESE DO; EMERY RUSSELL MD;*EndCC*

## 2019-01-24 VITALS — DIASTOLIC BLOOD PRESSURE: 79 MMHG | HEART RATE: 91 BPM | SYSTOLIC BLOOD PRESSURE: 144 MMHG | RESPIRATION RATE: 18 BRPM

## 2019-01-24 VITALS — HEART RATE: 67 BPM | RESPIRATION RATE: 20 BRPM | SYSTOLIC BLOOD PRESSURE: 125 MMHG | DIASTOLIC BLOOD PRESSURE: 72 MMHG

## 2019-01-24 VITALS — SYSTOLIC BLOOD PRESSURE: 146 MMHG | DIASTOLIC BLOOD PRESSURE: 92 MMHG | HEART RATE: 54 BPM | RESPIRATION RATE: 20 BRPM

## 2019-01-24 VITALS — HEART RATE: 68 BPM | SYSTOLIC BLOOD PRESSURE: 137 MMHG | DIASTOLIC BLOOD PRESSURE: 86 MMHG | RESPIRATION RATE: 20 BRPM

## 2019-01-24 VITALS — HEART RATE: 61 BPM | RESPIRATION RATE: 18 BRPM | SYSTOLIC BLOOD PRESSURE: 123 MMHG | DIASTOLIC BLOOD PRESSURE: 67 MMHG

## 2019-01-24 VITALS — DIASTOLIC BLOOD PRESSURE: 81 MMHG | RESPIRATION RATE: 18 BRPM | SYSTOLIC BLOOD PRESSURE: 140 MMHG | HEART RATE: 97 BPM

## 2019-01-24 VITALS — HEART RATE: 91 BPM

## 2019-01-24 VITALS — HEART RATE: 119 BPM

## 2019-01-24 VITALS — HEART RATE: 118 BPM

## 2019-01-24 VITALS — HEART RATE: 88 BPM

## 2019-01-24 LAB
ADD MAN DIFF?: NO
ANION GAP: 11 (ref 5–13)
BASOPHIL #: 0 10^3/UL (ref 0–0.1)
BASOPHILS %: 0.3 % (ref 0–2)
BLOOD UREA NITROGEN: 76 MG/DL (ref 7–20)
CALCIUM: 9.1 MG/DL (ref 8.4–10.2)
CARBON DIOXIDE: 25 MMOL/L (ref 21–31)
CHLORIDE: 108 MMOL/L (ref 97–110)
CREATININE: 1.85 MG/DL (ref 0.61–1.24)
EOSINOPHILS #: 0 10^3/UL (ref 0–0.5)
EOSINOPHILS %: 0 % (ref 0–7)
GLUCOSE: 111 MG/DL (ref 70–220)
HEMATOCRIT: 45.2 % (ref 42–52)
HEMOGLOBIN A1C: 5.8 % (ref 0–5.9)
HEMOGLOBIN: 15 G/DL (ref 14–18)
IMMATURE GRANS #M: 0.1 10^3/UL (ref 0–0.03)
IMMATURE GRANS % (M): 1.3 % (ref 0–0.43)
LYMPHOCYTES #: 1.1 10^3/UL (ref 0.8–2.9)
LYMPHOCYTES %: 14 % (ref 15–51)
MAGNESIUM: 2.5 MG/DL (ref 1.7–2.5)
MEAN CORPUSCULAR HEMOGLOBIN: 31.3 PG (ref 29–33)
MEAN CORPUSCULAR HGB CONC: 33.2 G/DL (ref 32–37)
MEAN CORPUSCULAR VOLUME: 94.2 FL (ref 82–101)
MEAN PLATELET VOLUME: 12.2 FL (ref 7.4–10.4)
MONOCYTE #: 0.7 10^3/UL (ref 0.3–0.9)
MONOCYTES %: 8.8 % (ref 0–11)
NEUTROPHIL #: 5.8 10^3/UL (ref 1.6–7.5)
NEUTROPHILS %: 75.6 % (ref 39–77)
NUCLEATED RED BLOOD CELLS #: 0.2 10^3/UL (ref 0–0)
NUCLEATED RED BLOOD CELLS%: 2.9 /100WBC (ref 0–0)
PLATELET COUNT: 132 10^3/UL (ref 140–415)
POSITIVE DIFF: (no result)
POTASSIUM: 4.8 MMOL/L (ref 3.5–5.1)
RED BLOOD COUNT: 4.8 10^6/UL (ref 4.7–6.1)
RED CELL DISTRIBUTION WIDTH: 16.1 % (ref 11.5–14.5)
SODIUM: 144 MMOL/L (ref 135–144)
THYROID STIMULATING HORMONE: 3.49 MIU/L (ref 0.47–4.68)
WHITE BLOOD COUNT: 7.7 10^3/UL (ref 4.8–10.8)

## 2019-01-24 RX ADMIN — METOPROLOL SUCCINATE SCH MG: 100 TABLET, FILM COATED, EXTENDED RELEASE ORAL at 20:50

## 2019-01-24 RX ADMIN — POTASSIUM CHLORIDE SCH MLS/HR: 2 INJECTION, SOLUTION, CONCENTRATE INTRAVENOUS at 19:44

## 2019-01-24 RX ADMIN — METOPROLOL SUCCINATE 1 MG: 100 TABLET, EXTENDED RELEASE ORAL at 20:50

## 2019-01-24 RX ADMIN — ASPIRIN 1 MG: 81 TABLET, COATED ORAL at 08:33

## 2019-01-24 RX ADMIN — METOPROLOL SUCCINATE SCH MG: 100 TABLET, FILM COATED, EXTENDED RELEASE ORAL at 08:33

## 2019-01-24 RX ADMIN — APIXABAN 1 MG: 5 TABLET, FILM COATED ORAL at 20:50

## 2019-01-24 RX ADMIN — FAMOTIDINE SCH MG: 20 TABLET ORAL at 22:24

## 2019-01-24 RX ADMIN — DILTIAZEM HYDROCHLORIDE 1 MG: 180 CAPSULE, COATED, EXTENDED RELEASE ORAL at 08:34

## 2019-01-24 RX ADMIN — APIXABAN SCH MG: 5 TABLET, FILM COATED ORAL at 20:50

## 2019-01-24 RX ADMIN — ASCORBIC ACID, VITAMIN A PALMITATE, CHOLECALCIFEROL, THIAMINE HYDROCHLORIDE, RIBOFLAVIN-5 PHOSPHATE SODIUM, PYRIDOXINE HYDROCHLORIDE, NIACINAMIDE, DEXPANTHENOL, ALPHA-TOCOPHEROL ACETATE, VITAMIN K1, FOLIC ACID, BIOTIN, CYANOCOBALAMIN 1 MLS/HR: 200; 3300; 200; 6; 3.6; 6; 40; 15; 10; 150; 600; 60; 5 INJECTION, SOLUTION INTRAVENOUS at 19:44

## 2019-01-24 RX ADMIN — ASPIRIN SCH MG: 81 TABLET, COATED ORAL at 08:33

## 2019-01-24 RX ADMIN — DIGOXIN SCH MG: 125 TABLET ORAL at 13:42

## 2019-01-24 RX ADMIN — DIGOXIN 1 MG: 125 TABLET ORAL at 13:42

## 2019-01-24 RX ADMIN — TIOTROPIUM BROMIDE 1 INH: 18 CAPSULE ORAL; RESPIRATORY (INHALATION) at 13:44

## 2019-01-24 RX ADMIN — FAMOTIDINE 1 MG: 20 TABLET ORAL at 22:24

## 2019-01-24 RX ADMIN — FLUTICASONE FUROATE AND VILANTEROL TRIFENATATE 1 INH: 100; 25 POWDER RESPIRATORY (INHALATION) at 09:00

## 2019-01-24 RX ADMIN — ASCORBIC ACID, VITAMIN A PALMITATE, CHOLECALCIFEROL, THIAMINE HYDROCHLORIDE, RIBOFLAVIN-5 PHOSPHATE SODIUM, PYRIDOXINE HYDROCHLORIDE, NIACINAMIDE, DEXPANTHENOL, ALPHA-TOCOPHEROL ACETATE, VITAMIN K1, FOLIC ACID, BIOTIN, CYANOCOBALAMIN 1 MLS/HR: 200; 3300; 200; 6; 3.6; 6; 40; 15; 10; 150; 600; 60; 5 INJECTION, SOLUTION INTRAVENOUS at 08:44

## 2019-01-24 RX ADMIN — FLUTICASONE FUROATE AND VILANTEROL TRIFENATATE SCH INH: 100; 25 POWDER RESPIRATORY (INHALATION) at 09:00

## 2019-01-24 RX ADMIN — APIXABAN 1 MG: 5 TABLET, FILM COATED ORAL at 08:33

## 2019-01-24 RX ADMIN — POTASSIUM CHLORIDE SCH MLS/HR: 2 INJECTION, SOLUTION, CONCENTRATE INTRAVENOUS at 08:44

## 2019-01-24 RX ADMIN — METOPROLOL SUCCINATE 1 MG: 100 TABLET, EXTENDED RELEASE ORAL at 08:33

## 2019-01-24 RX ADMIN — APIXABAN SCH MG: 5 TABLET, FILM COATED ORAL at 08:33

## 2019-01-24 RX ADMIN — ACETAMINOPHEN 1 MG: 325 TABLET, FILM COATED ORAL at 14:52

## 2019-01-24 NOTE — NUR
RN NOTE 

PT IS CONFUSED AND NON COMPLIANCE WITH PLAN OF CARE , INCONTINENT . PT HAD 2.7 SEC PAUSE ON 
THE TELE MONITOR , NOT SYMPTOMATIC , LEFT A DETAILED MASSAGE FOR DR MEDINA REGARDING 
THAT .

## 2019-01-24 NOTE — HP
DATE OF ADMISSION: 01/23/2019

 

CHIEF COMPLAINT:  Ground level fall.

 

HISTORY OF PRESENT ILLNESS:  A 73-year-old male with chronic atrial fibrillation and hypertension, re
cently discharged from [facility], was brought into the emergency room after he had
 a ground level fall at [facility].  The patient denies any syncopal episode.  He denies any associated ches
t pain or shortness of breath.  He was found to be hypoglycemic in the field.  Blood sugars in the ER
 were as low as 14.  His mental status improved after he received D50 and placed on D5.  Patient was 
also noted to have acute kidney injury with BUN of 71 and creatinine of 2.23.

 

PAST MEDICAL HISTORY:

1.  COPD.

2.  Hypertension.

3.  Chronic atrial fibrillation.

4.  Recent syncopal episodes.

 

MEDICATIONS PRIOR TO ADMISSION:  

1.  Spiriva.

2.  Eliquis.

3.  Diltiazem. 

4.  Lisinopril.

5.  Metoprolol.

6.  Aspirin. 

7.  Lasix.

 

SOCIAL HISTORY:  The patient has remote history of heavy tobacco use.  He denies alcohol.

 

PHYSICAL EXAMINATION:

GENERAL:  Well-developed, well-nourished male who is in no apparent distress.

VITAL SIGNS:  Blood pressure 119/66, pulse is 118 and he is afebrile.

HEENT:  Extraocular muscles intact.  Pupils equal and reactive to light bilaterally.  Sclerae are ani
cteric.  Oropharynx is clear and moist.

NECK:  Supple, no JVD, no carotid bruits.

LUNGS:  Clear to auscultation bilaterally.

CARDIAC:  Irregularly irregular.  No murmurs or gallops.

ABDOMEN:  Soft, nontender, nondistended, normoactive bowel sounds.

EXTREMITIES:  No clubbing, cyanosis, or edema.

NEUROLOGICAL:  Grossly nonfocal.

 

LABORATORY DATA:  Sodium 144, potassium 4.8, chloride 108, bicarbonate 25, BUN 76, creatinine is 1.85
.  White blood cell count 7.7, hemoglobin 15, platelet count is 132,000.

 

ASSESSMENT:

1.  A 73-year-old male with a ground level fall due to hypoglycemic reaction.

2.  Dehydration.

3.  Acute kidney injury.

4.  Hyperkalemia, resolved.

5.  Chronic obstructive pulmonary disease.

6.  Chronic atrial fibrillation.

7.  Hypertension.

 

PLAN:  Place in tele observation.  Continue IV fluid, hold Lasix and ACE inhibitor.  Resume other michael
e medications.

 

 

Dictated By: LAURIE LEVINE/ELADIO

DD:    01/24/2019 09:36:36

DT:    01/24/2019 10:47:51

Conf#: [id number]

DID#:  [id number]

## 2019-01-24 NOTE — CONS
Assessment/Plan


Assessment/Plan


Assessment/Plan (Daily)


Assessment:


1) Fall versus syncope


2) chronic afib now with RVR


3) LV dysfunction


4) no gross fluid overload


5) no ischemia in recent stress test


6) HTN





Plan:


1) will dc cardizem given LV dysfunction


2) will add digoxin


3) will consider amiodarone


4) carotid duplex pending





Consultation Date/Type/Reason


Admit Date/Time


Jan 23, 2019 at 18:29


Type of Consult


Cardiology


Date/Time of Note


DATE: 1/24/19 


TIME: 13:01





Hx of Present Illness


patient admitted with fall versus syncope. states he had a mechanical fall and 


"tripped over his socks", where upon "EMS just took him", no chest pain or sob, 


but it is not clear as to how reliable the patient's recollection of events is.


Respiratory:  no complaints


Cardiovascular:  no complaints


Gastrointestinal:  no complaints


Musculoskeletal:  no complaints


Skin:  no complaints





Past Medical History


Medical History:  high cholesterol, hypertension, renal disease


Home Meds


Active Scripts


Metoprolol Succinate* (Toprol XL*) 100 Mg Tab.sr.24h, 200 MG PO BID for 30 Days


   Prov:LAURIE HARE MD         1/22/19


Diltiazem Hcl* (Cardizem CD*) 180 Mg Cap.sr.24h, 180 MG PO DAILY, #30 CAP


   Prov:LAURIE HARE MD         1/21/19


Furosemide* (Furosemide*) 20 Mg Tablet, 20 MG PO DAILY for 30 Days, TAB 3 


Refills


   Prov:EMERY RUSSELL         1/19/19


Fluticasone-Vilanterol (Breo Ellipta Inhaler) 100-25 Mcg/Actuation Aer.pow.ba, 1


INH INH DAILY, #1 3 Refills


   Prov:EMERY RUSSELL         1/19/19


Aspirin Delayed Release (Aspirin Delayed Release) 81 Mg Tablet.dr, 81 MG PO 


DAILY for 30 Days, 3 Refills


   Prov:EMERY RUSSELL         1/19/19


Lisinopril* (Lisinopril*) 5 Mg Tablet, 2.5 MG PO QHS for 30 Days, TAB 3 Refills


   1/2 tab daily


   Prov:EMERY RUSSELL         1/19/19


Apixaban* (Eliquis*) 5 Mg Tablet, 5 MG PO BID for 30 Days, TAB 3 Refills


   Prov:DREWEMERY ELISE         1/19/19


Tiotropium Bromide* (Spiriva*) 18 Mcg Cap.w.dev, 1 INH INH DAILY, #1 3 Refills


   Prov:DREWEMERY ELISE         1/19/19


Discontinued Reported Medications


Olmesartan-Amlodipine-HCTZ (Tribenzor) 40-10-25 Mg Tablet, 1 TAB PO DAILY, TAB


   1/15/19


Discontinued Scripts


Nicotine* (Nicotine* Patch) 21 mg/day Patch, 1 PATCH TRANSDERM DAILY for 7 Days


   Prov:LAURIE HARE MD         1/21/19


Metoprolol Succinate* (Toprol XL*) 50 Mg Tab.er.24h, 50 MG PO BID for 30 Days, 3


Refills


   Prov:DREWEMERY GOSIA         1/19/19


Medications





Current Medications


IV Flush (NS 3 ml) 3 ml PER PROTOCOL IV ;  Start 1/23/19 at 22:30


Ondansetron HCl (Zofran Tab) 4 mg Q6H  PRN PO nausea;  Start 1/23/19 at 22:30


Acetaminophen (Tylenol Tab) 650 mg Q6H  PRN PO fever;  Start 1/23/19 at 22:30


Docusate Sodium (Colace) 100 mg Q12H  PRN PO CONSTIPATION;  Start 1/23/19 at 


22:30


Magnesium Hydroxide (Milk Of Mag) 30 ml DAILY  PRN PO indigestion;  Start 


1/23/19 at 22:30


Famotidine (Pepcid) 20 mg Q24H PO  Last administered on 1/23/19at 23:02; Admin 


Dose 20 MG;  Start 1/23/19 at 23:00


Dextrose/Sodium Chloride 1,000 ml @  100 mls/hr Q10H IV  Last administered on 


1/24/19at 08:44; Admin Dose 100 MLS/HR;  Start 1/23/19 at 23:00


Apixaban (Eliquis) 5 mg BID PO  Last administered on 1/24/19at 08:33; Admin Dose


5 MG;  Start 1/23/19 at 23:00


Aspirin (Halfprin) 81 mg DAILY PO  Last administered on 1/24/19at 08:33; Admin 


Dose 81 MG;  Start 1/24/19 at 09:00


Fluticasone/ Vilanterol (Breo Ellipta 100-25 Mcg Inh) 1 inh DAILY INH ;  Start 


1/24/19 at 09:00


Metoprolol Succinate (Toprol Xl) 200 mg BID PO  Last administered on 1/24/19at 


08:33; Admin Dose 200 MG;  Start 1/23/19 at 23:00


Tiotropium Bromide (Spiriva) 1 inh DAILY INH ;  Start 1/24/19 at 09:00


Digoxin (Digoxin) 0.125 mg DAILY@13 PO ;  Start 1/24/19 at 13:00;  Status UNV


Allergies:  


Coded Allergies:  


     No Known Drug Allergies (Verified  Allergy, Unknown, 1/23/19)





Past Surgical History


Past Surgical Hx:  no surgical history





Family History


Significant Family History:  hypertension





Social History


Smoking Status:  Current some day smoker





Exam/Review of Systems


Vital Signs


Vitals





Vital Signs


  Date      Temp  Pulse  Resp  B/P (MAP)   Pulse Ox  O2          O2 Flow    FiO2


Time                                                 Delivery    Rate


   1/24/19          119


     12:48


   1/24/19  98.0           20      146/92        95


     11:29                          (110)


   1/24/19                                           Nasal


     07:48                                           Cannula


   1/23/19                                                             2.0


     19:31








Intake and Output





1/23/19 1/23/19 1/24/19





1515:00


23:00


07:00





IntakeIntake Total


1100 ml





BalanceBalance


1100 ml














Exam


Constitutional:  alert


Head:  normocephalic, atraumatic


Neck:  supple


Respiratory:  clear to auscultation


Cardiovascular:  irregular rhythm


Gastrointestinal:  soft


Musculoskeletal:  nl extremities to inspection


Extremities:  normal pulses





Labs


Result Diagram:  


1/24/19 0530                                                                    


           1/24/19 0530





Results 24hrs





Laboratory Tests


Test
                 1/23/19
13:17  1/23/19
13:46  1/23/19
14:01  1/23/19
14:02


Bedside Glucose       < 13  *L                             14  *L         23  *L


White Blood Count                            9.0


Red Blood Count                             4.96


Hemoglobin                                  15.5


Hematocrit                                  48.4


Mean Corpuscular                            97.6


Volume


Mean Corpuscular                            31.3


Hemoglobin


Mean Corpuscular      
                    32.0  
  
              



Hemoglobin
Concent


Red Cell                                   16.4  H


Distribution Width


Platelet Count                               142


Mean Platelet Volume                       11.8  H


Immature                                  2.500  H


Granulocytes %


Neutrophils %                              78.8  H


Lymphocytes %                              13.5  L


Monocytes %                                  4.9


Eosinophils %                                0.0


Basophils %                                  0.3


Nucleated Red Blood                         1.9  H


Cells %


Immature                                  0.220  H


Granulocytes #


Neutrophils #                                7.1


Lymphocytes #                                1.2


Monocytes #                                  0.4


Eosinophils #                                0.0


Basophils #                                  0.0


Nucleated Red Blood                         0.2  H


Cells #


Prothrombin Time                          30.7  #H


Prothrombin Time                             2.4


Ratio


INR International     
                    2.94  
  
              



Normalized
Ratio


Sodium Level                                 140


Potassium Level                            6.2  *H


Chloride Level                               102


Carbon Dioxide Level                         18  L


Anion Gap                                   20  #H


Blood Urea Nitrogen                          71  H


Creatinine                                 2.23  H


Est Glomerular        
                
            
              



Filtrat Rate
mL/min


Glucose Level                                180


Calcium Level                                9.1


Troponin I                                 0.103


Test
                 1/23/19
14:22  1/23/19
22:42  1/23/19
23:03  1/24/19
01:29


Bedside Glucose              27  *L           82                           106


Sodium Level                                               145  H


Potassium Level                                            5.3  H


Chloride Level                                              102


Carbon Dioxide Level                                         25


Anion Gap                                                   18  H


Blood Urea Nitrogen                                         75  H


Creatinine                                                1.83  H


Est Glomerular        
              
                
            



Filtrat Rate
mL/min


Glucose Level                                              104  #


Calcium Level                                               9.2


Test
                 1/24/19
05:30  
              
              



White Blood Count             7.7


Red Blood Count              4.80


Hemoglobin                   15.0


Hematocrit                   45.2


Mean Corpuscular             94.2


Volume


Mean Corpuscular             31.3


Hemoglobin


Mean Corpuscular            33.2  
  
              
              



Hemoglobin
Concent


Red Cell                    16.1  H


Distribution Width


Platelet Count               132  L


Mean Platelet Volume        12.2  H


Immature                   1.300  H


Granulocytes %


Neutrophils %                75.6


Lymphocytes %               14.0  L


Monocytes %                   8.8


Eosinophils %                 0.0


Basophils %                   0.3


Nucleated Red Blood          2.9  H


Cells %


Immature                   0.100  H


Granulocytes #


Neutrophils #                 5.8


Lymphocytes #                 1.1


Monocytes #                   0.7


Eosinophils #                 0.0


Basophils #                   0.0


Nucleated Red Blood          0.2  H


Cells #


Sodium Level                  144


Potassium Level               4.8


Chloride Level                108


Carbon Dioxide Level           25


Anion Gap                     11  #


Blood Urea Nitrogen           76  H


Creatinine                  1.85  H


Est Glomerular          
            
              
              



Filtrat Rate
mL/min


Glucose Level                 111


Hemoglobin A1c                5.8


Calcium Level                 9.1


Magnesium Level               2.5


Thyroid Stimulating        3.490  
  
              
              



Hormone
(TSH)














ALEENA MEDINA MD        Jan 24, 2019 13:06

## 2019-01-25 VITALS — RESPIRATION RATE: 18 BRPM | SYSTOLIC BLOOD PRESSURE: 135 MMHG | DIASTOLIC BLOOD PRESSURE: 109 MMHG | HEART RATE: 60 BPM

## 2019-01-25 VITALS — RESPIRATION RATE: 28 BRPM | DIASTOLIC BLOOD PRESSURE: 108 MMHG | HEART RATE: 55 BPM | SYSTOLIC BLOOD PRESSURE: 121 MMHG

## 2019-01-25 VITALS — SYSTOLIC BLOOD PRESSURE: 95 MMHG | DIASTOLIC BLOOD PRESSURE: 57 MMHG | RESPIRATION RATE: 20 BRPM | HEART RATE: 74 BPM

## 2019-01-25 VITALS — SYSTOLIC BLOOD PRESSURE: 114 MMHG | DIASTOLIC BLOOD PRESSURE: 88 MMHG | RESPIRATION RATE: 27 BRPM | HEART RATE: 54 BPM

## 2019-01-25 VITALS — DIASTOLIC BLOOD PRESSURE: 90 MMHG | HEART RATE: 64 BPM | RESPIRATION RATE: 19 BRPM | SYSTOLIC BLOOD PRESSURE: 108 MMHG

## 2019-01-25 VITALS — DIASTOLIC BLOOD PRESSURE: 72 MMHG | HEART RATE: 53 BPM | RESPIRATION RATE: 22 BRPM | SYSTOLIC BLOOD PRESSURE: 115 MMHG

## 2019-01-25 VITALS — RESPIRATION RATE: 19 BRPM | SYSTOLIC BLOOD PRESSURE: 112 MMHG | DIASTOLIC BLOOD PRESSURE: 76 MMHG | HEART RATE: 53 BPM

## 2019-01-25 VITALS — SYSTOLIC BLOOD PRESSURE: 113 MMHG | DIASTOLIC BLOOD PRESSURE: 86 MMHG | RESPIRATION RATE: 20 BRPM | HEART RATE: 66 BPM

## 2019-01-25 VITALS — SYSTOLIC BLOOD PRESSURE: 125 MMHG | RESPIRATION RATE: 14 BRPM | DIASTOLIC BLOOD PRESSURE: 103 MMHG | HEART RATE: 57 BPM

## 2019-01-25 VITALS — DIASTOLIC BLOOD PRESSURE: 143 MMHG | RESPIRATION RATE: 19 BRPM | HEART RATE: 60 BPM | SYSTOLIC BLOOD PRESSURE: 179 MMHG

## 2019-01-25 VITALS — RESPIRATION RATE: 19 BRPM | SYSTOLIC BLOOD PRESSURE: 120 MMHG | DIASTOLIC BLOOD PRESSURE: 109 MMHG | HEART RATE: 66 BPM

## 2019-01-25 VITALS — RESPIRATION RATE: 23 BRPM | SYSTOLIC BLOOD PRESSURE: 118 MMHG | DIASTOLIC BLOOD PRESSURE: 84 MMHG | HEART RATE: 103 BPM

## 2019-01-25 VITALS — HEART RATE: 70 BPM | RESPIRATION RATE: 20 BRPM | SYSTOLIC BLOOD PRESSURE: 124 MMHG | DIASTOLIC BLOOD PRESSURE: 80 MMHG

## 2019-01-25 VITALS — RESPIRATION RATE: 22 BRPM | SYSTOLIC BLOOD PRESSURE: 101 MMHG | HEART RATE: 68 BPM | DIASTOLIC BLOOD PRESSURE: 69 MMHG

## 2019-01-25 VITALS — SYSTOLIC BLOOD PRESSURE: 113 MMHG | RESPIRATION RATE: 22 BRPM | HEART RATE: 60 BPM | DIASTOLIC BLOOD PRESSURE: 83 MMHG

## 2019-01-25 VITALS — HEART RATE: 67 BPM | RESPIRATION RATE: 19 BRPM | SYSTOLIC BLOOD PRESSURE: 108 MMHG | DIASTOLIC BLOOD PRESSURE: 89 MMHG

## 2019-01-25 VITALS — SYSTOLIC BLOOD PRESSURE: 87 MMHG | RESPIRATION RATE: 26 BRPM | DIASTOLIC BLOOD PRESSURE: 76 MMHG | HEART RATE: 49 BPM

## 2019-01-25 VITALS — HEART RATE: 61 BPM

## 2019-01-25 VITALS — HEART RATE: 66 BPM | SYSTOLIC BLOOD PRESSURE: 120 MMHG | RESPIRATION RATE: 22 BRPM | DIASTOLIC BLOOD PRESSURE: 98 MMHG

## 2019-01-25 VITALS — SYSTOLIC BLOOD PRESSURE: 105 MMHG | DIASTOLIC BLOOD PRESSURE: 67 MMHG | HEART RATE: 65 BPM | RESPIRATION RATE: 21 BRPM

## 2019-01-25 VITALS — RESPIRATION RATE: 35 BRPM | HEART RATE: 55 BPM | SYSTOLIC BLOOD PRESSURE: 121 MMHG | DIASTOLIC BLOOD PRESSURE: 91 MMHG

## 2019-01-25 VITALS — HEART RATE: 40 BPM

## 2019-01-25 VITALS — HEART RATE: 87 BPM

## 2019-01-25 VITALS — HEART RATE: 60 BPM | RESPIRATION RATE: 21 BRPM | DIASTOLIC BLOOD PRESSURE: 67 MMHG | SYSTOLIC BLOOD PRESSURE: 105 MMHG

## 2019-01-25 VITALS — HEART RATE: 52 BPM | DIASTOLIC BLOOD PRESSURE: 70 MMHG | SYSTOLIC BLOOD PRESSURE: 110 MMHG | RESPIRATION RATE: 20 BRPM

## 2019-01-25 VITALS — HEART RATE: 45 BPM | RESPIRATION RATE: 22 BRPM

## 2019-01-25 VITALS — DIASTOLIC BLOOD PRESSURE: 74 MMHG | RESPIRATION RATE: 32 BRPM | HEART RATE: 55 BPM | SYSTOLIC BLOOD PRESSURE: 88 MMHG

## 2019-01-25 VITALS — SYSTOLIC BLOOD PRESSURE: 82 MMHG | DIASTOLIC BLOOD PRESSURE: 72 MMHG | HEART RATE: 54 BPM | RESPIRATION RATE: 20 BRPM

## 2019-01-25 VITALS — HEART RATE: 30 BPM

## 2019-01-25 VITALS — HEART RATE: 64 BPM | SYSTOLIC BLOOD PRESSURE: 117 MMHG | RESPIRATION RATE: 18 BRPM | DIASTOLIC BLOOD PRESSURE: 87 MMHG

## 2019-01-25 VITALS — DIASTOLIC BLOOD PRESSURE: 65 MMHG | HEART RATE: 65 BPM | SYSTOLIC BLOOD PRESSURE: 125 MMHG | RESPIRATION RATE: 16 BRPM

## 2019-01-25 VITALS — DIASTOLIC BLOOD PRESSURE: 87 MMHG | SYSTOLIC BLOOD PRESSURE: 120 MMHG | HEART RATE: 94 BPM | RESPIRATION RATE: 20 BRPM

## 2019-01-25 VITALS — HEART RATE: 97 BPM

## 2019-01-25 VITALS — HEART RATE: 132 BPM

## 2019-01-25 VITALS — HEART RATE: 88 BPM

## 2019-01-25 LAB
ADD MAN DIFF?: NO
ALLEN TEST: (no result)
ANION GAP: 15 (ref 5–13)
ANION GAP: 15 (ref 5–13)
ANION GAP: 21 (ref 5–13)
ARTERIAL BASE EXCESS: -16.2 MMOL/L (ref -3–3)
ARTERIAL BLOOD GAS OXYGEN SAT: 98.6 MMHG (ref 95–100)
ARTERIAL COHB: 0.3 % (ref 0–3)
ARTERIAL FRACTION OF OXYHGB: 98.1 % (ref 93–99)
ARTERIAL HCO3: 8.4 MMOL/L (ref 22–26)
ARTERIAL METHB: 0.2 % (ref 0–1.5)
ARTERIAL PCO2: 18.9 MMHG (ref 35–45)
BASOPHIL #: 0 10^3/UL (ref 0–0.1)
BASOPHILS %: 0.2 % (ref 0–2)
BLOOD UREA NITROGEN: 70 MG/DL (ref 7–20)
BLOOD UREA NITROGEN: 73 MG/DL (ref 7–20)
BLOOD UREA NITROGEN: 77 MG/DL (ref 7–20)
CALCIUM: 8 MG/DL (ref 8.4–10.2)
CALCIUM: 8.3 MG/DL (ref 8.4–10.2)
CALCIUM: 9.3 MG/DL (ref 8.4–10.2)
CARBON DIOXIDE: 14 MMOL/L (ref 21–31)
CARBON DIOXIDE: 20 MMOL/L (ref 21–31)
CARBON DIOXIDE: 25 MMOL/L (ref 21–31)
CHLORIDE: 106 MMOL/L (ref 97–110)
CHLORIDE: 108 MMOL/L (ref 97–110)
CHLORIDE: 112 MMOL/L (ref 97–110)
CREATININE: 1.6 MG/DL (ref 0.61–1.24)
CREATININE: 1.75 MG/DL (ref 0.61–1.24)
CREATININE: 1.85 MG/DL (ref 0.61–1.24)
EOSINOPHILS #: 0 10^3/UL (ref 0–0.5)
EOSINOPHILS %: 0 % (ref 0–7)
FIO2: 100 %
GLUCOSE: 120 MG/DL (ref 70–220)
GLUCOSE: 468 MG/DL (ref 70–220)
GLUCOSE: 86 MG/DL (ref 70–220)
HEMATOCRIT: 36.2 % (ref 42–52)
HEMOGLOBIN: 11.3 G/DL (ref 14–18)
IMMATURE GRANS #M: 0.33 10^3/UL (ref 0–0.03)
IMMATURE GRANS % (M): 3.6 % (ref 0–0.43)
LYMPHOCYTES #: 1.5 10^3/UL (ref 0.8–2.9)
LYMPHOCYTES %: 16.7 % (ref 15–51)
MAGNESIUM: 2.7 MG/DL (ref 1.7–2.5)
MEAN CORPUSCULAR HEMOGLOBIN: 31.7 PG (ref 29–33)
MEAN CORPUSCULAR HGB CONC: 31.2 G/DL (ref 32–37)
MEAN CORPUSCULAR VOLUME: 101.4 FL (ref 82–101)
MEAN PLATELET VOLUME: 12.1 FL (ref 7.4–10.4)
MODE: (no result)
MONOCYTE #: 0.4 10^3/UL (ref 0.3–0.9)
MONOCYTES %: 4.4 % (ref 0–11)
NEUTROPHIL #: 6.8 10^3/UL (ref 1.6–7.5)
NEUTROPHILS %: 75.1 % (ref 39–77)
NUCLEATED RED BLOOD CELLS #: 0.8 10^3/UL (ref 0–0)
NUCLEATED RED BLOOD CELLS%: 8.6 /100WBC (ref 0–0)
O2 A-A PPRESDIFF RESPIRATORY: 524.3 MMHG (ref 7–24)
PLATELET COUNT: 100 10^3/UL (ref 140–415)
POTASSIUM: 4.8 MMOL/L (ref 3.5–5.1)
POTASSIUM: 5.2 MMOL/L (ref 3.5–5.1)
POTASSIUM: 5.9 MMOL/L (ref 3.5–5.1)
RED BLOOD COUNT: 3.57 10^6/UL (ref 4.7–6.1)
RED CELL DISTRIBUTION WIDTH: 16.8 % (ref 11.5–14.5)
SODIUM: 141 MMOL/L (ref 135–144)
SODIUM: 147 MMOL/L (ref 135–144)
SODIUM: 148 MMOL/L (ref 135–144)
TROPONIN-I: 0.11 NG/ML (ref 0–0.12)
WHITE BLOOD COUNT: 9.1 10^3/UL (ref 4.8–10.8)

## 2019-01-25 RX ADMIN — DEXTROSE MONOHYDRATE 1 ML: 500 INJECTION PARENTERAL at 11:08

## 2019-01-25 RX ADMIN — APIXABAN 1 MG: 5 TABLET, FILM COATED ORAL at 21:00

## 2019-01-25 RX ADMIN — APIXABAN SCH MG: 5 TABLET, FILM COATED ORAL at 08:18

## 2019-01-25 RX ADMIN — FAMOTIDINE SCH MG: 20 TABLET ORAL at 23:00

## 2019-01-25 RX ADMIN — APIXABAN 1 MG: 5 TABLET, FILM COATED ORAL at 08:18

## 2019-01-25 RX ADMIN — APIXABAN SCH MG: 5 TABLET, FILM COATED ORAL at 21:00

## 2019-01-25 RX ADMIN — SODIUM CHLORIDE 1 MLS/HR: 234 INJECTION INTRAMUSCULAR; INTRAVENOUS; SUBCUTANEOUS at 19:51

## 2019-01-25 RX ADMIN — ASPIRIN 1 MG: 81 TABLET, COATED ORAL at 08:17

## 2019-01-25 RX ADMIN — POTASSIUM CHLORIDE SCH MLS/HR: 2 INJECTION, SOLUTION, CONCENTRATE INTRAVENOUS at 05:00

## 2019-01-25 RX ADMIN — FLUTICASONE FUROATE AND VILANTEROL TRIFENATATE 1 INH: 100; 25 POWDER RESPIRATORY (INHALATION) at 08:19

## 2019-01-25 RX ADMIN — METOPROLOL SUCCINATE 1 MG: 100 TABLET, EXTENDED RELEASE ORAL at 08:18

## 2019-01-25 RX ADMIN — ASCORBIC ACID, VITAMIN A PALMITATE, CHOLECALCIFEROL, THIAMINE HYDROCHLORIDE, RIBOFLAVIN-5 PHOSPHATE SODIUM, PYRIDOXINE HYDROCHLORIDE, NIACINAMIDE, DEXPANTHENOL, ALPHA-TOCOPHEROL ACETATE, VITAMIN K1, FOLIC ACID, BIOTIN, CYANOCOBALAMIN 1 MLS/HR: 200; 3300; 200; 6; 3.6; 6; 40; 15; 10; 150; 600; 60; 5 INJECTION, SOLUTION INTRAVENOUS at 05:00

## 2019-01-25 RX ADMIN — SODIUM CHLORIDE 1 MLS/HR: 234 INJECTION INTRAMUSCULAR; INTRAVENOUS; SUBCUTANEOUS at 11:10

## 2019-01-25 RX ADMIN — METOPROLOL SUCCINATE SCH MG: 100 TABLET, FILM COATED, EXTENDED RELEASE ORAL at 21:00

## 2019-01-25 RX ADMIN — FAMOTIDINE 1 MG: 20 TABLET ORAL at 23:00

## 2019-01-25 RX ADMIN — FLUTICASONE FUROATE AND VILANTEROL TRIFENATATE SCH INH: 100; 25 POWDER RESPIRATORY (INHALATION) at 08:19

## 2019-01-25 RX ADMIN — DIGOXIN 1 MG: 125 TABLET ORAL at 13:00

## 2019-01-25 RX ADMIN — SODIUM BICARBONATE 1 ML: 84 INJECTION INTRAVENOUS at 11:08

## 2019-01-25 RX ADMIN — SODIUM CHLORIDE SCH MLS/HR: 234 INJECTION INTRAMUSCULAR; INTRAVENOUS; SUBCUTANEOUS at 11:10

## 2019-01-25 RX ADMIN — METOPROLOL SUCCINATE SCH MG: 100 TABLET, FILM COATED, EXTENDED RELEASE ORAL at 08:18

## 2019-01-25 RX ADMIN — SODIUM CHLORIDE SCH MLS/HR: 234 INJECTION INTRAMUSCULAR; INTRAVENOUS; SUBCUTANEOUS at 19:51

## 2019-01-25 RX ADMIN — DIGOXIN SCH MG: 125 TABLET ORAL at 13:00

## 2019-01-25 RX ADMIN — ASPIRIN SCH MG: 81 TABLET, COATED ORAL at 08:17

## 2019-01-25 RX ADMIN — METOPROLOL SUCCINATE 1 MG: 100 TABLET, EXTENDED RELEASE ORAL at 21:00

## 2019-01-25 NOTE — NUR
RN NOTE



1020- 2 D50 IV PUSH WERE GIVEN DURING THE RRT CODE FROM THE CRASH CARD. ORDERED BY DR. NIEVES.

## 2019-01-25 NOTE — NUR
RN NOTE



NOTIFIED DR. HARE, PT HAD 8 BEATS OF VTACH. ASSESSED PT, PT WAS NON-VERBAL AND ALTERED. 
UNABLE TO GET A BP READING.CHECKED PT'S BLOOD SUGAR WITH THE RESULT OF 13. NEW IV WAS PLACED 
ON THE RIGHT AC, AND PUSHED D50. NOTIFIED DR. HARE, DR. HARE CAME TOT HE BEDSIDE. 
RECHECKED BS AT 75 NOW. NEW ORDER TO GIVE D10. CALLED DISTRIBUTION AND WAITING FOR BAG. 



1015, STILL UNABLE TO GET A READING FOR THE BLOOD PRESSURE AND 02 SAT. 



1020, RRT CALLED. RT PLACED PT ON A NON-REBREATHER, NO 02 READING. RECHECKED PT'S BS UNABLE 
TO GET A READING. DR. NIEVES BY THE BEDSIDE, WITH NEW ORDERS PLACED. PT IS TO GO TO ICU. 
RECHECKED BS AGAIN, UNABLE TO GET A READING. PUSHED 2 MORE D50 FROM THE CRASH CART. 



1030, PT WENT DOWN TO ICU. REPORT GIVEN TO SAMAN SHIELDS.

## 2019-01-25 NOTE — CONS
Assessment/Plan


Assessment/Plan


Assessment/Plan (Daily)


1) Fall versus syncope


2) chronic afib now with RVR


3) LV dysfunction


4) no gross fluid overload


5) no ischemia in recent stress test


6) HTN





Plan:


1) continue cv meds


2) s/p ucnotroleld glucose 


3) s/p transient bradycardia - will stop dig as HR 40-50s, and will continue 


metoporlol - if further drop in HR, garza top metorprol


4/ On eliquis





Consultation Date/Type/Reason


Admit Date/Time


Jan 23, 2019 at 18:29


Initial Consult Date





Type of Consult


Cardiology


Date/Time of Note


DATE: 1/25/19 


TIME: 12:41





24 HR Interval Summary


Free Text/Dictation


The patient with no change -





Exam/Review of Systems


Vital Signs


Vitals





Vital Signs


  Date      Temp  Pulse  Resp  B/P (MAP)   Pulse Ox  O2          O2 Flow    FiO2


Time                                                 Delivery    Rate


   1/25/19           41


     11:26


   1/25/19  97.7           20      110/70        90  Nasal


     07:52                           (83)            Cannula


   1/23/19                                                             2.0


     19:31








Intake and Output





1/24/19 1/24/19 1/25/19





1515:00


23:00


07:00





IntakeIntake Total


400 ml


300 ml





BalanceBalance


400 ml


300 ml














Labs


Result Diagram:  


1/25/19 1043                                                                    


           1/25/19 1043





Results 24hrs





Laboratory Tests


Test
              1/25/19
05:32     1/25/19
10:06  1/25/19
10:13  1/25/19
10:25


Sodium Level              147  H


Potassium Level           5.2  H


Chloride Level            112  H


Carbon Dioxide             20  L


Level


Anion Gap                  15  H


Blood Urea                 73  H


Nitrogen


Creatinine               1.60  H


Est Glomerular       
            
                 
              



Filtrat


Rate
mL/min


Glucose Level               86


Calcium Level              9.3


Bedside Glucose                             13  *L           75    < 13  *L


Test
              1/25/19
10:28     1/25/19
10:41  1/25/19
10:43  1/25/19
10:44


Bedside Glucose    < 13  *L                                               15  *L


Blood Gas          
              Blood arterial
   
              



Specimen Source



Arterial Blood     
              1/25/2019
10:38:  
              



Date Drawn
                                  32 AM


Arterial Blood pH  
                    7.268  *L
  
              



(Temp
corrected)


Arterial Blood     
                      18.9  L
  
              



pCO2


(Temp
correct)


Arterial Blood     
                     169.8  H
  
              



pO2


(Temp
corrected)


Arterial Blood                             8.4  *L


HCO3


Arterial Blood                            -16.2  L


Base Excess


Arterial Blood     
                       98.6  
  
              



Oxygen
Saturation


Alex Test                        ACCEPTAB


Arterial Blood     
              Right Radial  
   
              



Gas Puncture
Site


Arterial           
                        0.3  
  
              



Blood
Carboxyhemo


globin


Arterial Blood                               0.2


Methemoglobin


Blood Gas A-a O2                          524.3  H


Differential


Oxyhemoglobin                               98.1


Percent


Blood Gas                                   37.0


Temperature


Blood Gas                         MASK - NRB


Modality


FiO2                                       100.0


Blood Gas          
              DR ROCHA  
     
              



Critical Value


Read
Back


Blood Gas                         TM


Notified Whom


Blood Gas          
              1/25/2019
10:48:  
              



Notified Time
                               33 AM


White Blood Count                                           9.1


Red Blood Count                                          3.57  #L


Hemoglobin                                               11.3  #L


Hematocrit                                                36.2  L


Mean Corpuscular                                         101.4  H


Volume


Mean Corpuscular                                           31.7


Hemoglobin


Mean Corpuscular   
              
                      31.2  L
  



Hemoglobin
Concen


t


Red Cell                                                  16.8  H


Distribution


Width


Platelet Count                                            100  #L


Mean Platelet                                             12.1  H


Volume


Immature                                                 3.600  H


Granulocytes %


Neutrophils %                                              75.1


Lymphocytes %                                              16.7


Monocytes %                                                 4.4


Eosinophils %                                               0.0


Basophils %                                                 0.2


Nucleated Red                                              8.6  H


Blood Cells %


Immature                                                 0.330  H


Granulocytes #


Neutrophils #                                               6.8


Lymphocytes #                                               1.5


Monocytes #                                                 0.4


Eosinophils #                                               0.0


Basophils #                                                 0.0


Nucleated Red                                              0.8  H


Blood Cells #


Sodium Level                                                141


Potassium Level                                            5.9  H


Chloride Level                                              106


Carbon Dioxide                                              14  L


Level


Anion Gap                                                   21  H


Blood Urea                                                  70  H


Nitrogen


Creatinine                                                1.85  H


Est Glomerular     
              
                   
            



Filtrat


Rate
mL/min


Glucose Level                                            468  #*H


Calcium Level                                              8.0  L


Test
              1/25/19
10:49     1/25/19
11:05  
              



Bedside Glucose            55  L            357  H














MEHNAZ RG MD              Jan 25, 2019 12:46

## 2019-01-25 NOTE — CONS
Assessment/Plan


Assessment/Plan


Hospital Course (Demo Recall)


72 y/o M with COPD, chronic atrial fibrillation, hypertension who was brought 


into the emergency room after he had a ground level fall at [facility]; FS was low in 


the field. Per chart he had no syncopal episode. Multiple low fingerstick 


readings, as low as 14. On examination patient has very cold extremities which 


could affect POC fingerstick reading. Spoke with ICU nurse, there is a 


discrepancy between FS readings from pulp of fingertip vs FS reading obtain from


AC fossa vein. For example at 2pm FS reading from fingertip was 50 while POC FS 


from AC vein was 253; at 5pm FS reading from fingertip was 55 and from left AC 


vein was 128. I discussed with nurse to check a serum glucose at the same time 


to confirm which of the two POC fingerstick readings is appropriate. 





Hypoglycemia


-POC fingerstick reading is affected by temperature, pending serum glucose to 


confirm FS reading. 


-Causes include drugs (insulin, sulfonylurea), endogeneous hyperinsulinism, end 


stage renal/liver failure, adrenal insufficiency and nonislet cell tumor


   Home medications reviewed, none cause hypoglycemia, some data on ACEI and 


beta blocker causing hypoglycemia but the data is of very low quality evidence. 


(Home medications: Spiriva, Eliquis, Diltiazem, Lisinopril, Metoprolol, Aspirin 


and Lasix)


   Check for sulfonylurea screen (serum), 8AM cortisol and TSH


   No history of cancer per chart


-When FS reading is <55 (depending on repeat serum glucose, FS might need to be 


obtained from AC vein), please send off the following BEFORE treating 


hypoglycemia


   1. Serum glucose


   2. Insulin


   3. Proinsulin


   4. C peptide


   5. Beta hydroxybutyrate








Will follow with you





Consultation Date/Type/Reason


Admit Date/Time


Jan 23, 2019 at 18:29


Date/Time of Note


DATE: 1/25/19 


TIME: 17:03





Hx of Present Illness


During examination, patient has waxing and waning mental status, he was able to 


provide answer to simple questions but unable to elaborate upon more detailed 


questioning. Chart reviewed, Mr Benedict is a 72 y/o M with COPD, chronic atrial 


fibrillation, hypertension who was brought into the emergency room after he had 


a ground level fall at [facility]; FS was low in the field. Per chart he had no 


syncopal episode.  He denied any associated chest pain or shortness of breath.  


Fingerstick readings were as low as 14. He lives alone by himself.


Constitutional:  No fever, No chills, No sweats. 





Eye:  No discharge. No icterus





ENMT: No decreased hearing, no ear pain





Respiratory:  shortness of breath, No cough or sputum production





Cardiovascular:  No chest pain, No palpitations. 





Gastrointestinal: No nausea, vomiting or diarrhea





Integumentary: No rash, No pruritus





Neurologic:  Drowsy.





Past Medical History


Medical History:  hypertension, other (see HPI)


Home Meds


Active Scripts


Metoprolol Succinate* (Toprol XL*) 100 Mg Tab.sr.24h, 200 MG PO BID for 30 Days


   Prov:LAURIE HARE MD         1/22/19


Diltiazem Hcl* (Cardizem CD*) 180 Mg Cap.sr.24h, 180 MG PO DAILY, #30 CAP


   Prov:LAURIE HARE MD         1/21/19


Furosemide* (Furosemide*) 20 Mg Tablet, 20 MG PO DAILY for 30 Days, TAB 3 


Refills


   Prov:EMERY RUSSELL         1/19/19


Fluticasone-Vilanterol (Breo Ellipta Inhaler) 100-25 Mcg/Actuation Aer.pow.ba, 1


INH INH DAILY, #1 3 Refills


   Prov:EMERY RUSSELL         1/19/19


Aspirin Delayed Release (Aspirin Delayed Release) 81 Mg Tablet.dr, 81 MG PO 


DAILY for 30 Days, 3 Refills


   Prov:EMERY RUSSELL         1/19/19


Lisinopril* (Lisinopril*) 5 Mg Tablet, 2.5 MG PO QHS for 30 Days, TAB 3 Refills


   1/2 tab daily


   Prov:EMERY RUSSELL         1/19/19


Apixaban* (Eliquis*) 5 Mg Tablet, 5 MG PO BID for 30 Days, TAB 3 Refills


   Prov:EMERY RUSSELL         1/19/19


Tiotropium Bromide* (Spiriva*) 18 Mcg Cap.w.dev, 1 INH INH DAILY, #1 3 Refills


   Prov:EMERY RUSSELL         1/19/19


Discontinued Reported Medications


Olmesartan-Amlodipine-HCTZ (Tribenzor) 40-10-25 Mg Tablet, 1 TAB PO DAILY, TAB


   1/15/19


Discontinued Scripts


Nicotine* (Nicotine* Patch) 21 mg/day Patch, 1 PATCH TRANSDERM DAILY for 7 Days


   Prov:LAURIE HARE MD         1/21/19


Metoprolol Succinate* (Toprol XL*) 50 Mg Tab.er.24h, 50 MG PO BID for 30 Days, 3


Refills


   Prov:PARMJIT RUSSELL'DEMARILY GOSIA         1/19/19


Medications





Current Medications


IV Flush (NS 3 ml) 3 ml PER PROTOCOL IV ;  Start 1/23/19 at 22:30


Ondansetron HCl (Zofran Tab) 4 mg Q6H  PRN PO nausea;  Start 1/23/19 at 22:30


Acetaminophen (Tylenol Tab) 650 mg Q6H  PRN PO fever Last administered on 


1/24/19at 14:52; Admin Dose 650 MG;  Start 1/23/19 at 22:30


Docusate Sodium (Colace) 100 mg Q12H  PRN PO CONSTIPATION;  Start 1/23/19 at 


22:30


Magnesium Hydroxide (Milk Of Mag) 30 ml DAILY  PRN PO indigestion;  Start 


1/23/19 at 22:30


Famotidine (Pepcid) 20 mg Q24H PO  Last administered on 1/24/19at 22:24; Admin 


Dose 20 MG;  Start 1/23/19 at 23:00


Apixaban (Eliquis) 5 mg BID PO  Last administered on 1/25/19at 08:18; Admin Dose


5 MG;  Start 1/23/19 at 23:00


Aspirin (Halfprin) 81 mg DAILY PO  Last administered on 1/25/19at 08:17; Admin 


Dose 81 MG;  Start 1/24/19 at 09:00


Fluticasone/ Vilanterol (Breo Ellipta 100-25 Mcg Inh) 1 inh DAILY INH  Last 


administered on 1/25/19at 08:19; Admin Dose 1 INH;  Start 1/24/19 at 09:00


Metoprolol Succinate (Toprol Xl) 200 mg BID PO  Last administered on 1/25/19at 


08:18; Admin Dose 200 MG;  Start 1/23/19 at 23:00


Tiotropium Bromide (Spiriva) 1 inh DAILY INH  Last administered on 1/24/19at 


13:44; Admin Dose 1 INH;  Start 1/24/19 at 09:00


Digoxin (Digoxin) 0.125 mg DAILY@13 PO  Last administered on 1/24/19at 13:42; 


Admin Dose 0.125 MG;  Start 1/24/19 at 13:00;  Status Hold


Dextrose 1,000 ml @  100 mls/hr Q10H IV  Last administered on 1/25/19at 11:10; 


Admin Dose 70 MLS/HR;  Start 1/25/19 at 10:30


Allergies:  


Coded Allergies:  


     No Known Drug Allergies (Verified  Allergy, Unknown, 1/23/19)





Past Surgical History


Past Surgical Hx:  no surgical history





Family History


Significant Family History:  other (no family history of diabetes mellitus)





Social History


Smoking Status:  Current some day smoker





Exam/Review of Systems


Exam


Vitals





Vital Signs


  Date      Temp  Pulse  Resp  B/P (MAP)   Pulse Ox  O2          O2 Flow    FiO2


Time                                                 Delivery    Rate


   1/25/19           53    19      112/76        92  Non


     16:00                           (88)            Rebreather


   1/25/19  97.4


     11:30


   1/23/19                                                             2.0


     19:31








Intake and Output





1/24/19 1/24/19 1/25/19





1515:00


23:00


07:00





IntakeIntake Total


400 ml


300 ml





BalanceBalance


400 ml


300 ml











Exam


General: Appears frail.  Skin appropriate for ethnicity





Eye:  Extraocular movements are intact, Normal conjunctiva. 





HENT:  Normocephalic, atraumatic. 





Respiratory:  Respirations are non-labored, Breath sounds are equal, Symmetrical


chest wall expansion. 





Cardiovascular:  S1, S2. No murmur. No LE edema. Bradycardia





Gastrointestinal:  Soft, Non-tender, Non-distended, Normal bowel sounds. 





Integumentary:  All four extremities are cold to touch.





Neurologic:  waxing and waning mental status, able to answer simple questions 


and follows simple commands





Results


Result Diagram:  


1/25/19 1043                                                                    


           1/25/19 1043





Results 24hrs





Laboratory Tests


Test
              1/25/19
05:32     1/25/19
10:06  1/25/19
10:13  1/25/19
10:25


Sodium Level              147  H


Potassium Level           5.2  H


Chloride Level            112  H


Carbon Dioxide             20  L


Level


Anion Gap                  15  H


Blood Urea                 73  H


Nitrogen


Creatinine               1.60  H


Est Glomerular       
            
                 
              



Filtrat


Rate
mL/min


Glucose Level               86


Calcium Level              9.3


Bedside Glucose                             13  *L           75    < 13  *L


Test
              1/25/19
10:28     1/25/19
10:41  1/25/19
10:43  1/25/19
10:44


Bedside Glucose    < 13  *L                                               15  *L


Blood Gas          
              Blood arterial
   
              



Specimen Source



Arterial Blood     
              1/25/2019
10:38:  
              



Date Drawn
                                  32 AM


Arterial Blood pH  
                    7.268  *L
  
              



(Temp
corrected)


Arterial Blood     
                      18.9  L
  
              



pCO2


(Temp
correct)


Arterial Blood     
                     169.8  H
  
              



pO2


(Temp
corrected)


Arterial Blood                             8.4  *L


HCO3


Arterial Blood                            -16.2  L


Base Excess


Arterial Blood     
                       98.6  
  
              



Oxygen
Saturation


Alex Test                        ACCEPTAB


Arterial Blood     
              Right Radial  
   
              



Gas Puncture
Site


Arterial           
                        0.3  
  
              



Blood
Carboxyhemo


globin


Arterial Blood                               0.2


Methemoglobin


Blood Gas A-a O2                          524.3  H


Differential


Oxyhemoglobin                               98.1


Percent


Blood Gas                                   37.0


Temperature


Blood Gas                         MASK - NRB


Modality


FiO2                                       100.0


Blood Gas          
              DR ROCHA  
     
              



Critical Value


Read
Back


Blood Gas                         TM


Notified Whom


Blood Gas          
              1/25/2019
10:48:  
              



Notified Time
                               33 AM


White Blood Count                                           9.1


Red Blood Count                                          3.57  #L


Hemoglobin                                               11.3  #L


Hematocrit                                                36.2  L


Mean Corpuscular                                         101.4  H


Volume


Mean Corpuscular                                           31.7


Hemoglobin


Mean Corpuscular   
              
                      31.2  L
  



Hemoglobin
Concen


t


Red Cell                                                  16.8  H


Distribution


Width


Platelet Count                                            100  #L


Mean Platelet                                             12.1  H


Volume


Immature                                                 3.600  H


Granulocytes %


Neutrophils %                                              75.1


Lymphocytes %                                              16.7


Monocytes %                                                 4.4


Eosinophils %                                               0.0


Basophils %                                                 0.2


Nucleated Red                                              8.6  H


Blood Cells %


Immature                                                 0.330  H


Granulocytes #


Neutrophils #                                               6.8


Lymphocytes #                                               1.5


Monocytes #                                                 0.4


Eosinophils #                                               0.0


Basophils #                                                 0.0


Nucleated Red                                              0.8  H


Blood Cells #


Sodium Level                                                141


Potassium Level                                            5.9  H


Chloride Level                                              106


Carbon Dioxide                                              14  L


Level


Anion Gap                                                   21  H


Blood Urea                                                  70  H


Nitrogen


Creatinine                                                1.85  H


Est Glomerular     
              
                   
            



Filtrat


Rate
mL/min


Glucose Level                                            468  #*H


Calcium Level                                              8.0  L


Test
              1/25/19
10:49     1/25/19
11:05  1/25/19
12:28  1/25/19
14:40


Bedside Glucose            55  L            357  H         235  H         240  H








Medications


Medication





Current Medications


IV Flush (NS 3 ml) 3 ml PER PROTOCOL IV ;  Start 1/23/19 at 22:30


Ondansetron HCl (Zofran Tab) 4 mg Q6H  PRN PO nausea;  Start 1/23/19 at 22:30


Acetaminophen (Tylenol Tab) 650 mg Q6H  PRN PO fever Last administered on 


1/24/19at 14:52; Admin Dose 650 MG;  Start 1/23/19 at 22:30


Docusate Sodium (Colace) 100 mg Q12H  PRN PO CONSTIPATION;  Start 1/23/19 at 


22:30


Magnesium Hydroxide (Milk Of Mag) 30 ml DAILY  PRN PO indigestion;  Start 


1/23/19 at 22:30


Famotidine (Pepcid) 20 mg Q24H PO  Last administered on 1/24/19at 22:24; Admin 


Dose 20 MG;  Start 1/23/19 at 23:00


Apixaban (Eliquis) 5 mg BID PO  Last administered on 1/25/19at 08:18; Admin Dose


5 MG;  Start 1/23/19 at 23:00


Aspirin (Halfprin) 81 mg DAILY PO  Last administered on 1/25/19at 08:17; Admin 


Dose 81 MG;  Start 1/24/19 at 09:00


Fluticasone/ Vilanterol (Breo Ellipta 100-25 Mcg Inh) 1 inh DAILY INH  Last 


administered on 1/25/19at 08:19; Admin Dose 1 INH;  Start 1/24/19 at 09:00


Metoprolol Succinate (Toprol Xl) 200 mg BID PO  Last administered on 1/25/19at 


08:18; Admin Dose 200 MG;  Start 1/23/19 at 23:00


Tiotropium Bromide (Spiriva) 1 inh DAILY INH  Last administered on 1/24/19at 


13:44; Admin Dose 1 INH;  Start 1/24/19 at 09:00


Digoxin (Digoxin) 0.125 mg DAILY@13 PO  Last administered on 1/24/19at 13:42; 


Admin Dose 0.125 MG;  Start 1/24/19 at 13:00;  Status Hold


Dextrose 1,000 ml @  100 mls/hr Q10H IV  Last administered on 1/25/19at 11:10; 


Admin Dose 70 MLS/HR;  Start 1/25/19 at 10:30











ROXIE LLOYD MD               Jan 25, 2019 17:04

## 2019-01-25 NOTE — NUR
DR. SEUN RG UPDATED RE: BLOOD PRESSURE AND HR. STATES GIVE LOPRESSOR 5 MG IVP Q 6 HOURS. 
ALSO LAB. RESULTS RELAYED

## 2019-01-25 NOTE — NUR
SW: ATTEMPTED CONTACT



SW attempted to meet with this patient regarding patient verbalizing concerns about his car 
and keys. However, patient was not altered at this time. RN at bedside and a rapid response 
was called.  will reattempt to meet with patient at a later time.

## 2019-01-25 NOTE — CONS
DATE OF ADMISSION: 01/25/2019

DATE OF CONSULTATION:  01/25/2019

 

 

 

REASON FOR CONSULTATION:  Altered mental status, respiratory distress.

 

Thank you, Dr. Schmitt, for this consultation.

 

HISTORY OF PRESENT ILLNESS:  This is a 73-year-old gentleman originally admitted following syncopal e
pisode, found to have significant bradycardia and hypotension.  This morning he was more hypotensive,
 more altered with bradycardia in the 40s and respiratory distress requiring emergent transfer to [facility] care unit.  His blood sugar was noted to be under 30 requiring D50 pushes.  Upon transfer here
, he was also noted to be markedly hypoglycemic and with significant metabolic acidosis.  Upon correc
tion of hypoglycemia and intravenous bicarbonate his neurological respiratory status had improved.

 

PAST MEDICAL HISTORY:

1.  Diabetes mellitus.

2.  Chronic atrial fibrillation.

3.  Chronic obstructive pulmonary disease.

4.  Renal insufficiency.

 

MEDICATIONS:  Per chart.

 

ALLERGIES:  None.

 

SOCIAL HISTORY:  He has an extensive tobacco history.  No history of drug abuse.

 

FAMILY HISTORY:  Noncontributory.

 

SYSTEMS REVIEW:  A 12-point review of systems currently unable to perform.

 

PHYSICAL EXAMINATION:

GENERAL:  Elderly-appearing gentleman, opens eyes to voice.

VITAL SIGNS:  Currently afebrile, pulse is now 50, blood pressure 110/70, O2 saturation 90% on nasal 
cannula.

NECK:  Supple.  JVD.

CARDIAC:  S1, S2, no added sounds or murmurs.

CHEST:  Diminished air entry bilaterally.

ABDOMEN:  Soft, nontender.  No guarding or rebound.

EXTREMITIES:  No cyanosis, clubbing or edema.

NEUROLOGIC:  Generalized weakness, but no focal deficits.

 

DIAGNOSTIC DATA:  Chest x-ray shows no infiltrates or effusions.  Blood glucose has been fluctuating 
per chart.  BUN 70, creatinine 1.85.  Arterial blood gas pH 7.26, pCO2 of 18, pO2 of 169, bicarbonate
 was 8.4.

 

IMPRESSION AND PLAN:

1.  Hypoglycemia, questionable insulinoma.

2.  Atrial fibrillation, history of atrial fibrillation, now with significant bradycardia, possibly s
econdary to medications.

3.  Encephalopathy, likely secondary to altered blood glucose.

4.  Marked metabolic acidosis, possibly secondary to acute renal failure.

 

The patient will require:

1.  Renal evaluation.  Consider renal ultrasound and consultation.

2.  Endocrinology evaluation for recurrent hypoglycemia.

3.  Consider broad-spectrum antibiotic coverage for possible underlying sepsis.

4.  Repeat arterial blood gas to monitor metabolic acidosis.

 

 

Dictated By: PAM ROCHA MD

 

SV/NTS

DD:    01/25/2019 13:38:03

DT:    01/25/2019 14:20:46

Conf#: [id number]

DID#:  [id number]

CC: GRIS SIMPSON MD;*End*

## 2019-01-25 NOTE — EN
Date/Time of Note


Date/Time of Note


DATE: 1/25/19 


TIME: 10:39





Event Note Medicine


Medicine Event Note


Rapid response called around 10:15 this morning.





Briefly, patient is a 73M found down yesterday with hypoglycemia. Today, RRT 


called after patient became unresponsive after 8 beats of VT. Nurse checked BS 


and it was 13, after 1 amp D50 was 75. 


When I arrived patient had Kussmaul respirations and was minimally responsive. 


Saturating mid 90s on oxygen facemask. Eyes open, not tracking, not following 


commands. Palpable femoral pulse. /55. 12 lead EKG showed no P waves, 


monty ventricular rate to 50s, wide QRS. 


Normal heart sounds with no murmurs. Lungs clear bilaterally. 


Phlebotomy attempted to get labs prior to transfer but unsuccessful. 


Another amp D50 was given (2), and blood sugar dropped undetectable again. Two 


more amps D50 given (4). Patient transferred to . 





I spent more than 38 minutes in the care of this patient.











ZEYAD NIEVES MD              Jan 25, 2019 10:44

## 2019-01-25 NOTE — NUR
SW: CONSULT



SW reviewed patient's chart, and per transfer documents patient was transferred to the 
hospital from [facility] located at [address] [place] [address]. SW contacted 
[facility] ([phone]) and spoke with Kushal, manager on duty. Without disclosing any 
information about patient, JENNIFER introduced self and informed him of reason for this call. 
Inquired if anyone has turned in any car keys. Kushal states that he checks the vault every 
day, and nobody has turned in any keys. SS will reattempt to interview patient for more 
information when patient more oriented and interviewable.

## 2019-01-25 NOTE — NUR
REPORT RECEIVED TO OUTGOING RN. CARE ASSUMED, EMAR/ ORDER HX. REVIEWED. TELEMETRY W/ EPISODE 
OF V.TACH. RATES > 120 PT. REASSESSED, DENIES CP. /79 LABS. REVIEWED. MAG/TROPONIN TO 
BE ORDERED AND DR. RG TO BE NOTIFIED.

## 2019-01-25 NOTE — NUR
1100: Patient arrived from [facility], hypoglycemic, peripheral blood sugars 30's. MD Ojeda at 
bedside. Patient responsive and on non-rebreather at this time. D10 started at 100 mls/hr 
according to MD Ram. Patient resting comfortably in bed, AO to self. REGGIE MORRIS.  

-------------------------------------------------------------------------------

Addendum: 01/25/19 at 1952 by DEVIKA OLSON RN

-------------------------------------------------------------------------------

EOSS: ABG resulted and patient given 2 amps of bicarb per MD Ojeda. MD Jovel at bedside 
and made aware of non-correlation peripheral blood sugars to serum blood sugars. MD Rodriguez 
arrived at bedside and also made aware. Digital blood sugar done with corresponding AC and 
serum glucose. Serum and AC glucose correlating. MD Rodriguez aware. Patient continues to be AO2, 
resting comfortably in bed. Patient has d10@100 mls/ hour. Patients sister called and 
updated on patients status. No other incidents occurred during shift. Report given to PM RN. 
INDER.

## 2019-01-25 NOTE — PN
DATE:  01/25/2019

 

 

SUBJECTIVE:  The patient is somewhat confused and poorly responsive.  

 

OBJECTIVE:  

VITAL SIGNS:  Stable.  He is afebrile.

NECK:  Supple.

LUNGS:  Bilateral rhonchi.

CARDIAC:  Regular rate and rhythm.  No murmurs, rubs or gallops.

ABDOMEN:  Soft, nontender, nondistended.  Normoactive bowel sounds.

EXTREMITIES:  No clubbing, cyanosis, or edema.

NEUROLOGIC:  Patient moves all extremities.

 

ASSESSMENT:

1.  A 73-year-old male with a ground level fall.

2.  Hypoglycemic reaction.  Blood sugars were extremely low in the emergency room.  We need to rule o
ut any underlying disease such as insulinoma.

3.  Dehydration.

4.  Acute kidney injury, improving with intravneous fluid hydration.

5.  Chronic obstructive pulmonary disease.

6.  Chronic atrial fibrillation

7.  Hypertension.

 

PLAN:

1.  Continue IV fluid hydration with D5 normal saline.

2.  Monitor renal function.

3.  Cardiology followup

4.  Endocrine consultation was requested.

 

 

Dictated By: LAURIE LEVINE/NTS

DD:    01/25/2019 09:58:34

DT:    01/25/2019 11:37:07

Conf#: [id number]

DID#:  [id number]

CC: GRIS SIMPSON MD;*EndCC*

## 2019-01-25 NOTE — NUR
RN NOTE



PT IS AX2-3, ABLE TO VERBALIZE NEEDS, AND ANXIOUS ASKING ABOUT HIS CAR AND HIS SHAZIA. BED 
ALARM ON, CALL LIGHT WITHIN REACH.

## 2019-01-25 NOTE — NUR
EOSS



VSS, SR on monitor, no SOB, no complaints of pain. Patient incontinent, bonny-care PRN. D5-NS 
running at 100ml/hr. Per patient, he lost his car keys prior to being brought to [facility],  consult is in. Hourly rounding done, encouraged pt. to reposition self in 
bed frequently. Patient stable at this time, will endorse to oncoming shift.

## 2019-01-25 NOTE — NUR
PT: Urgent PT eval orders received while pt was on telemetry unit this AM, however pt 
subsequently is s/p RRT and transferred to ICU for further care. PT department will require 
new MD orders to initiate treatment as deemed appropriate due to transfer to higher level of 
care/status change.

## 2019-01-26 VITALS — RESPIRATION RATE: 36 BRPM | HEART RATE: 84 BPM

## 2019-01-26 VITALS — SYSTOLIC BLOOD PRESSURE: 93 MMHG | DIASTOLIC BLOOD PRESSURE: 68 MMHG

## 2019-01-26 VITALS — HEART RATE: 69 BPM | SYSTOLIC BLOOD PRESSURE: 128 MMHG | RESPIRATION RATE: 19 BRPM | DIASTOLIC BLOOD PRESSURE: 71 MMHG

## 2019-01-26 VITALS — HEART RATE: 86 BPM | SYSTOLIC BLOOD PRESSURE: 121 MMHG | DIASTOLIC BLOOD PRESSURE: 91 MMHG | RESPIRATION RATE: 23 BRPM

## 2019-01-26 VITALS — DIASTOLIC BLOOD PRESSURE: 97 MMHG | RESPIRATION RATE: 17 BRPM | SYSTOLIC BLOOD PRESSURE: 117 MMHG

## 2019-01-26 VITALS — HEART RATE: 72 BPM | RESPIRATION RATE: 20 BRPM | DIASTOLIC BLOOD PRESSURE: 92 MMHG | SYSTOLIC BLOOD PRESSURE: 116 MMHG

## 2019-01-26 VITALS — RESPIRATION RATE: 26 BRPM | SYSTOLIC BLOOD PRESSURE: 115 MMHG | HEART RATE: 95 BPM | DIASTOLIC BLOOD PRESSURE: 72 MMHG

## 2019-01-26 VITALS — DIASTOLIC BLOOD PRESSURE: 78 MMHG | HEART RATE: 95 BPM | SYSTOLIC BLOOD PRESSURE: 92 MMHG | RESPIRATION RATE: 30 BRPM

## 2019-01-26 VITALS — SYSTOLIC BLOOD PRESSURE: 89 MMHG | HEART RATE: 90 BPM | DIASTOLIC BLOOD PRESSURE: 78 MMHG | RESPIRATION RATE: 44 BRPM

## 2019-01-26 VITALS — DIASTOLIC BLOOD PRESSURE: 60 MMHG | SYSTOLIC BLOOD PRESSURE: 76 MMHG | RESPIRATION RATE: 16 BRPM | HEART RATE: 98 BPM

## 2019-01-26 VITALS — HEART RATE: 86 BPM | SYSTOLIC BLOOD PRESSURE: 129 MMHG | RESPIRATION RATE: 19 BRPM | DIASTOLIC BLOOD PRESSURE: 95 MMHG

## 2019-01-26 VITALS — RESPIRATION RATE: 40 BRPM | HEART RATE: 102 BPM | SYSTOLIC BLOOD PRESSURE: 83 MMHG | DIASTOLIC BLOOD PRESSURE: 67 MMHG

## 2019-01-26 VITALS — DIASTOLIC BLOOD PRESSURE: 92 MMHG | SYSTOLIC BLOOD PRESSURE: 109 MMHG | RESPIRATION RATE: 36 BRPM | HEART RATE: 101 BPM

## 2019-01-26 VITALS — RESPIRATION RATE: 23 BRPM | DIASTOLIC BLOOD PRESSURE: 65 MMHG | HEART RATE: 84 BPM | SYSTOLIC BLOOD PRESSURE: 98 MMHG

## 2019-01-26 VITALS — RESPIRATION RATE: 40 BRPM | SYSTOLIC BLOOD PRESSURE: 104 MMHG | DIASTOLIC BLOOD PRESSURE: 74 MMHG | HEART RATE: 98 BPM

## 2019-01-26 VITALS — SYSTOLIC BLOOD PRESSURE: 125 MMHG | HEART RATE: 84 BPM | RESPIRATION RATE: 18 BRPM | DIASTOLIC BLOOD PRESSURE: 74 MMHG

## 2019-01-26 VITALS — HEART RATE: 97 BPM | SYSTOLIC BLOOD PRESSURE: 121 MMHG | DIASTOLIC BLOOD PRESSURE: 78 MMHG | RESPIRATION RATE: 12 BRPM

## 2019-01-26 VITALS — RESPIRATION RATE: 20 BRPM | DIASTOLIC BLOOD PRESSURE: 86 MMHG | SYSTOLIC BLOOD PRESSURE: 111 MMHG | HEART RATE: 80 BPM

## 2019-01-26 VITALS — HEART RATE: 80 BPM | RESPIRATION RATE: 23 BRPM | DIASTOLIC BLOOD PRESSURE: 93 MMHG | SYSTOLIC BLOOD PRESSURE: 118 MMHG

## 2019-01-26 VITALS — SYSTOLIC BLOOD PRESSURE: 133 MMHG | RESPIRATION RATE: 34 BRPM | HEART RATE: 104 BPM | DIASTOLIC BLOOD PRESSURE: 85 MMHG

## 2019-01-26 VITALS — HEART RATE: 98 BPM | RESPIRATION RATE: 38 BRPM

## 2019-01-26 VITALS — RESPIRATION RATE: 24 BRPM | DIASTOLIC BLOOD PRESSURE: 95 MMHG | SYSTOLIC BLOOD PRESSURE: 126 MMHG | HEART RATE: 99 BPM

## 2019-01-26 VITALS — HEART RATE: 75 BPM | SYSTOLIC BLOOD PRESSURE: 109 MMHG | DIASTOLIC BLOOD PRESSURE: 79 MMHG | RESPIRATION RATE: 19 BRPM

## 2019-01-26 VITALS — DIASTOLIC BLOOD PRESSURE: 82 MMHG | SYSTOLIC BLOOD PRESSURE: 109 MMHG | RESPIRATION RATE: 31 BRPM | HEART RATE: 99 BPM

## 2019-01-26 VITALS — HEART RATE: 87 BPM | DIASTOLIC BLOOD PRESSURE: 48 MMHG | RESPIRATION RATE: 20 BRPM | SYSTOLIC BLOOD PRESSURE: 94 MMHG

## 2019-01-26 VITALS — RESPIRATION RATE: 23 BRPM | DIASTOLIC BLOOD PRESSURE: 74 MMHG | HEART RATE: 103 BPM | SYSTOLIC BLOOD PRESSURE: 115 MMHG

## 2019-01-26 VITALS — RESPIRATION RATE: 35 BRPM | SYSTOLIC BLOOD PRESSURE: 74 MMHG | DIASTOLIC BLOOD PRESSURE: 63 MMHG | HEART RATE: 85 BPM

## 2019-01-26 VITALS — DIASTOLIC BLOOD PRESSURE: 104 MMHG | HEART RATE: 108 BPM | RESPIRATION RATE: 25 BRPM | SYSTOLIC BLOOD PRESSURE: 122 MMHG

## 2019-01-26 VITALS — SYSTOLIC BLOOD PRESSURE: 79 MMHG | HEART RATE: 90 BPM | DIASTOLIC BLOOD PRESSURE: 61 MMHG | RESPIRATION RATE: 16 BRPM

## 2019-01-26 VITALS — HEART RATE: 91 BPM | RESPIRATION RATE: 19 BRPM | DIASTOLIC BLOOD PRESSURE: 87 MMHG | SYSTOLIC BLOOD PRESSURE: 138 MMHG

## 2019-01-26 LAB
ALLEN TEST: (no result)
ANION GAP: 11 (ref 5–13)
ARTERIAL BASE EXCESS: -10.3 MMOL/L (ref -3–3)
ARTERIAL BLOOD GAS OXYGEN SAT: 87.5 MMHG (ref 95–100)
ARTERIAL COHB: 0.1 % (ref 0–3)
ARTERIAL FRACTION OF OXYHGB: 87.3 % (ref 93–99)
ARTERIAL HCO3: 11 MMOL/L (ref 22–26)
ARTERIAL METHB: 0.1 % (ref 0–1.5)
ARTERIAL PCO2: 17.1 MMHG (ref 35–45)
BLOOD GAS IEPAP: (no result)
BLOOD GAS PS: 10
BLOOD UREA NITROGEN: 68 MG/DL (ref 7–20)
CALCIUM: 9 MG/DL (ref 8.4–10.2)
CARBON DIOXIDE: 26 MMOL/L (ref 21–31)
CHLORIDE: 109 MMOL/L (ref 97–110)
CREATININE: 1.51 MG/DL (ref 0.61–1.24)
FIO2: 21 %
GLUCOSE: 113 MG/DL (ref 70–220)
HEMATOCRIT: 38.8 % (ref 42–52)
HEMOGLOBIN: 12.6 G/DL (ref 14–18)
INR: 4.01
MODE: (no result)
O2 A-A PPRESDIFF RESPIRATORY: 73.7 MMHG (ref 7–24)
PARTIAL THROMBOPLASTIN TIME: 35.8 SEC (ref 23–35)
POTASSIUM: 4 MMOL/L (ref 3.5–5.1)
PROSTATE SPECIFIC ANTIGEN: > 1000 NG/ML (ref 0–4)
PROTIME: 39 SEC (ref 11.9–14.9)
PT RATIO: 3
SODIUM: 146 MMOL/L (ref 135–144)
THYROID STIMULATING HORMONE: 5.09 MIU/L (ref 0.47–4.68)

## 2019-01-26 PROCEDURE — B548ZZA ULTRASONOGRAPHY OF SUPERIOR VENA CAVA, GUIDANCE: ICD-10-PCS

## 2019-01-26 PROCEDURE — 02HV33Z INSERTION OF INFUSION DEVICE INTO SUPERIOR VENA CAVA, PERCUTANEOUS APPROACH: ICD-10-PCS

## 2019-01-26 RX ADMIN — ASPIRIN 1 MG: 81 TABLET, COATED ORAL at 08:35

## 2019-01-26 RX ADMIN — METOPROLOL TARTRATE 1 MG: 5 INJECTION, SOLUTION INTRAVENOUS at 06:34

## 2019-01-26 RX ADMIN — METOPROLOL TARTRATE 1 MG: 5 INJECTION, SOLUTION INTRAVENOUS at 18:00

## 2019-01-26 RX ADMIN — METOPROLOL TARTRATE SCH MG: 5 INJECTION, SOLUTION INTRAVENOUS at 06:34

## 2019-01-26 RX ADMIN — FAMOTIDINE SCH MG: 20 TABLET ORAL at 22:14

## 2019-01-26 RX ADMIN — METOPROLOL TARTRATE SCH MG: 5 INJECTION, SOLUTION INTRAVENOUS at 18:00

## 2019-01-26 RX ADMIN — TIOTROPIUM BROMIDE 1 INH: 18 CAPSULE ORAL; RESPIRATORY (INHALATION) at 08:34

## 2019-01-26 RX ADMIN — SODIUM CHLORIDE SCH MLS/HR: 234 INJECTION INTRAMUSCULAR; INTRAVENOUS; SUBCUTANEOUS at 14:56

## 2019-01-26 RX ADMIN — ASPIRIN SCH MG: 81 TABLET, COATED ORAL at 08:35

## 2019-01-26 RX ADMIN — FLUTICASONE FUROATE AND VILANTEROL TRIFENATATE 1 INH: 100; 25 POWDER RESPIRATORY (INHALATION) at 08:34

## 2019-01-26 RX ADMIN — SODIUM CHLORIDE SCH MLS/HR: 234 INJECTION INTRAMUSCULAR; INTRAVENOUS; SUBCUTANEOUS at 05:43

## 2019-01-26 RX ADMIN — METOPROLOL TARTRATE SCH MG: 5 INJECTION, SOLUTION INTRAVENOUS at 00:23

## 2019-01-26 RX ADMIN — APIXABAN SCH MG: 5 TABLET, FILM COATED ORAL at 08:35

## 2019-01-26 RX ADMIN — METOPROLOL TARTRATE SCH MG: 5 INJECTION, SOLUTION INTRAVENOUS at 12:00

## 2019-01-26 RX ADMIN — FLUTICASONE FUROATE AND VILANTEROL TRIFENATATE SCH INH: 100; 25 POWDER RESPIRATORY (INHALATION) at 08:34

## 2019-01-26 RX ADMIN — SODIUM CHLORIDE 1 MLS/HR: 234 INJECTION INTRAMUSCULAR; INTRAVENOUS; SUBCUTANEOUS at 05:43

## 2019-01-26 RX ADMIN — SODIUM CHLORIDE 1 ML: 9 INJECTION, SOLUTION INTRAMUSCULAR; INTRAVENOUS; SUBCUTANEOUS at 20:30

## 2019-01-26 RX ADMIN — METOPROLOL TARTRATE 1 MG: 5 INJECTION, SOLUTION INTRAVENOUS at 00:23

## 2019-01-26 RX ADMIN — APIXABAN 1 MG: 5 TABLET, FILM COATED ORAL at 08:35

## 2019-01-26 RX ADMIN — FAMOTIDINE 1 MG: 20 TABLET ORAL at 22:14

## 2019-01-26 RX ADMIN — PHYTONADIONE 1 MG: 10 INJECTION, EMULSION INTRAMUSCULAR; INTRAVENOUS; SUBCUTANEOUS at 18:04

## 2019-01-26 RX ADMIN — SODIUM CHLORIDE 1 MLS/HR: 234 INJECTION INTRAMUSCULAR; INTRAVENOUS; SUBCUTANEOUS at 14:56

## 2019-01-26 RX ADMIN — METOPROLOL SUCCINATE SCH MG: 100 TABLET, FILM COATED, EXTENDED RELEASE ORAL at 08:35

## 2019-01-26 RX ADMIN — LIDOCAINE HYDROCHLORIDE 1 MLS/HR: 10 INJECTION, SOLUTION EPIDURAL; INFILTRATION; INTRACAUDAL; PERINEURAL at 20:04

## 2019-01-26 RX ADMIN — METOPROLOL SUCCINATE 1 MG: 100 TABLET, EXTENDED RELEASE ORAL at 08:35

## 2019-01-26 RX ADMIN — METOPROLOL TARTRATE 1 MG: 5 INJECTION, SOLUTION INTRAVENOUS at 12:00

## 2019-01-26 NOTE — PN
DATE:  01/26/2019

 

 

SUBJECTIVE:  The patient was transferred to ICU after he had altered mental status and recurrent hypo
glycemic reaction.  The patient is alert and oriented.  He has no complaints.  

 

OBJECTIVE:

VITAL SIGNS:  Stable.  He is afebrile.

LUNGS:  Mild rhonchi.

HEART:  Regular rate and rhythm.  No murmurs, rubs or gallops.

ABDOMEN:  Soft, nontender, nondistended, normoactive bowel sounds.

EXTREMITIES:  No clubbing, cyanosis, or edema.

NEUROLOGIC:  Grossly nonfocal except slow mentation.

 

LABORATORY DATA:  Sodium 146, potassium 4, chloride 109, bicarbonate 26, BUN 68, creatinine now 1.51.
  Last blood sugar was 68.

 

ASSESSMENT:

1.  A 73-year-old male with recurrent hypoglycemic reaction.  We need to rule out insulinoma or other
 causes for persistent hypoglycemia.  Blood sugar remains low despite D10 infusion.

2.  Chronic obstructive pulmonary disease.

3.  Chronic atrial fibrillation.

4.  Acute kidney injury, slowly improving.

5.  Dehydration, resolved.  

 

PLAN:

1.  Transfer to telemetry.  

2.  Continue D10 drip.

3.  Physical therapy.

4.  Monitor kidney function closely.

5.  Monitor blood sugar every 4 hours.  

6.  Endocrine followup is appreciated.

7.  Insulin and proinsulin levels have been ordered and the result is pending.

 

 

Dictated By: LAURIE LEVINE/ELADIO

DD:    01/26/2019 08:32:38

DT:    01/26/2019 09:03:37

Conf#: [id number]

DID#:  [id number]

CC: GRIS SIMPSON MD;*EndCC*

## 2019-01-26 NOTE — CONS
Assessment/Plan


Assessment/Plan


Assessment/Plan (Daily)


1) Fall versus syncope


2) chronic afib - s/p RVR and last night bradycardia


3) LV dysfunction


4) no gross fluid overload


5) no ischemia in recent stress test


6) HTN > hypotension yesterday


7) s/p hypoglycemia





Plan:


1) continue cv meds - 


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


3) s/p transient bradycardia - off dig yesterday due to bradycardia and will 


continue metoporlol - if further drop in HR, will stop metorprol


4/ On eliquis





Consultation Date/Type/Reason


Admit Date/Time


Jan 25, 2019 at 13:18


Initial Consult Date





Type of Consult


Cardiology


Date/Time of Note


DATE: 1/26/19 


TIME: 08:44





24 HR Interval Summary


Free Text/Dictation


The patient with no cahnge





Exam/Review of Systems


Vital Signs


Vitals





Vital Signs


  Date      Temp  Pulse  Resp  B/P (MAP)   Pulse Ox  O2          O2 Flow    FiO2


Time                                                 Delivery    Rate


   1/26/19           91    19      138/87            Venturi


     07:00                          (104)            Mask


   1/26/19  97.6


     04:00


   1/26/19                                       98                           50


     02:46


   1/25/19                                                            15.0


     23:30








Intake and Output





1/25/19 1/25/19 1/26/19





1414:59


22:59


06:59





IntakeIntake Total


400 ml


800 ml


800 ml





OutputOutput Total


160 ml


175 ml





BalanceBalance


400 ml


640 ml


625 ml














Labs


Result Diagram:  


1/25/19 1043                                                                    


           1/26/19 0437





Results 24hrs





Laboratory Tests


Test
                 1/25/19
10:06  1/25/19
10:13  1/25/19
10:25  1/25/19
10:28


Bedside Glucose              13  *L           75    < 13  *L       < 13  *L


Test
                 1/25/19
10:41  1/25/19
10:43  1/25/19
10:44  1/25/19
10:49


Blood Gas          Blood arterial
   
              
              



Specimen Source



Arterial Blood     1/25/2019
10:38:  
              
              



Date Drawn
                   32 AM


Arterial Blood pH        7.268  *L
  
              
              



(Temp
corrected)


Arterial Blood             18.9  L
  
              
              



pCO2


(Temp
correct)


Arterial Blood            169.8  H
  
              
              



pO2


(Temp
corrected)


Arterial Blood              8.4  *L


HCO3


Arterial Blood             -16.2  L


Base Excess


Arterial Blood              98.6  
  
              
              



Oxygen
Saturation


Alex Test         ACCEPTAB


Arterial Blood     Right Radial  
   
              
              



Gas Puncture
Site


Arterial                     0.3  
  
              
              



Blood
Carboxyhemo


globin


Arterial Blood                0.2


Methemoglobin


Blood Gas A-a O2           524.3  H


Differential


Oxyhemoglobin                98.1


Percent


Blood Gas                    37.0


Temperature


Blood Gas          MASK - NRB


Modality


FiO2                        100.0


Blood Gas          DR ROCHA  
     
              
              



Critical Value


Read
Back


Blood Gas          TM


Notified Whom


Blood Gas          1/25/2019
10:48:  
              
              



Notified Time
                33 AM


White Blood Count                            9.1


Red Blood Count                           3.57  #L


Hemoglobin                                11.3  #L


Hematocrit                                 36.2  L


Mean Corpuscular                          101.4  H


Volume


Mean Corpuscular                            31.7


Hemoglobin


Mean Corpuscular   
                      31.2  L
  
              



Hemoglobin
Concen


t


Red Cell                                   16.8  H


Distribution


Width


Platelet Count                             100  #L


Mean Platelet                              12.1  H


Volume


Immature                                  3.600  H


Granulocytes %


Neutrophils %                               75.1


Lymphocytes %                               16.7


Monocytes %                                  4.4


Eosinophils %                                0.0


Basophils %                                  0.2


Nucleated Red                               8.6  H


Blood Cells %


Immature                                  0.330  H


Granulocytes #


Neutrophils #                                6.8


Lymphocytes #                                1.5


Monocytes #                                  0.4


Eosinophils #                                0.0


Basophils #                                  0.0


Nucleated Red                               0.8  H


Blood Cells #


Sodium Level                                 141


Potassium Level                             5.9  H


Chloride Level                               106


Carbon Dioxide                               14  L


Level


Anion Gap                                    21  H


Blood Urea                                   70  H


Nitrogen


Creatinine                                 1.85  H


Est Glomerular     
                   
            
              



Filtrat


Rate
mL/min


Glucose Level                             468  #*H


Calcium Level                               8.0  L


Bedside Glucose                                            15  *L          55  L


Test
                 1/25/19
11:05  1/25/19
12:28  1/25/19
14:40  1/25/19
17:00


Bedside Glucose              357  H         235  H         240  H


Sodium Level                                                              148  H


Potassium Level                                                            4.8


Chloride Level                                                             108


Carbon Dioxide                                                             25  #


Level


Anion Gap                                                                  15  H


Blood Urea                                                                 77  H


Nitrogen


Creatinine                                                               1.75  H


Est Glomerular     
                 
              
                



Filtrat


Rate
mL/min


Glucose Level                                                             120  #


Calcium Level                                                             8.3  L


Test
                 1/25/19
17:09  1/25/19
20:03  1/25/19
20:16  1/25/19
22:00


Bedside Glucose               128            100                            79


Magnesium Level                                            2.7  H


Troponin I                                                0.107


Test
                 1/25/19
23:43  1/26/19
01:42  1/26/19
03:58  1/26/19
04:37


Bedside Glucose               104            112             98


Sodium Level                                                              146  H


Potassium Level                                                            4.0


Chloride Level                                                             109


Carbon Dioxide                                                              26


Level


Anion Gap                                                                   11


Blood Urea                                                                 68  H


Nitrogen


Creatinine                                                               1.51  H


Est Glomerular     
                 
              
                



Filtrat


Rate
mL/min


Glucose Level                                                              113


Calcium Level                                                              9.0


Test
                 1/26/19
04:38  1/26/19
06:17  1/26/19
07:54  



Thyroid                   5.090  H
  
              
              



Stimulating


Hormone
(TSH)


Random Cortisol              38.6


Bedside Glucose                               84            68  L








Medications


Medications





Current Medications


IV Flush (NS 3 ml) 3 ml PER PROTOCOL IV ;  Start 1/23/19 at 22:30


Ondansetron HCl (Zofran Tab) 4 mg Q6H  PRN PO nausea;  Start 1/23/19 at 22:30


Acetaminophen (Tylenol Tab) 650 mg Q6H  PRN PO fever Last administered on 


1/24/19at 14:52; Admin Dose 650 MG;  Start 1/23/19 at 22:30


Docusate Sodium (Colace) 100 mg Q12H  PRN PO CONSTIPATION;  Start 1/23/19 at 


22:30


Magnesium Hydroxide (Milk Of Mag) 30 ml DAILY  PRN PO indigestion;  Start 


1/23/19 at 22:30


Famotidine (Pepcid) 20 mg Q24H PO  Last administered on 1/24/19at 22:24; Admin 


Dose 20 MG;  Start 1/23/19 at 23:00


Apixaban (Eliquis) 5 mg BID PO  Last administered on 1/26/19at 08:35; Admin Dose


5 MG;  Start 1/23/19 at 23:00


Aspirin (Halfprin) 81 mg DAILY PO  Last administered on 1/26/19at 08:35; Admin 


Dose 81 MG;  Start 1/24/19 at 09:00


Fluticasone/ Vilanterol (Breo Ellipta 100-25 Mcg Inh) 1 inh DAILY INH  Last 


administered on 1/25/19at 08:19; Admin Dose 1 INH;  Start 1/24/19 at 09:00


Metoprolol Succinate (Toprol Xl) 200 mg BID PO  Last administered on 1/26/19at 


08:35; Admin Dose 200 MG;  Start 1/23/19 at 23:00


Tiotropium Bromide (Spiriva) 1 inh DAILY INH  Last administered on 1/24/19at 


13:44; Admin Dose 1 INH;  Start 1/24/19 at 09:00


Digoxin (Digoxin) 0.125 mg DAILY@13 PO  Last administered on 1/24/19at 13:42; 


Admin Dose 0.125 MG;  Start 1/24/19 at 13:00;  Status Hold


Dextrose 1,000 ml @  100 mls/hr Q10H IV  Last administered on 1/26/19at 05:43; 


Admin Dose 100 MLS/HR;  Start 1/25/19 at 10:30


Miscellaneous Information 1 ea NOTE XX ;  Start 1/25/19 at 20:30


Glucose (Glutose) 15 gm Q15M  PRN PO DECREASED GLUCOSE;  Start 1/25/19 at 20:30


Glucose (Glutose) 22.5 gm Q15M  PRN PO DECREASED GLUCOSE;  Start 1/25/19 at 


20:30


Dextrose (D50w Syringe) 25 ml Q15M  PRN IV DECREASED GLUCOSE;  Start 1/25/19 at 


20:30


Dextrose (D50w Syringe) 50 ml Q15M  PRN IV DECREASED GLUCOSE;  Start 1/25/19 at 


20:30


Glucagon (Glucagen) 1 mg Q15M  PRN IM DECREASED GLUCOSE;  Start 1/25/19 at 20:30


Glucose (Glutose) 15 gm Q15M  PRN BUCCAL DECREASED GLUCOSE;  Start 1/25/19 at 


20:30


Metoprolol Tartrate (Lopressor) 5 mg Q6 IV  Last administered on 1/26/19at 


06:34; Admin Dose 5 MG;  Start 1/26/19 at 00:00


Diagnostic Test (Pha) (Accu-Chek) 1 ea Q4 XX ;  Start 1/26/19 at 09:00











MEHNAZ RG MD              Jan 26, 2019 08:47

## 2019-01-26 NOTE — CONS
Assessment/Plan


Assessment/Plan


Problems:  


(1) Hypercortisolemia


Status:  Acute


Comment:  Given patient's presentation Cortisol of 38 does not make sense. 


Recheck cortisol level at midnight





(2) Hypoglycemia


Status:  Acute


Comment:  Low blood sugar readings improved. Continued discrepancy between POC 


fingersticks and AC veinous reading. Etiology undetermined but possibly 


secondary to PVD. Awaiting insulin level and serum sulfonylurea levels.





(3) TSH elevation


Status:  Acute


Comment:  First TSH reading within range at 3. Second TSH reading 5. This most 


compatible with Sick Euthyroid Syndrome








Consultation Date/Type/Reason


Admit Date/Time


Jan 25, 2019 at 13:18


Initial Consult Date


Jan 25, 2109


Type of Consult


Endocrine


Reason for Consultation


Hypoglycemia


Date/Time of Note


DATE: 1/26/19 


TIME: 13:48





24 HR Interval Summary


Free Text/Dictation


No hypoglycemic events





Exam/Review of Systems


Exam


Vitals





Vital Signs


  Date      Temp  Pulse  Resp  B/P (MAP)   Pulse Ox  O2          O2 Flow    FiO2


Time                                                 Delivery    Rate


   1/26/19           95


     12:00


   1/26/19  97.8           30  92/78 (83)        94  Nasal             2.0


     12:00                                           Cannula


   1/26/19                                                                    50


     02:46








Intake and Output





1/25/19 1/25/19 1/26/19





1515:00


23:00


07:00





IntakeIntake Total


500 ml


800 ml


800 ml





OutputOutput Total


160 ml


175 ml





BalanceBalance


500 ml


640 ml


625 ml











Constitutional:  frail, other (cachectic)


Psych:  other (in restraints)


Neck:  supple


Respiratory:  clear to auscultation


Cardiovascular:  irregular rhythm


Gastrointestinal:  soft


Musculoskeletal:  nl extremities to inspection, other (xerosis)


Extremities:  other (cold extremities)


Skin:  other (Limited speech put answers yes and no appropriately to questions)


Additional Comments


POC glucose and labs reviewed





Results


Result Diagram:  


1/25/19 1043                                                                    


           1/26/19 0437





Results 24hrs





Laboratory Tests


Test
              1/25/19
14:40  1/25/19
17:00     1/25/19
17:09  1/25/19
20:03


Bedside Glucose           240  H                            128            100


Sodium Level                             148  H


Potassium Level                           4.8


Chloride Level                            108


Carbon Dioxide                            25  #


Level


Anion Gap                                 15  H


Blood Urea                                77  H


Nitrogen


Creatinine                              1.75  H


Est Glomerular     
                
            
                 



Filtrat


Rate
mL/min


Glucose Level                            120  #


Calcium Level                            8.3  L


Test
              1/25/19
20:16  1/25/19
22:00     1/25/19
23:43  1/26/19
01:42


Magnesium Level           2.7  H


Troponin I               0.107


Bedside Glucose                            79               104            112


Test
              1/26/19
03:58  1/26/19
04:37     1/26/19
04:38  1/26/19
06:17


Bedside Glucose             98                                              84


Sodium Level                             146  H


Potassium Level                           4.0


Chloride Level                            109


Carbon Dioxide                             26


Level


Anion Gap                                  11


Blood Urea                                68  H


Nitrogen


Creatinine                              1.51  H


Est Glomerular     
                
            
                 



Filtrat


Rate
mL/min


Glucose Level                             113


Calcium Level                             9.0


Thyroid            
              
                     5.090  H
  



Stimulating


Hormone
(TSH)


Random Cortisol                                            38.6


Test
              1/26/19
07:54  1/26/19
09:07     1/26/19
09:27  1/26/19
12:23


Bedside Glucose            68  L          156                              188


Blood Gas          
              
              Blood arterial
   



Specimen Source



Arterial Blood     
              
              1/26/2019
12:10:  



Date Drawn
                                                 00 PM


Arterial Blood pH  
              
                      7.428  
  



(Temp
corrected)


Arterial Blood     
              
                      17.1  L
  



pCO2


(Temp
correct)


Arterial Blood     
              
                      55.6  L
  



pO2


(Temp
corrected)


Arterial Blood                                            11.0  L


HCO3


Arterial Blood                                           -10.3  L


Base Excess


Arterial Blood     
              
                      87.5  L
  



Oxygen
Saturation


Alex Test                                       ACCEPTAB


Arterial Blood     
              
              Right Radial  
   



Gas Puncture
Site


Arterial           
              
                        0.1  
  



Blood
Carboxyhemo


globin


Arterial Blood                                              0.1


Methemoglobin


Blood Gas A-a O2                                          73.7  H


Differential


Oxyhemoglobin                                             87.3  L


Percent


Blood Gas                                                  37.0


Temperature


Blood Gas                                                  14.0


Respiration Rate


Blood Gas Actual   
              
                         33  
  



Respiration
Rate


Blood Gas                                        ROOM AIR


Modality


FiO2                                                       21.0


Blood Gas                                                    10


Pressure Support


Blood Gas                                                  15/5


IPAP/EPAP Ratio


Blood Gas                                        DT


Notified Whom


Blood Gas          
              
              1/26/2019
12:34:  



Notified Time
                                              00 PM








Medications


Medication





Current Medications


IV Flush (NS 3 ml) 3 ml PER PROTOCOL IV ;  Start 1/23/19 at 22:30


Ondansetron HCl (Zofran Tab) 4 mg Q6H  PRN PO nausea;  Start 1/23/19 at 22:30


Acetaminophen (Tylenol Tab) 650 mg Q6H  PRN PO fever Last administered on 


1/24/19at 14:52; Admin Dose 650 MG;  Start 1/23/19 at 22:30


Docusate Sodium (Colace) 100 mg Q12H  PRN PO CONSTIPATION;  Start 1/23/19 at 


22:30


Magnesium Hydroxide (Milk Of Mag) 30 ml DAILY  PRN PO indigestion;  Start 


1/23/19 at 22:30


Famotidine (Pepcid) 20 mg Q24H PO  Last administered on 1/24/19at 22:24; Admin 


Dose 20 MG;  Start 1/23/19 at 23:00


Apixaban (Eliquis) 5 mg BID PO  Last administered on 1/26/19at 08:35; Admin Dose


5 MG;  Start 1/23/19 at 23:00


Aspirin (Halfprin) 81 mg DAILY PO  Last administered on 1/26/19at 08:35; Admin 


Dose 81 MG;  Start 1/24/19 at 09:00


Fluticasone/ Vilanterol (Breo Ellipta 100-25 Mcg Inh) 1 inh DAILY INH  Last 


administered on 1/25/19at 08:19; Admin Dose 1 INH;  Start 1/24/19 at 09:00


Metoprolol Succinate (Toprol Xl) 200 mg BID PO  Last administered on 1/26/19at 


08:35; Admin Dose 200 MG;  Start 1/23/19 at 23:00


Tiotropium Bromide (Spiriva) 1 inh DAILY INH  Last administered on 1/24/19at 


13:44; Admin Dose 1 INH;  Start 1/24/19 at 09:00


Digoxin (Digoxin) 0.125 mg DAILY@13 PO  Last administered on 1/24/19at 13:42; 


Admin Dose 0.125 MG;  Start 1/24/19 at 13:00;  Status Hold


Dextrose 1,000 ml @  100 mls/hr Q10H IV  Last administered on 1/26/19at 05:43; 


Admin Dose 100 MLS/HR;  Start 1/25/19 at 10:30


Miscellaneous Information 1 ea NOTE XX ;  Start 1/25/19 at 20:30


Glucose (Glutose) 15 gm Q15M  PRN PO DECREASED GLUCOSE;  Start 1/25/19 at 20:30


Glucose (Glutose) 22.5 gm Q15M  PRN PO DECREASED GLUCOSE;  Start 1/25/19 at 


20:30


Dextrose (D50w Syringe) 25 ml Q15M  PRN IV DECREASED GLUCOSE;  Start 1/25/19 at 


20:30


Dextrose (D50w Syringe) 50 ml Q15M  PRN IV DECREASED GLUCOSE;  Start 1/25/19 at 


20:30


Glucagon (Glucagen) 1 mg Q15M  PRN IM DECREASED GLUCOSE;  Start 1/25/19 at 20:30


Glucose (Glutose) 15 gm Q15M  PRN BUCCAL DECREASED GLUCOSE;  Start 1/25/19 at 


20:30


Metoprolol Tartrate (Lopressor) 5 mg Q6 IV  Last administered on 1/26/19at 


06:34; Admin Dose 5 MG;  Start 1/26/19 at 00:00


Diagnostic Test (Pha) (Accu-Chek) 1 ea Q4 XX  Last administered on 1/26/19at 


12:34; Admin Dose 1 EA;  Start 1/26/19 at 09:00











YASIR JOHNSON MD               Jan 26, 2019 14:02

## 2019-01-26 NOTE — NUR
PICC Insertion. Attended to pt in ICU room 117 for peripherally inserted central catheter 
(PICC) insertion.  Patient (awake, alert, oriented x 4).  Patient has had previous PICC's in 
the past.  Procedure reviewed, patient agreed to proceed.  Signed consent by sister on chart 
for PICC. RUE prepped with chlorhexidene, then maximum barrier drape applied.  5 FR double 
lumen Arrow Power PICC inserted into brachial vein, using U/S guidance and sterile 
technique.  CXR performed; tip confirmed in lower 1/3 SVC.  Sterile dressing applied.  
Patient tolerated procedure well. total cath length 43 cm, 43 in/zero out.

## 2019-01-26 NOTE — CONS
Assessment/Plan


Assessment/Plan


Hospital Course (Demo Recall)


73-year-old male with chronic atrial fibrillation and hypertension, recently 


discharged from [facility], was brought into the emergency 


room after he had a ground level fall at [facility]. He was found to be hypoglycemic in


the field.  Blood sugars in the ER were as low as 14.  His mental status 


improved after he received D50 and placed on D5.  Patient was also noted to have


acute kidney injury with BUN of 71 and creatinine of 2.23.  Attempt to insert a 


Lal catheter by the nursing staff was not successful.  The patient had a gross


bloody urine was blood clots therefore a urological consultation was requested.


On the exam the bladder did not seem to be distended.  Bladder scan shows a 


bladder volume of 14 mL.  I did go ahead and prep the genital area and was able 


to insert a 16 [place] coud catheter.  I irrigated the catheter and it does 


irrigate well.  The patient has no urine output and he is anuric.  Rectal exam 


showed that the prostate is large and nodular.  I will get his serum PSA on him.


 And keep the Lal catheter in.





Consultation Date/Type/Reason


Admit Date/Time


Jan 25, 2019 at 13:18


Date of Consultation:  Jan 26, 2019


Type of Consult


Urology


Reason for Consultation


Acute kidney injury and difficulty inserting a Lal catheter by the nursing 


staff causing gross hematuria


Requesting Provider:  LAURIE HARE MD


Date/Time of Note


DATE: 1/26/19 


TIME: 14:50





Hx of Present Illness


73-year-old male with chronic atrial fibrillation and hypertension, recently 


discharged from [facility], was brought into the emergency 


room after he had a ground level fall at [facility]. He was found to be hypoglycemic in


the field.  Blood sugars in the ER were as low as 14.  His mental status imp


roved after he received D50 and placed on D5.  Patient was also noted to have 


acute kidney injury with BUN of 71 and creatinine of 2.23.  Attempt to insert a 


Lal catheter by the nursing staff was not successful.  The patient had a gross


bloody urine was blood clots therefore a urological consultation was requested.


Subjective hx not possible:  other (Patient very weak and having difficulty 


breathing)


Constitutional:  requiring O2


Eyes:  no complaints


ENT:  no complaints


Respiratory:  shortness of breath


Cardiovascular:  other (Atrial fibrillation)


Gastrointestinal:  no complaints


Genitourinary:  bleeding (From urethra), hematuria


Musculoskeletal:  no complaints


Skin:  no complaints


Endocrine:  no complaints


Lymphatic:  no complaints


Psychological:  no complaints





Past Medical History


Medical History:  hypertension, other (Atrial fibrillation, COPD and recent 


syncopal episode)


Home Meds


Active Scripts


Metoprolol Succinate* (Toprol XL*) 100 Mg Tab.sr.24h, 200 MG PO BID for 30 Days


   Prov:LAURIE HARE MD         1/22/19


Diltiazem Hcl* (Cardizem CD*) 180 Mg Cap.sr.24h, 180 MG PO DAILY, #30 CAP


   Prov:LAURIE HARE MD         1/21/19


Furosemide* (Furosemide*) 20 Mg Tablet, 20 MG PO DAILY for 30 Days, TAB 3 


Refills


   Prov:EMERY RUSSELL         1/19/19


Fluticasone-Vilanterol (Breo Ellipta Inhaler) 100-25 Mcg/Actuation Aer.pow.ba, 1


INH INH DAILY, #1 3 Refills


   Prov:EMERY RUSSELL         1/19/19


Aspirin Delayed Release (Aspirin Delayed Release) 81 Mg Tablet.dr, 81 MG PO 


DAILY for 30 Days, 3 Refills


   Prov:EMERY RUSSELL         1/19/19


Lisinopril* (Lisinopril*) 5 Mg Tablet, 2.5 MG PO QHS for 30 Days, TAB 3 Refills


   1/2 tab daily


   Prov:EMERY RUSSELL         1/19/19


Apixaban* (Eliquis*) 5 Mg Tablet, 5 MG PO BID for 30 Days, TAB 3 Refills


   Prov:EMERY RUSSELL         1/19/19


Tiotropium Bromide* (Spiriva*) 18 Mcg Cap.w.dev, 1 INH INH DAILY, #1 3 Refills


   Prov:EMERY RUSSELL         1/19/19


Discontinued Reported Medications


Olmesartan-Amlodipine-HCTZ (Tribenzor) 40-10-25 Mg Tablet, 1 TAB PO DAILY, TAB


   1/15/19


Discontinued Scripts


Nicotine* (Nicotine* Patch) 21 mg/day Patch, 1 PATCH TRANSDERM DAILY for 7 Days


   Prov:LAURIE HARE MD         1/21/19


Metoprolol Succinate* (Toprol XL*) 50 Mg Tab.er.24h, 50 MG PO BID for 30 Days, 3


Refills


   Prov:EMERY RUSSELL         1/19/19


Medications





Current Medications


IV Flush (NS 3 ml) 3 ml PER PROTOCOL IV ;  Start 1/23/19 at 22:30


Ondansetron HCl (Zofran Tab) 4 mg Q6H  PRN PO nausea;  Start 1/23/19 at 22:30


Acetaminophen (Tylenol Tab) 650 mg Q6H  PRN PO fever Last administered on 


1/24/19at 14:52; Admin Dose 650 MG;  Start 1/23/19 at 22:30


Docusate Sodium (Colace) 100 mg Q12H  PRN PO CONSTIPATION;  Start 1/23/19 at 


22:30


Magnesium Hydroxide (Milk Of Mag) 30 ml DAILY  PRN PO indigestion;  Start 


1/23/19 at 22:30


Famotidine (Pepcid) 20 mg Q24H PO  Last administered on 1/24/19at 22:24; Admin 


Dose 20 MG;  Start 1/23/19 at 23:00


Apixaban (Eliquis) 5 mg BID PO  Last administered on 1/26/19at 08:35; Admin Dose


5 MG;  Start 1/23/19 at 23:00


Aspirin (Halfprin) 81 mg DAILY PO  Last administered on 1/26/19at 08:35; Admin 


Dose 81 MG;  Start 1/24/19 at 09:00


Fluticasone/ Vilanterol (Breo Ellipta 100-25 Mcg Inh) 1 inh DAILY INH  Last 


administered on 1/25/19at 08:19; Admin Dose 1 INH;  Start 1/24/19 at 09:00


Metoprolol Succinate (Toprol Xl) 200 mg BID PO  Last administered on 1/26/19at 


08:35; Admin Dose 200 MG;  Start 1/23/19 at 23:00


Tiotropium Bromide (Spiriva) 1 inh DAILY INH  Last administered on 1/24/19at 


13:44; Admin Dose 1 INH;  Start 1/24/19 at 09:00


Digoxin (Digoxin) 0.125 mg DAILY@13 PO  Last administered on 1/24/19at 13:42; 


Admin Dose 0.125 MG;  Start 1/24/19 at 13:00;  Status Hold


Dextrose 1,000 ml @  100 mls/hr Q10H IV  Last administered on 1/26/19at 05:43; 


Admin Dose 100 MLS/HR;  Start 1/25/19 at 10:30


Miscellaneous Information 1 ea NOTE XX ;  Start 1/25/19 at 20:30


Glucose (Glutose) 15 gm Q15M  PRN PO DECREASED GLUCOSE;  Start 1/25/19 at 20:30


Glucose (Glutose) 22.5 gm Q15M  PRN PO DECREASED GLUCOSE;  Start 1/25/19 at 


20:30


Dextrose (D50w Syringe) 25 ml Q15M  PRN IV DECREASED GLUCOSE;  Start 1/25/19 at 


20:30


Dextrose (D50w Syringe) 50 ml Q15M  PRN IV DECREASED GLUCOSE;  Start 1/25/19 at 


20:30


Glucagon (Glucagen) 1 mg Q15M  PRN IM DECREASED GLUCOSE;  Start 1/25/19 at 20:30


Glucose (Glutose) 15 gm Q15M  PRN BUCCAL DECREASED GLUCOSE;  Start 1/25/19 at 


20:30


Metoprolol Tartrate (Lopressor) 5 mg Q6 IV  Last administered on 1/26/19at 


06:34; Admin Dose 5 MG;  Start 1/26/19 at 00:00


Diagnostic Test (Pha) (Accu-Chek) 1 ea Q4 XX  Last administered on 1/26/19at 


12:34; Admin Dose 1 EA;  Start 1/26/19 at 09:00


Allergies:  


Coded Allergies:  


     No Known Drug Allergies (Verified  Allergy, Unknown, 1/23/19)





Past Surgical History


Past Surgical Hx:  no surgical history (None that we know of)





Social History


Alcohol Use:  other (Unknown history)


Smoking Status:  Current some day smoker





Exam/Review of Systems


Exam


Vitals





Vital Signs


  Date      Temp  Pulse  Resp  B/P (MAP)   Pulse Ox  O2          O2 Flow    FiO2


Time                                                 Delivery    Rate


   1/26/19           95


     12:00


   1/26/19  97.8           30  92/78 (83)        94  Nasal             2.0


     12:00                                           Cannula


   1/26/19                                                                    50


     02:46








Intake and Output





1/25/19 1/25/19 1/26/19





1515:00


23:00


07:00





IntakeIntake Total


500 ml


800 ml


800 ml





OutputOutput Total


160 ml


175 ml





BalanceBalance


500 ml


640 ml


625 ml











Constitutional:  frail


Head:  normocephalic


Eyes:  nl conjunctiva


ENMT:  nl external ears & nose


Neck:  supple


Respiratory:  labored breathing; 


   No wheezing


Cardiovascular:  other (Atrial fib)


Gastrointestinal:  soft; 


   No tender


Genitourinary - Male:  other (Blood and blood clots coming out of the urethra.  


The bladder is not distended.  I did a bladder scan on him and that only showed 


about 14 mL.  Rectal exam: Prostate is large and nodular)


Musculoskeletal:  nl extremities to inspection


Extremities:  No calf tenderness, No edema


Neurological:  lethargic


Skin:  nl turgor





Results


Result Diagram:  


1/25/19 1043                                                                    


           1/26/19 0437





Results 24hrs





Laboratory Tests


Test
              1/25/19
17:00     1/25/19
17:09  1/25/19
20:03  1/25/19
20:16


Sodium Level              148  H


Potassium Level            4.8


Chloride Level             108


Carbon Dioxide             25  #


Level


Anion Gap                  15  H


Blood Urea                 77  H


Nitrogen


Creatinine               1.75  H


Est Glomerular       
            
                 
              



Filtrat


Rate
mL/min


Glucose Level             120  #


Calcium Level             8.3  L


Bedside Glucose                              128            100


Magnesium Level                                                           2.7  H


Troponin I                                                               0.107


Test
              1/25/19
22:00     1/25/19
23:43  1/26/19
01:42  1/26/19
03:58


Bedside Glucose             79               104            112             98


Test
              1/26/19
04:37     1/26/19
04:38  1/26/19
06:17  1/26/19
07:54


Sodium Level              146  H


Potassium Level            4.0


Chloride Level             109


Carbon Dioxide              26


Level


Anion Gap                   11


Blood Urea                 68  H


Nitrogen


Creatinine               1.51  H


Est Glomerular       
            
                 
              



Filtrat


Rate
mL/min


Glucose Level              113


Calcium Level              9.0


Thyroid            
                     5.090  H
  
              



Stimulating


Hormone
(TSH)


Random Cortisol                             38.6


Bedside Glucose                                              84            68  L


Test
              1/26/19
09:07     1/26/19
09:27  1/26/19
12:23  



Bedside Glucose            156                              188


Blood Gas          
              Blood arterial
   
              



Specimen Source



Arterial Blood     
              1/26/2019
12:10:  
              



Date Drawn
                                  00 PM


Arterial Blood pH  
                      7.428  
  
              



(Temp
corrected)


Arterial Blood     
                      17.1  L
  
              



pCO2


(Temp
correct)


Arterial Blood     
                      55.6  L
  
              



pO2


(Temp
corrected)


Arterial Blood                             11.0  L


HCO3


Arterial Blood                            -10.3  L


Base Excess


Arterial Blood     
                      87.5  L
  
              



Oxygen
Saturation


Alex Test                        ACCEPTAB


Arterial Blood     
              Right Radial  
   
              



Gas Puncture
Site


Arterial           
                        0.1  
  
              



Blood
Carboxyhemo


globin


Arterial Blood                               0.1


Methemoglobin


Blood Gas A-a O2                           73.7  H


Differential


Oxyhemoglobin                              87.3  L


Percent


Blood Gas                                   37.0


Temperature


Blood Gas                                   14.0


Respiration Rate


Blood Gas Actual   
                         33  
  
              



Respiration
Rate


Blood Gas                         ROOM AIR


Modality


FiO2                                        21.0


Blood Gas                                     10


Pressure Support


Blood Gas                                   15/5


IPAP/EPAP Ratio


Blood Gas                         DT


Notified Whom


Blood Gas          
              1/26/2019
12:34:  
              



Notified Time
                               00 PM








Medications


Medication





Current Medications


IV Flush (NS 3 ml) 3 ml PER PROTOCOL IV ;  Start 1/23/19 at 22:30


Ondansetron HCl (Zofran Tab) 4 mg Q6H  PRN PO nausea;  Start 1/23/19 at 22:30


Acetaminophen (Tylenol Tab) 650 mg Q6H  PRN PO fever Last administered on 


1/24/19at 14:52; Admin Dose 650 MG;  Start 1/23/19 at 22:30


Docusate Sodium (Colace) 100 mg Q12H  PRN PO CONSTIPATION;  Start 1/23/19 at 


22:30


Magnesium Hydroxide (Milk Of Mag) 30 ml DAILY  PRN PO indigestion;  Start 


1/23/19 at 22:30


Famotidine (Pepcid) 20 mg Q24H PO  Last administered on 1/24/19at 22:24; Admin 


Dose 20 MG;  Start 1/23/19 at 23:00


Apixaban (Eliquis) 5 mg BID PO  Last administered on 1/26/19at 08:35; Admin Dose


5 MG;  Start 1/23/19 at 23:00


Aspirin (Halfprin) 81 mg DAILY PO  Last administered on 1/26/19at 08:35; Admin 


Dose 81 MG;  Start 1/24/19 at 09:00


Fluticasone/ Vilanterol (Breo Ellipta 100-25 Mcg Inh) 1 inh DAILY INH  Last 


administered on 1/25/19at 08:19; Admin Dose 1 INH;  Start 1/24/19 at 09:00


Metoprolol Succinate (Toprol Xl) 200 mg BID PO  Last administered on 1/26/19at 


08:35; Admin Dose 200 MG;  Start 1/23/19 at 23:00


Tiotropium Bromide (Spiriva) 1 inh DAILY INH  Last administered on 1/24/19at 


13:44; Admin Dose 1 INH;  Start 1/24/19 at 09:00


Digoxin (Digoxin) 0.125 mg DAILY@13 PO  Last administered on 1/24/19at 13:42; 


Admin Dose 0.125 MG;  Start 1/24/19 at 13:00;  Status Hold


Dextrose 1,000 ml @  100 mls/hr Q10H IV  Last administered on 1/26/19at 05:43; 


Admin Dose 100 MLS/HR;  Start 1/25/19 at 10:30


Miscellaneous Information 1 ea NOTE XX ;  Start 1/25/19 at 20:30


Glucose (Glutose) 15 gm Q15M  PRN PO DECREASED GLUCOSE;  Start 1/25/19 at 20:30


Glucose (Glutose) 22.5 gm Q15M  PRN PO DECREASED GLUCOSE;  Start 1/25/19 at 


20:30


Dextrose (D50w Syringe) 25 ml Q15M  PRN IV DECREASED GLUCOSE;  Start 1/25/19 at 


20:30


Dextrose (D50w Syringe) 50 ml Q15M  PRN IV DECREASED GLUCOSE;  Start 1/25/19 at 


20:30


Glucagon (Glucagen) 1 mg Q15M  PRN IM DECREASED GLUCOSE;  Start 1/25/19 at 20:30


Glucose (Glutose) 15 gm Q15M  PRN BUCCAL DECREASED GLUCOSE;  Start 1/25/19 at 


20:30


Metoprolol Tartrate (Lopressor) 5 mg Q6 IV  Last administered on 1/26/19at 


06:34; Admin Dose 5 MG;  Start 1/26/19 at 00:00


Diagnostic Test (Pha) (Accu-Chek) 1 ea Q4 XX  Last administered on 1/26/19at 


12:34; Admin Dose 1 EA;  Start 1/26/19 at 09:00











FLIP GEIGER MD            Jan 26, 2019 15:01

## 2019-01-26 NOTE — NUR
RN ATTEMPT TO PLACE LEAL CATHETER.  UPON INSERTION RESISTANCE MET, AND BLOOD NOTED.  
REMOVED LEAL, BLOOD CLOT NOTED.  HEMATURIA NOTED.  DR HARE NOTIFIED.  DR HARE TO CONSULT 
FOR DR MCKEON FOR CATHETER PLACEMENT.

## 2019-01-26 NOTE — NUR
END OF SHIFT SUMMARY - PT STABLE ON 2L OXYGEN.  PT CONTINUES TO HAVE BLEEDING AROUND URINARY 
MEATUS.  LABS ORDERED FOR INR AND H/H.  UNABLE TO OBTAIN ACCURATE ACCUCHECK VIA FINGERSTICK. 
 BLOOD GLUCOSE CHECKED VIA VENOUS BLOOD DRAW FROM LINE OR PERIPHERAL STICK.

## 2019-01-26 NOTE — NUR
SW Note: Attempted Consult



CSW presented to pt room, per RN, pt was in process of receiving george and requested CSW 
come back later.  



CSW will attempt to return for consult if time permits.

## 2019-01-26 NOTE — CONS
Assessment/Plan


Assessment/Plan


Assessment/Plan (Daily)


Chest x-ray is essentially unremarkable.


Assessment and recommendations;





1.  Patient initially admitted for DKA with acute on chronic renal injury with 


marked overall clinical improvement.


2.  Episode of hypoglycemia requiring transfer to ICU with altered mental status


with interval improvement as well.


3.  Anemia and thrombocytopenia.


4.  Metabolic acidosis due to combination of DKA as well as renal insufficiency.





Continue current supportive care.  Perform follow-up ABG.  Patient also can be 


transferred to medical floor.





Consultation Date/Type/Reason


Admit Date/Time


Jan 25, 2019 at 13:18


Initial Consult Date





Type of Consult


Pulmonary/critical care


Reason for Consultation


Patient's condition is markedly improved.  Patient now is completely awake and 


alert.  Has remained hemodynamically stable.  Denies any shortness of breath, 


chest pain, coughing, wheezing or any fever.


General exam; elderly male, awake alert, currently in no distress.


H EENT exam; supple neck, no JVD.  No lymphadenopathy.  Midline trachea.  No 


thyromegaly.  Patient is edentulous.  No neck masses.


Chest exam; clear to auscultation.  S1-S2 audible, no murmurs.  Irregular 


rhythm.


Abdomen exam; soft, nontender.  No organomegaly.  Bowel sounds audible.


Extremity exam; no peripheral edema or clubbing.


CNS exam; no focal deficit.


Date/Time of Note


DATE: 1/26/19 


TIME: 09:28





Exam/Review of Systems


Exam


Vitals





Vital Signs


  Date      Temp  Pulse  Resp  B/P (MAP)   Pulse Ox  O2          O2 Flow    FiO2


Time                                                 Delivery    Rate


   1/26/19          104    34      133/85        94  Nasal             2.0


     09:00                          (101)            Cannula


   1/26/19  95.0


     08:00


   1/26/19                                                                    50


     02:46








Intake and Output





1/25/19 1/25/19 1/26/19





1515:00


23:00


07:00





IntakeIntake Total


500 ml


800 ml


800 ml





OutputOutput Total


160 ml


175 ml





BalanceBalance


500 ml


640 ml


625 ml














Results


Result Diagram:  


1/25/19 1043                                                                    


           1/26/19 0437





Results 24hrs





Laboratory Tests


Test
                 1/25/19
10:06  1/25/19
10:13  1/25/19
10:25  1/25/19
10:28


Bedside Glucose              13  *L           75    < 13  *L       < 13  *L


Test
                 1/25/19
10:41  1/25/19
10:43  1/25/19
10:44  1/25/19
10:49


Blood Gas          Blood arterial
   
              
              



Specimen Source



Arterial Blood     1/25/2019
10:38:  
              
              



Date Drawn
                   32 AM


Arterial Blood pH        7.268  *L
  
              
              



(Temp
corrected)


Arterial Blood             18.9  L
  
              
              



pCO2


(Temp
correct)


Arterial Blood            169.8  H
  
              
              



pO2


(Temp
corrected)


Arterial Blood              8.4  *L


HCO3


Arterial Blood             -16.2  L


Base Excess


Arterial Blood              98.6  
  
              
              



Oxygen
Saturation


Alex Test         ACCEPTAB


Arterial Blood     Right Radial  
   
              
              



Gas Puncture
Site


Arterial                     0.3  
  
              
              



Blood
Carboxyhemo


globin


Arterial Blood                0.2


Methemoglobin


Blood Gas A-a O2           524.3  H


Differential


Oxyhemoglobin                98.1


Percent


Blood Gas                    37.0


Temperature


Blood Gas          MASK - NRB


Modality


FiO2                        100.0


Blood Gas          DR ROCHA  
     
              
              



Critical Value


Read
Back


Blood Gas          TM


Notified Whom


Blood Gas          1/25/2019
10:48:  
              
              



Notified Time
                33 AM


White Blood Count                            9.1


Red Blood Count                           3.57  #L


Hemoglobin                                11.3  #L


Hematocrit                                 36.2  L


Mean Corpuscular                          101.4  H


Volume


Mean Corpuscular                            31.7


Hemoglobin


Mean Corpuscular   
                      31.2  L
  
              



Hemoglobin
Concen


t


Red Cell                                   16.8  H


Distribution


Width


Platelet Count                             100  #L


Mean Platelet                              12.1  H


Volume


Immature                                  3.600  H


Granulocytes %


Neutrophils %                               75.1


Lymphocytes %                               16.7


Monocytes %                                  4.4


Eosinophils %                                0.0


Basophils %                                  0.2


Nucleated Red                               8.6  H


Blood Cells %


Immature                                  0.330  H


Granulocytes #


Neutrophils #                                6.8


Lymphocytes #                                1.5


Monocytes #                                  0.4


Eosinophils #                                0.0


Basophils #                                  0.0


Nucleated Red                               0.8  H


Blood Cells #


Sodium Level                                 141


Potassium Level                             5.9  H


Chloride Level                               106


Carbon Dioxide                               14  L


Level


Anion Gap                                    21  H


Blood Urea                                   70  H


Nitrogen


Creatinine                                 1.85  H


Est Glomerular     
                   
            
              



Filtrat


Rate
mL/min


Glucose Level                             468  #*H


Calcium Level                               8.0  L


Bedside Glucose                                            15  *L          55  L


Test
                 1/25/19
11:05  1/25/19
12:28  1/25/19
14:40  1/25/19
17:00


Bedside Glucose              357  H         235  H         240  H


Sodium Level                                                              148  H


Potassium Level                                                            4.8


Chloride Level                                                             108


Carbon Dioxide                                                             25  #


Level


Anion Gap                                                                  15  H


Blood Urea                                                                 77  H


Nitrogen


Creatinine                                                               1.75  H


Est Glomerular     
                 
              
                



Filtrat


Rate
mL/min


Glucose Level                                                             120  #


Calcium Level                                                             8.3  L


Test
                 1/25/19
17:09  1/25/19
20:03  1/25/19
20:16  1/25/19
22:00


Bedside Glucose               128            100                            79


Magnesium Level                                            2.7  H


Troponin I                                                0.107


Test
                 1/25/19
23:43  1/26/19
01:42  1/26/19
03:58  1/26/19
04:37


Bedside Glucose               104            112             98


Sodium Level                                                              146  H


Potassium Level                                                            4.0


Chloride Level                                                             109


Carbon Dioxide                                                              26


Level


Anion Gap                                                                   11


Blood Urea                                                                 68  H


Nitrogen


Creatinine                                                               1.51  H


Est Glomerular     
                 
              
                



Filtrat


Rate
mL/min


Glucose Level                                                              113


Calcium Level                                                              9.0


Test
                 1/26/19
04:38  1/26/19
06:17  1/26/19
07:54  1/26/19
09:07


Thyroid                   5.090  H
  
              
              



Stimulating


Hormone
(TSH)


Random Cortisol              38.6


Bedside Glucose                               84            68  L          156








Medications


Medication





Current Medications


IV Flush (NS 3 ml) 3 ml PER PROTOCOL IV ;  Start 1/23/19 at 22:30


Ondansetron HCl (Zofran Tab) 4 mg Q6H  PRN PO nausea;  Start 1/23/19 at 22:30


Acetaminophen (Tylenol Tab) 650 mg Q6H  PRN PO fever Last administered on 


1/24/19at 14:52; Admin Dose 650 MG;  Start 1/23/19 at 22:30


Docusate Sodium (Colace) 100 mg Q12H  PRN PO CONSTIPATION;  Start 1/23/19 at 


22:30


Magnesium Hydroxide (Milk Of Mag) 30 ml DAILY  PRN PO indigestion;  Start 


1/23/19 at 22:30


Famotidine (Pepcid) 20 mg Q24H PO  Last administered on 1/24/19at 22:24; Admin 


Dose 20 MG;  Start 1/23/19 at 23:00


Apixaban (Eliquis) 5 mg BID PO  Last administered on 1/26/19at 08:35; Admin Dose


5 MG;  Start 1/23/19 at 23:00


Aspirin (Halfprin) 81 mg DAILY PO  Last administered on 1/26/19at 08:35; Admin 


Dose 81 MG;  Start 1/24/19 at 09:00


Fluticasone/ Vilanterol (Breo Ellipta 100-25 Mcg Inh) 1 inh DAILY INH  Last 


administered on 1/25/19at 08:19; Admin Dose 1 INH;  Start 1/24/19 at 09:00


Metoprolol Succinate (Toprol Xl) 200 mg BID PO  Last administered on 1/26/19at 


08:35; Admin Dose 200 MG;  Start 1/23/19 at 23:00


Tiotropium Bromide (Spiriva) 1 inh DAILY INH  Last administered on 1/24/19at 


13:44; Admin Dose 1 INH;  Start 1/24/19 at 09:00


Digoxin (Digoxin) 0.125 mg DAILY@13 PO  Last administered on 1/24/19at 13:42; 


Admin Dose 0.125 MG;  Start 1/24/19 at 13:00;  Status Hold


Dextrose 1,000 ml @  100 mls/hr Q10H IV  Last administered on 1/26/19at 05:43; 


Admin Dose 100 MLS/HR;  Start 1/25/19 at 10:30


Miscellaneous Information 1 ea NOTE XX ;  Start 1/25/19 at 20:30


Glucose (Glutose) 15 gm Q15M  PRN PO DECREASED GLUCOSE;  Start 1/25/19 at 20:30


Glucose (Glutose) 22.5 gm Q15M  PRN PO DECREASED GLUCOSE;  Start 1/25/19 at 


20:30


Dextrose (D50w Syringe) 25 ml Q15M  PRN IV DECREASED GLUCOSE;  Start 1/25/19 at 


20:30


Dextrose (D50w Syringe) 50 ml Q15M  PRN IV DECREASED GLUCOSE;  Start 1/25/19 at 


20:30


Glucagon (Glucagen) 1 mg Q15M  PRN IM DECREASED GLUCOSE;  Start 1/25/19 at 20:30


Glucose (Glutose) 15 gm Q15M  PRN BUCCAL DECREASED GLUCOSE;  Start 1/25/19 at 


20:30


Metoprolol Tartrate (Lopressor) 5 mg Q6 IV  Last administered on 1/26/19at 


06:34; Admin Dose 5 MG;  Start 1/26/19 at 00:00


Diagnostic Test (Pha) (Accu-Chek) 1 ea Q4 XX  Last administered on 1/26/19at 


09:15; Admin Dose 1 EA;  Start 1/26/19 at 09:00











SHIRA HOLT                    Jan 26, 2019 09:30

## 2019-01-27 VITALS — RESPIRATION RATE: 24 BRPM | SYSTOLIC BLOOD PRESSURE: 77 MMHG | HEART RATE: 93 BPM | DIASTOLIC BLOOD PRESSURE: 42 MMHG

## 2019-01-27 VITALS — HEART RATE: 44 BPM

## 2019-01-27 VITALS — RESPIRATION RATE: 37 BRPM | SYSTOLIC BLOOD PRESSURE: 91 MMHG | DIASTOLIC BLOOD PRESSURE: 60 MMHG | HEART RATE: 89 BPM

## 2019-01-27 VITALS — RESPIRATION RATE: 13 BRPM | DIASTOLIC BLOOD PRESSURE: 76 MMHG | SYSTOLIC BLOOD PRESSURE: 105 MMHG | HEART RATE: 116 BPM

## 2019-01-27 VITALS — HEART RATE: 95 BPM | DIASTOLIC BLOOD PRESSURE: 71 MMHG | SYSTOLIC BLOOD PRESSURE: 92 MMHG | RESPIRATION RATE: 34 BRPM

## 2019-01-27 VITALS — SYSTOLIC BLOOD PRESSURE: 68 MMHG | DIASTOLIC BLOOD PRESSURE: 57 MMHG | HEART RATE: 121 BPM | RESPIRATION RATE: 35 BRPM

## 2019-01-27 VITALS — DIASTOLIC BLOOD PRESSURE: 117 MMHG | HEART RATE: 108 BPM | RESPIRATION RATE: 13 BRPM | SYSTOLIC BLOOD PRESSURE: 133 MMHG

## 2019-01-27 VITALS — HEART RATE: 43 BPM

## 2019-01-27 VITALS — HEART RATE: 101 BPM | RESPIRATION RATE: 31 BRPM

## 2019-01-27 VITALS — RESPIRATION RATE: 12 BRPM | HEART RATE: 53 BPM

## 2019-01-27 VITALS — RESPIRATION RATE: 23 BRPM | SYSTOLIC BLOOD PRESSURE: 96 MMHG | HEART RATE: 97 BPM | DIASTOLIC BLOOD PRESSURE: 56 MMHG

## 2019-01-27 VITALS — DIASTOLIC BLOOD PRESSURE: 66 MMHG | SYSTOLIC BLOOD PRESSURE: 78 MMHG | HEART RATE: 107 BPM | RESPIRATION RATE: 14 BRPM

## 2019-01-27 VITALS — HEART RATE: 98 BPM | RESPIRATION RATE: 43 BRPM | DIASTOLIC BLOOD PRESSURE: 74 MMHG | SYSTOLIC BLOOD PRESSURE: 107 MMHG

## 2019-01-27 VITALS — SYSTOLIC BLOOD PRESSURE: 119 MMHG | HEART RATE: 98 BPM | RESPIRATION RATE: 33 BRPM | DIASTOLIC BLOOD PRESSURE: 89 MMHG

## 2019-01-27 VITALS — HEART RATE: 94 BPM | SYSTOLIC BLOOD PRESSURE: 113 MMHG | DIASTOLIC BLOOD PRESSURE: 86 MMHG | RESPIRATION RATE: 35 BRPM

## 2019-01-27 VITALS — RESPIRATION RATE: 40 BRPM | HEART RATE: 134 BPM | DIASTOLIC BLOOD PRESSURE: 17 MMHG | SYSTOLIC BLOOD PRESSURE: 53 MMHG

## 2019-01-27 VITALS — DIASTOLIC BLOOD PRESSURE: 62 MMHG | SYSTOLIC BLOOD PRESSURE: 73 MMHG | RESPIRATION RATE: 27 BRPM | HEART RATE: 133 BPM

## 2019-01-27 VITALS — SYSTOLIC BLOOD PRESSURE: 118 MMHG | HEART RATE: 109 BPM | RESPIRATION RATE: 27 BRPM | DIASTOLIC BLOOD PRESSURE: 90 MMHG

## 2019-01-27 VITALS — HEART RATE: 91 BPM | DIASTOLIC BLOOD PRESSURE: 32 MMHG | RESPIRATION RATE: 13 BRPM | SYSTOLIC BLOOD PRESSURE: 42 MMHG

## 2019-01-27 VITALS — DIASTOLIC BLOOD PRESSURE: 73 MMHG | RESPIRATION RATE: 31 BRPM | HEART RATE: 95 BPM | SYSTOLIC BLOOD PRESSURE: 93 MMHG

## 2019-01-27 VITALS — SYSTOLIC BLOOD PRESSURE: 95 MMHG | DIASTOLIC BLOOD PRESSURE: 77 MMHG | HEART RATE: 102 BPM | RESPIRATION RATE: 17 BRPM

## 2019-01-27 VITALS — SYSTOLIC BLOOD PRESSURE: 92 MMHG | DIASTOLIC BLOOD PRESSURE: 77 MMHG | HEART RATE: 96 BPM | RESPIRATION RATE: 25 BRPM

## 2019-01-27 VITALS — HEART RATE: 99 BPM | RESPIRATION RATE: 14 BRPM | DIASTOLIC BLOOD PRESSURE: 49 MMHG | SYSTOLIC BLOOD PRESSURE: 89 MMHG

## 2019-01-27 VITALS — HEART RATE: 100 BPM | DIASTOLIC BLOOD PRESSURE: 63 MMHG | RESPIRATION RATE: 24 BRPM | SYSTOLIC BLOOD PRESSURE: 79 MMHG

## 2019-01-27 VITALS — HEART RATE: 97 BPM | RESPIRATION RATE: 29 BRPM

## 2019-01-27 VITALS — RESPIRATION RATE: 21 BRPM | HEART RATE: 99 BPM

## 2019-01-27 VITALS — HEART RATE: 108 BPM | RESPIRATION RATE: 29 BRPM

## 2019-01-27 VITALS — HEART RATE: 64 BPM

## 2019-01-27 VITALS — DIASTOLIC BLOOD PRESSURE: 79 MMHG | HEART RATE: 97 BPM | SYSTOLIC BLOOD PRESSURE: 98 MMHG | RESPIRATION RATE: 28 BRPM

## 2019-01-27 VITALS — SYSTOLIC BLOOD PRESSURE: 84 MMHG | HEART RATE: 99 BPM | DIASTOLIC BLOOD PRESSURE: 75 MMHG | RESPIRATION RATE: 31 BRPM

## 2019-01-27 VITALS — SYSTOLIC BLOOD PRESSURE: 92 MMHG | DIASTOLIC BLOOD PRESSURE: 70 MMHG | RESPIRATION RATE: 18 BRPM | HEART RATE: 133 BPM

## 2019-01-27 VITALS — HEART RATE: 111 BPM

## 2019-01-27 VITALS — HEART RATE: 98 BPM | DIASTOLIC BLOOD PRESSURE: 70 MMHG | SYSTOLIC BLOOD PRESSURE: 88 MMHG | RESPIRATION RATE: 24 BRPM

## 2019-01-27 VITALS — DIASTOLIC BLOOD PRESSURE: 36 MMHG | RESPIRATION RATE: 21 BRPM | SYSTOLIC BLOOD PRESSURE: 80 MMHG | HEART RATE: 154 BPM

## 2019-01-27 VITALS — HEART RATE: 23 BPM

## 2019-01-27 VITALS — HEART RATE: 75 BPM

## 2019-01-27 VITALS — HEART RATE: 82 BPM

## 2019-01-27 VITALS — HEART RATE: 58 BPM

## 2019-01-27 VITALS — RESPIRATION RATE: 14 BRPM | HEART RATE: 65 BPM

## 2019-01-27 VITALS — HEART RATE: 92 BPM | RESPIRATION RATE: 34 BRPM

## 2019-01-27 VITALS — RESPIRATION RATE: 12 BRPM

## 2019-01-27 VITALS — HEART RATE: 40 BPM

## 2019-01-27 VITALS — DIASTOLIC BLOOD PRESSURE: 20 MMHG | SYSTOLIC BLOOD PRESSURE: 39 MMHG | RESPIRATION RATE: 35 BRPM | HEART RATE: 95 BPM

## 2019-01-27 VITALS — RESPIRATION RATE: 15 BRPM | HEART RATE: 60 BPM

## 2019-01-27 VITALS — HEART RATE: 98 BPM | RESPIRATION RATE: 19 BRPM

## 2019-01-27 VITALS — HEART RATE: 97 BPM

## 2019-01-27 VITALS — HEART RATE: 26 BPM

## 2019-01-27 LAB
ADD MAN DIFF?: NO
ALLEN TEST: (no result)
ANION GAP: 16 (ref 5–13)
ANION GAP: 23 (ref 5–13)
ARTERIAL BASE EXCESS: -12 MMOL/L (ref -3–3)
ARTERIAL BASE EXCESS: -14.9 MMOL/L (ref -3–3)
ARTERIAL BASE EXCESS: -9.7 MMOL/L (ref -3–3)
ARTERIAL BLOOD GAS OXYGEN SAT: 94.8 MMHG (ref 95–100)
ARTERIAL BLOOD GAS OXYGEN SAT: 97.8 MMHG (ref 95–100)
ARTERIAL BLOOD GAS OXYGEN SAT: 99.1 MMHG (ref 95–100)
ARTERIAL COHB: 0.1 % (ref 0–3)
ARTERIAL COHB: 0.3 % (ref 0–3)
ARTERIAL COHB: 0.3 % (ref 0–3)
ARTERIAL FRACTION OF OXYHGB: 94.5 % (ref 93–99)
ARTERIAL FRACTION OF OXYHGB: 97.3 % (ref 93–99)
ARTERIAL FRACTION OF OXYHGB: 98.4 % (ref 93–99)
ARTERIAL HCO3: 14.2 MMOL/L (ref 22–26)
ARTERIAL HCO3: 14.9 MMOL/L (ref 22–26)
ARTERIAL HCO3: 8.9 MMOL/L (ref 22–26)
ARTERIAL METHB: 0.2 % (ref 0–1.5)
ARTERIAL METHB: 0.2 % (ref 0–1.5)
ARTERIAL METHB: 0.4 % (ref 0–1.5)
ARTERIAL PCO2: 13.5 MMHG (ref 35–45)
ARTERIAL PCO2: 29.2 MMHG (ref 35–45)
ARTERIAL PCO2: 46.7 MMHG (ref 35–45)
BASOPHIL #: 0 10^3/UL (ref 0–0.1)
BASOPHILS %: 0.2 % (ref 0–2)
BLOOD GAS LOW PEEP SETTING: 5 CMH2O
BLOOD GAS LOW PEEP SETTING: 5 CMH2O
BLOOD GAS TIDAL VOLUME: 500 ML
BLOOD GAS TIDAL VOLUME: 500 ML
BLOOD UREA NITROGEN: 65 MG/DL (ref 7–20)
BLOOD UREA NITROGEN: 73 MG/DL (ref 7–20)
CALCIUM: 7.5 MG/DL (ref 8.4–10.2)
CALCIUM: 8.5 MG/DL (ref 8.4–10.2)
CARBON DIOXIDE: 16 MMOL/L (ref 21–31)
CARBON DIOXIDE: 20 MMOL/L (ref 21–31)
CHLORIDE: 101 MMOL/L (ref 97–110)
CHLORIDE: 106 MMOL/L (ref 97–110)
CREATININE: 1.71 MG/DL (ref 0.61–1.24)
CREATININE: 1.8 MG/DL (ref 0.61–1.24)
EOSINOPHILS #: 0 10^3/UL (ref 0–0.5)
EOSINOPHILS %: 0 % (ref 0–7)
FIO2: 100 %
FIO2: 35 %
FIO2: 50 %
GLUCOSE: 111 MG/DL (ref 70–220)
GLUCOSE: 455 MG/DL (ref 70–220)
HEMATOCRIT: 38.8 % (ref 42–52)
HEMOGLOBIN: 12.6 G/DL (ref 14–18)
IMMATURE GRANS #M: 0.13 10^3/UL (ref 0–0.03)
IMMATURE GRANS % (M): 1.3 % (ref 0–0.43)
LYMPHOCYTES #: 1.8 10^3/UL (ref 0.8–2.9)
LYMPHOCYTES %: 18.3 % (ref 15–51)
MEAN CORPUSCULAR HEMOGLOBIN: 31.6 PG (ref 29–33)
MEAN CORPUSCULAR HGB CONC: 32.5 G/DL (ref 32–37)
MEAN CORPUSCULAR VOLUME: 97.2 FL (ref 82–101)
MEAN PLATELET VOLUME: 12.4 FL (ref 7.4–10.4)
MODE: (no result)
MONOCYTE #: 0.6 10^3/UL (ref 0.3–0.9)
MONOCYTES %: 6 % (ref 0–11)
NEUTROPHIL #: 7.3 10^3/UL (ref 1.6–7.5)
NEUTROPHILS %: 74.2 % (ref 39–77)
NUCLEATED RED BLOOD CELLS #: 1.4 10^3/UL (ref 0–0)
NUCLEATED RED BLOOD CELLS%: 13.9 /100WBC (ref 0–0)
O2 A-A PPRESDIFF RESPIRATORY: 157 MMHG (ref 7–24)
O2 A-A PPRESDIFF RESPIRATORY: 191 MMHG (ref 7–24)
O2 A-A PPRESDIFF RESPIRATORY: 353.6 MMHG (ref 7–24)
PLATELET COUNT: 100 10^3/UL (ref 140–415)
POSITIVE DIFF: (no result)
POTASSIUM: 4.8 MMOL/L (ref 3.5–5.1)
POTASSIUM: 5 MMOL/L (ref 3.5–5.1)
RED BLOOD COUNT: 3.99 10^6/UL (ref 4.7–6.1)
RED CELL DISTRIBUTION WIDTH: 17.4 % (ref 11.5–14.5)
SODIUM: 140 MMOL/L (ref 135–144)
SODIUM: 142 MMOL/L (ref 135–144)
TROPONIN-I: 0.19 NG/ML (ref 0–0.12)
WHITE BLOOD COUNT: 9.8 10^3/UL (ref 4.8–10.8)

## 2019-01-27 PROCEDURE — 5A1935Z RESPIRATORY VENTILATION, LESS THAN 24 CONSECUTIVE HOURS: ICD-10-PCS

## 2019-01-27 PROCEDURE — 0BH17EZ INSERTION OF ENDOTRACHEAL AIRWAY INTO TRACHEA, VIA NATURAL OR ARTIFICIAL OPENING: ICD-10-PCS

## 2019-01-27 RX ADMIN — Medication 1 MLS/HR: at 08:40

## 2019-01-27 RX ADMIN — ASPIRIN SCH MG: 81 TABLET, COATED ORAL at 09:00

## 2019-01-27 RX ADMIN — CARBOXYMETHYLCELLULOSE SODIUM 1 DROP: 10 GEL OPHTHALMIC at 09:46

## 2019-01-27 RX ADMIN — ASCORBIC ACID, VITAMIN A PALMITATE, CHOLECALCIFEROL, THIAMINE HYDROCHLORIDE, RIBOFLAVIN-5 PHOSPHATE SODIUM, PYRIDOXINE HYDROCHLORIDE, NIACINAMIDE, DEXPANTHENOL, ALPHA-TOCOPHEROL ACETATE, VITAMIN K1, FOLIC ACID, BIOTIN, CYANOCOBALAMIN 1 MLS/HR: 200; 3300; 200; 6; 3.6; 6; 40; 15; 10; 150; 600; 60; 5 INJECTION, SOLUTION INTRAVENOUS at 08:24

## 2019-01-27 RX ADMIN — MINERAL OIL, PETROLATUM 1 APPLIC: 425; 568 OINTMENT OPHTHALMIC at 09:46

## 2019-01-27 RX ADMIN — DOPAMINE HYDROCHLORIDE 1 MLS/HR: 40 INJECTION, SOLUTION, CONCENTRATE INTRAVENOUS at 09:50

## 2019-01-27 RX ADMIN — DOPAMINE HYDROCHLORIDE 1 MLS/HR: 160 INJECTION, SOLUTION INTRAVENOUS at 08:26

## 2019-01-27 RX ADMIN — FLUTICASONE FUROATE AND VILANTEROL TRIFENATATE 1 INH: 100; 25 POWDER RESPIRATORY (INHALATION) at 09:00

## 2019-01-27 RX ADMIN — LORAZEPAM 1 MG: 2 INJECTION, SOLUTION INTRAMUSCULAR; INTRAVENOUS at 09:45

## 2019-01-27 RX ADMIN — SODIUM BICARBONATE 1 ML: 84 INJECTION INTRAVENOUS at 09:46

## 2019-01-27 RX ADMIN — HEPARIN 1 MLS/HR: 100 SYRINGE at 08:25

## 2019-01-27 RX ADMIN — SODIUM CHLORIDE SCH MLS/HR: 234 INJECTION INTRAMUSCULAR; INTRAVENOUS; SUBCUTANEOUS at 04:13

## 2019-01-27 RX ADMIN — FLUTICASONE FUROATE AND VILANTEROL TRIFENATATE SCH INH: 100; 25 POWDER RESPIRATORY (INHALATION) at 09:00

## 2019-01-27 RX ADMIN — LIDOCAINE HYDROCHLORIDE 1 ML: 10 INJECTION, SOLUTION EPIDURAL; INFILTRATION; INTRACAUDAL; PERINEURAL at 08:27

## 2019-01-27 RX ADMIN — SODIUM CHLORIDE 1 MLS/HR: 234 INJECTION INTRAMUSCULAR; INTRAVENOUS; SUBCUTANEOUS at 04:13

## 2019-01-27 RX ADMIN — TIOTROPIUM BROMIDE 1 INH: 18 CAPSULE ORAL; RESPIRATORY (INHALATION) at 09:00

## 2019-01-27 RX ADMIN — PHENYLEPHRINE HYDROCHLORIDE 1 MLS/HR: 10 INJECTION INTRAVENOUS at 09:48

## 2019-01-27 RX ADMIN — Medication 1 MLS/HR: at 07:45

## 2019-01-27 RX ADMIN — NOREPINEPHRINE BITARTRATE 1 MLS/HR: 1 INJECTION INTRAVENOUS at 09:47

## 2019-01-27 RX ADMIN — ASPIRIN 1 MG: 81 TABLET, COATED ORAL at 09:00

## 2019-01-27 NOTE — NUR
Pt received while in second cariac arrest. Total of 3 Code Blue for PEA arrest. Pt is 
currently intubated with 7.5 ETT, 24@ lip, AC 12  100% FiO2 +5 PEEP. Lungs clear. Jonathan 
Hugger in place. Pt is currently on Bicarb drip, Levophed, Tobias-Synephrine, Dopamine & 
Epinephrine: all max doses. BP currently 95/77. Dr. Jovel at bedside aware of events. Family 
aware of critical condition.

## 2019-01-27 NOTE — EN
Date/Time of Note


Date/Time of Note


DATE: 1/27/19 


TIME: 08:12





ER Progress Note


CODE BLUE





CODE BLUE was called on this patient who is here for syncope with altered mental


status.





I seen the patient about 10-15 minutes prior for the same.





Patient went asystole again and went into PEA then received epinephrine and 1 


amp of bicarb x 2 and his pulse returned.





The nurse has ordered a bicarb drip.





Patient has blood pressure 105/64 with heart rate of 122 A. YOLI Vee DO         Jan 27, 2019 08:14

## 2019-01-27 NOTE — NUR
Pt started going bradycardic low 50's, nonresponsive, weak pulse. Code blue was called. Dr. Arenas arrived on site, atropine x1, bicarb x2, and epi x2 was given. Patient was intubated 
7.5 ETT. 

Dr. Jovel, and family were notified.

## 2019-01-27 NOTE — NUR
CODE BLUE NOTE: pt coded at 11:46 while pt's family was in the room visiting. Pt was able to 
achieve ROSC at 11:58. Pt's family decided to leave, but said for us to call if anything 
further happened. Pt coded again at 12:26(see code blue record). TOKEVIN called at 12:54. RN 
called & left a message with Lili (pt's sister). Awaiting a call back. All physicians on 
pt's case notified of pt's death.

## 2019-01-27 NOTE — NUR
Called Pt's family/sister @ [phone];  spoke to Lili Peace, RE: Pt's critical condition. 
 Pulmonary/Cardiac arrest event and placed on the Ventilator. will follow up post 
resuscitation order

## 2019-01-27 NOTE — EN
Date/Time of Note


Date/Time of Note


DATE: 1/27/19 


TIME: 14:31





ER Progress Note


CODE BLUE








The patient is coding again.  The patient start tends to bradycardia down to the


60s then will lose his pulse and go into PEA.





I did discuss with the family about further CPR and they are not sure what to do


they want to try to give a 24 hours to see if he would survive or not.  They 


said he wanted to discuss with family members on whether or not to make him a 


DNR.





My arrival the patient is having active CPR has had epi and bicarb he is maxed 


out on all pressors








 Const:      [Well-developed, well-nourished]


 Head:        [Atraumatic, normocephalic]


 Eyes:       [Normal Conjunctiva, Vanessa fixed and dilated but has had 


atropine]


 ENT:         [Normal External Ears, intubated.]


 Neck:        [Full range of motion.  No meningismus, no lymphadenopathy.]


 Resp:         [Clear to auscultation bilaterally, no wheezing, rhonchi, rales]


 Cardio:       No spontaneous


 Abd:         [Soft, n, non distended.


 Skin:         [No petechiae or rashes, no ecchymosis , no maculopapular rash]


 Back:        Not examined


 Ext:          [No cyanosis, , normal inspection, 


 Neur:        GCS of 3


 Psych:        Able to obtain











Patient underwent ACLS protocol and then he would get epinephrine and would 


become tachycardic briefly in A. fib with RVR that his pulse are gradually 


declining until he would lose his pulse; the epinephrine would wear off.  We did


 multiple rounds of this until his heart went into PEA and finally was 


nonresponsive.  He was deemed at that time that he is becoming drug depend on 


epinephrine I lasting a few minutes before losing pulse again.  Patient was 


doing this for 30 minutes








Patient's code was called and time of death at 12:54 PM











Critical Care Time:     40 minutes





Treatments/Evaluations: Close monitoring and treatment of unstable vital signs, 


cardiorespiratory, and neurologic status, while maintaining tight balance of 


fluid, respiratory, and cardiac interventions. This time includes discussing the


 case with the patient and the patient's family. This time does not include all 


procedures stated elsewhere in this record. This time also includes reviewing 


old records, labs and radiological studies. This time includes examining and re-


examining the patient. Additionally, this time also includes arranging care with


 admitting and consulting physicians.











YOLI MIRANDA DO         Jan 27, 2019 14:39

## 2019-01-27 NOTE — NUR
Pt began opening eyes extremely wide rhythmically. No other s/s of movement from patient, 
even with noxious stimuli. Dr. Jovel at beside aware. MD ordered Ativan 1mg IVP Q4h prn. 
Ativan given with good results. Rhythmic eye movements stopped

## 2019-01-27 NOTE — CONS
Assessment/Plan


Assessment/Plan


Hospital Course (Demo Recall)


1) Fall versus syncope


2) chronic afib - s/p RVR and last night bradycardia


3) LV dysfunction


4) no gross fluid overload


5) no ischemia in recent stress test


6) HTN > hypotension yesterday


7) s/p hypoglycemia





now s/p cardiac arrest with severe deterioration in status conitnue supportive 


care





Consultation Date/Type/Reason


Admit Date/Time


Jan 25, 2019 at 13:18


Initial Consult Date


1/26/19


Type of Consult


Cardiology


Requesting Provider:  LAURIE HARE MD


Date/Time of Note


DATE: 1/27/19 


TIME: 09:29





24 HR Interval Summary


Free Text/Dictation


PEA arrest overnight now on maximum of four pressors very limited response


tonic clonic movements


Subjective hx not possible:  pt non-verbal





Exam/Review of Systems


Vital Signs


Vitals





Vital Signs


  Date      Temp  Pulse  Resp  B/P (MAP)   Pulse Ox  O2          O2 Flow    FiO2


Time                                                 Delivery    Rate


   1/27/19                                                                    50


     09:00


   1/27/19          116    13      105/76


     08:01                           (86)


   1/27/19                                      100


     06:10


   1/27/19  97.8                                     High Flow


     04:01


   1/26/19                                                             8.0


     23:00








Intake and Output





1/26/19 1/26/19 1/27/19





1414:59


22:59


06:59





IntakeIntake Total


920 ml


1100 ml


800 ml





OutputOutput Total


50 ml





BalanceBalance


920 ml


1100 ml


750 ml














Exam


Neurological:  unresponsive





Labs


Result Diagram:  


1/27/19 0430                                                                    


           1/27/19 0710





Results 24hrs





Laboratory Tests


Test
             1/26/19
12:23    1/26/19
16:38  1/26/19
16:40    1/26/19
21:15


Bedside Glucose           188               88                              79


Hemoglobin                                              12.6  L


Hematocrit                                              38.8  L


Prothrombin Time                                       39.0  #H


Prothrombin Time                                          3.0


Ratio


INR               
              
                      4.01  
  



International


Normalized
Ratio


Activated         
              
                     35.8  H
  



Partial
Thrombop


last Time


Test
             1/26/19
23:26    1/27/19
01:11  1/27/19
01:27    1/27/19
04:30


Random Cortisol          67.4


Bedside Glucose                  > 595  *H                105


White Blood                                                                9.8


Count


Red Blood Count                                                          3.99  L


Hemoglobin                                                               12.6  L


Hematocrit                                                               38.8  L


Mean Corpuscular                                                          97.2


Volume


Mean Corpuscular                                                          31.6


Hemoglobin


Mean Corpuscular  
              
                
                      32.5  



Hemoglobin
Alexus


nt


Red Cell                                                                 17.4  H


Distribution


Width


Platelet Count                                                            100  L


Mean Platelet                                                            12.4  H


Volume


Immature                                                                1.300  H


Granulocytes %


Neutrophils %                                                             74.2


Lymphocytes %                                                             18.3


Monocytes %                                                                6.0


Eosinophils %                                                              0.0


Basophils %                                                                0.2


Nucleated Red                                                            13.9  H


Blood Cells %


Immature                                                                0.130  H


Granulocytes #


Neutrophils #                                                              7.3


Lymphocytes #                                                              1.8


Monocytes #                                                                0.6


Eosinophils #                                                              0.0


Basophils #                                                                0.0


Nucleated Red                                                             1.4  H


Blood Cells #


Sodium Level                                                               142


Potassium Level                                                            4.8


Chloride Level                                                             106


Carbon Dioxide                                                             20  L


Level


Anion Gap                                                                  16  H


Blood Urea                                                                 73  H


Nitrogen


Creatinine                                                               1.71  H


Est Glomerular    
              
                
                



Filtrat


Rate
mL/min


Glucose Level                                                              111


Calcium Level                                                              8.5


Test
             1/27/19
04:52    1/27/19
05:00  1/27/19
07:10    1/27/19
08:00


Bedside Glucose           113


Blood Gas         
              Blood arterial
  
              Blood arterial



Specimen Source



Arterial Blood    
              1/27/2019
5:45:  
              1/27/2019
8:28:


Date Drawn
                                38 AM                           51 AM


Arterial Blood    
                     7.438  
  
                   7.102  *L



pH


(Temp
corrected)


Arterial Blood    
                     13.5  L
  
                     46.7  H



pCO2


(Temp
correct)


Arterial Blood    
                     76.9  L
  
                    312.7  H



pO2


(Temp
corrected)


Arterial Blood                           8.9  *L                         14.2  L


HCO3


Arterial Blood                          -12.0  L                        -14.9  L


Base Excess


Arterial Blood    
                     94.8  L
  
                      99.1  



Oxygen
Saturatio


n


Alex Test                       ACCEPTAB                        ACCEPTAB


Arterial Blood    
              Left Radial  
   
              Right Radial  



Gas


Puncture
Site


Arterial          
                       0.1  
  
                       0.3  



Blood
Carboxyhem


oglobin


Arterial Blood                             0.2                             0.4


Methemoglobin


Blood Gas A-a O2                        157.0  H                        353.6  H


Differential


Oxyhemoglobin                             94.5                            98.4


Percent


Blood Gas                                 37.0                            37.0


Temperature


Blood Gas Actual  
                        29  
  
                        18  



Respiration
Rate


Blood Gas                        HFNC                            VENT - AC


Modality


FiO2                                      35.0                           100.0


Blood Gas         
              MINERVA VELÁZQUEZ RN  



Critical Value


Read
Back


Blood Gas                        JOSE MCCORMICK


Notified Whom


Blood Gas         
              1/27/2019
5:54:  
              1/27/2019
8:55:


Notified Time
                             23 AM                           30 AM


Sodium Level                                              140


Potassium Level                                           5.0


Chloride Level                                            101


Carbon Dioxide                                            16  L


Level


Anion Gap                                                23  #H


Blood Urea                                                65  H


Nitrogen


Creatinine                                              1.80  H


Est Glomerular    
              
                  
            



Filtrat


Rate
mL/min


Glucose Level                                          455  #*H


Calcium Level                                            7.5  L


Troponin I                                            0.189  *H


Blood Gas                                                                 12.0


Respiration Rate


Blood Gas Tidal                                                          500.0


Volume


Blood Gas Low                                                              5.0


PEEP Setting


Test
             1/27/19
08:43  
                
              



Bedside Glucose           110








Medications


Medications





Current Medications


IV Flush (NS 3 ml) 3 ml PER PROTOCOL IV ;  Start 1/23/19 at 22:30


Ondansetron HCl (Zofran Tab) 4 mg Q6H  PRN PO nausea;  Start 1/23/19 at 22:30


Acetaminophen (Tylenol Tab) 650 mg Q6H  PRN PO fever Last administered on 


1/24/19at 14:52; Admin Dose 650 MG;  Start 1/23/19 at 22:30


Docusate Sodium (Colace) 100 mg Q12H  PRN PO CONSTIPATION;  Start 1/23/19 at 


22:30


Magnesium Hydroxide (Milk Of Mag) 30 ml DAILY  PRN PO indigestion;  Start 1/23/1


9 at 22:30


Famotidine (Pepcid) 20 mg Q24H PO  Last administered on 1/26/19at 22:14; Admin 


Dose 20 MG;  Start 1/23/19 at 23:00


Aspirin (Halfprin) 81 mg DAILY PO  Last administered on 1/26/19at 08:35; Admin 


Dose 81 MG;  Start 1/24/19 at 09:00


Fluticasone/ Vilanterol (Breo Ellipta 100-25 Mcg Inh) 1 inh DAILY INH  Last 


administered on 1/25/19at 08:19; Admin Dose 1 INH;  Start 1/24/19 at 09:00


Metoprolol Succinate (Toprol Xl) 200 mg BID PO  Last administered on 1/26/19at 


08:35; Admin Dose 200 MG;  Start 1/23/19 at 23:00;  Status Hold


Tiotropium Bromide (Spiriva) 1 inh DAILY INH  Last administered on 1/24/19at 


13:44; Admin Dose 1 INH;  Start 1/24/19 at 09:00


Digoxin (Digoxin) 0.125 mg DAILY@13 PO  Last administered on 1/24/19at 13:42; 


Admin Dose 0.125 MG;  Start 1/24/19 at 13:00;  Status Hold


Dextrose 1,000 ml @  100 mls/hr Q10H IV  Last administered on 1/27/19at 04:13; 


Admin Dose 100 MLS/HR;  Start 1/25/19 at 10:30


Miscellaneous Information 1 ea NOTE XX ;  Start 1/25/19 at 20:30


Glucose (Glutose) 15 gm Q15M  PRN PO DECREASED GLUCOSE;  Start 1/25/19 at 20:30


Glucose (Glutose) 22.5 gm Q15M  PRN PO DECREASED GLUCOSE;  Start 1/25/19 at 


20:30


Dextrose (D50w Syringe) 25 ml Q15M  PRN IV DECREASED GLUCOSE;  Start 1/25/19 at 


20:30


Dextrose (D50w Syringe) 50 ml Q15M  PRN IV DECREASED GLUCOSE;  Start 1/25/19 at 


20:30


Glucagon (Glucagen) 1 mg Q15M  PRN IM DECREASED GLUCOSE;  Start 1/25/19 at 20:30


Glucose (Glutose) 15 gm Q15M  PRN BUCCAL DECREASED GLUCOSE;  Start 1/25/19 at 


20:30


Metoprolol Tartrate (Lopressor) 5 mg Q6 IV  Last administered on 1/26/19at 


06:34; Admin Dose 5 MG;  Start 1/26/19 at 00:00;  Status Hold


Diagnostic Test (Pha) (Accu-Chek) 1 ea Q4 XX  Last administered on 1/27/19at 


04:54; Admin Dose 1 EA;  Start 1/26/19 at 09:00


Sodium Bicarbonate 100 meq/Dextrose 1,000 ml @  100 mls/hr Q10H IV  Last 


administered on 1/27/19at 08:24; Admin Dose 100 MLS/HR;  Start 1/27/19 at 08:00


Epinephrine 4 mg/ Dextrose 250 ml @  3.75 mls/hr TITRATE IV  Last administered 


on 1/27/19at 08:25; Admin Dose 37.5 MLS/HR;  Start 1/27/19 at 08:00


Dopamine HCl/ Dextrose 250 ml @  4.5 mls/hr TITRATE IV  Last administered on 


1/27/19at 08:26; Admin Dose 45 MLS/HR;  Start 1/27/19 at 08:00;  Stop 1/27/19 at


10:00


Phenylephrine HCl 250 ml @  75 mls/hr TITRATE IV  Last administered on 1/27/19at


07:45; Admin Dose 225 MLS/HR;  Start 1/27/19 at 08:30;  Stop 1/27/19 at 10:00


Norepinephrine 250 ml @  1.875 mls/ hr TITRATE IV  Last administered on 


1/27/19at 08:40; Admin Dose 56.25 MLS/HR;  Start 1/27/19 at 08:30;  Stop 1/27/19


at 10:00


Eye Lubricant (Akwa Oint) 1 applic Q6 BOTH EYES ;  Start 1/27/19 at 08:30


Eye Lubricant (Artificial Tears Oph) 2 drop QID BOTH EYES ;  Start 1/27/19 at 


09:00


Norepinephrine 32 mg/Dextrose 250 ml @  0.47 mls/hr TITRATE IV ;  Start 1/27/19 


at 10:00


Phenylephrine HCl 80 mg/Dextrose 250 ml @  18.75 mls/ hr TITRATE IV ;  Start 


1/27/19 at 09:00


Dopamine HCl 1600 mg/Dextrose 250 ml @  1.13 mls/hr TITRATE IV ;  Start 1/27/19 


at 10:00


Lorazepam (Ativan) 1 mg Q4H  PRN IV SEIZURES;  Start 1/27/19 at 09:30;  Status 


LORENZO APODACA MD            Jan 27, 2019 09:31

## 2019-01-27 NOTE — EN
Date/Time of Note


Date/Time of Note


DATE: 1/27/19 


TIME: 06:53





ER Progress Note


CODE BLUE/intubation





I was called to the ICU for intubation for a patient who was brought in with 


altered mental status and syncope.





he began having agonal respirations and required airway.





Endotracheal Intubation by me: 


Pre assessment performed.  See preceding note for details.  


Pre-oxygenation performed with 100% oxygen


   RSI:      Performed w/o complication or hypoxic events. Medications as 


ordered.


   Blade:      Stores MAC 4


   ET Tube:   7.5 cm


   Depth:      22 cm at the lip





Intubation confirmed by colorimetric CO2, equal breath sounds, quiet over the 


stomach.








 Const:      Well-developed, well-nourished


 Head:        Atraumatic, normocephalic


 Eyes:       Normal Conjunctiva, PERRLA, EOMI, normal sclera, no nystagmus


 ENT:         Normal External Ears, Nose and Mouth, moist mucus membranes.


 Neck:        Full range of motion.  No meningismus.


 Resp:         Clear to auscultation bilaterally, no wheezing, rhonchi, rales


 Cardio:       Tachycardia irregular rate and rhythm, no murmurs, S1 S2 present


 Abd:         Soft, non tender x 4, non distended. Normal bowel sounds, no 


guarding or rebound, no pulsitile abdominal masses or bruits


 Skin:         No petechiae or rashes, no ecchymosis , no maculopapular rash


 Back:        Not examined


 Ext:          No cyanosis, or edema, normal inspection, vascularly intact x 4


 Neur:        GCS of 3


 Psych:        Unable to obtain

















Shortly thereafter the patient had a wide-complex bradycardia and he was given 


some atropine just before intubation then he after intubation had a pulse check 


which showed no pulse was palpable so we began CPR.





He was given epinephrine 1 mg IV and 2 A of sodium bicarb because his CO2 was 8 


this morning.





At pulse check he was in V. fib he was then defibrillated x1 CPR was continued 


for another minute and another pulse check revealed he had a heart rate with a 


palpable pulse.  He was in atrial fibrillation with a heart rate of 130-140, 


blood pressure was 137/118





.





Critical Care Time:     30 minutes





Treatments/Evaluations: Close monitoring and treatment of unstable vital signs, 


cardiorespiratory, and neurologic status, while maintaining tight balance of 


fluid, respiratory, and cardiac interventions. This time includes discussing the


case with the patient and the patient's family. This time does not include all 


procedures stated elsewhere in this record. This time also includes reviewing 


old records, labs and radiological studies. This time includes examining and re-


examining the patient. Additionally, this time also includes arranging care with


admitting and consulting physicians. 








Condition: Critical





Diagnosis: Cardiorespiratory failure, cardiac arrest











YOLI MIRANDA DO         Jan 27, 2019 06:59

## 2019-01-27 NOTE — NUR
JENNIFER Note: Follow up 



SW requested follow up with patient as patient has expressed concern about obtaining his car 
keys from the [facility] where patient was picked up by ambulance.



SW met with bedside RN and patient on the unit. Patient is intubated and coded 3 times this 
morning per RN. The patients sister, Lili ([phone]) is aware of the patients critical 
status and is en route to [facility] from [place] with an ETA of 3 hours. 



Given patients critical status, locating his car keys is not pertinent at this time. 
Patients family/sister can assist with locating the patients key on arrival. Previous 
SWers have reached out to the [facility] where the patient was picked up and management has not had 
any keys returned.

## 2019-01-27 NOTE — EN
Date/Time of Note


Date/Time of Note


DATE: 1/27/19 


TIME: 08:14





ER Progress Note


CODE BLUE





CODE BLUE was called on this patient who is here for syncope with altered mental


status.  He is currently on pressor support.  His sister wants him full code





I seen the patient about 5-10  minutes prior for the same.





Patient went into PEA then received epinephrine and 1 amp of bicarb and his 


pulse returned.





The nurse has ordered a bicarb drip, which is just arrived once his pulse 


returned





Gave him an additional amp of bicarb when his pulse and blood pressure returned





 Const:      Well-developed, well-nourished


 Head:        Atraumatic, normocephalic


 Eyes:       Normal Conjunctiva, PERRLA, EOMI, normal sclera, no nystagmus


 ENT:         [Normal External Ears, intubated


 Neck:        Full range of motion.  No meningismus


 Resp:         Clear to auscultation bilaterally, no wheezing, rhonchi, rales


 Cardio:       Tachycardia irregular rate and rhythm, S1 S2 present


 Abd:         [Soft, non distended. 


 Skin:         No petechiae or rashes, no ecchymosis , no maculopapular rash


 Back:        Not examined


 Ext:          No cyanosis, or edema, FROM x 4, normal inspection, 


neurovascularly intact x 4


 Neur:        GCS of 3


 Psych:        Unable to obtain











YOLI MIRANDA DO         Jan 27, 2019 08:17

## 2019-01-28 LAB — INSULIN: 10.7 UIU/ML (ref 2–19.6)

## 2019-01-28 NOTE — RADRPT
Vent Rate: 52 bpm

RR Interval: 0 msec

SD Interval: 0 msec

QRS Duration: 90 msec

QT Interval: 526 msec

QTC Interval: 489 msec

P-R-T Axis: 0 - 1 - 0 degrees

 

 Atrial fibrillation with slow ventricular response with premature 

ventricular or aberrantly conducted complexes

Baseline wander affecting accurate interpretation

 

Electronically Signed By: Eder Alvarez

[id number]

## 2019-01-28 NOTE — PN
DATE:  01/27/2019

 

 

PROGRESS NOTE

 

SUBJECTIVE:  Patient developed bradycardia followed by asystole about 6:30 in the morning.  Code blue
 was called and he was intubated.  The patient received epinephrine.  At this time, he is on maximum 
doses of 3 different pressors.  Systolic blood pressure is about 79.

 

PHYSICAL EXAMINATION

VITAL SIGNS:  He is afebrile.

LUNGS:  Bilateral rhonchi.

HEART:  Rapid rate. Irregularly irregular.

ABDOMEN:  Soft, normoactive bowel sounds.

EXTREMITIES:  No clubbing, cyanosis, or edema.

 

ASSESSMENT AND PLAN:

1.  A 73-year-old male status post cardiac arrest.

2.  Metabolic acidosis.

3.  Acute on chronic kidney injury.

4.  Persistent hypoglycemia, rule out insulinoma.

5.  Chronic atrial fibrillation.

6.  Chronic obstructive pulmonary disease.

7.  Poor prognosis.  Continue full supportive care.  Dr. Murillo was then notified.

8.  The case was discussed with the patient's siblings.  They understand his poor prognosis. 

8.  Pulmonary, endocrine and cardiology followup.

 

 

Dictated By: LAURIE LEVINE/ELADIO

DD:    01/27/2019 08:40:22

DT:    01/27/2019 12:01:13

Conf#: [id number]

DID#:  [id number]

CC: PAM ROCHA MD; GRIS SIMPSON MD;*EndCC*

## 2019-01-28 NOTE — DES
DATE OF ADMISSION: 01/25/2019

DATE OF DEATH:     01/27/2019

 

DIAGNOSES PRIOR TO DEATH:

1.  Multiple cardiac arrests.

2.  Atherosclerotic heart disease.

3.  Persistent hypoglycemia.

4.  Metabolic acidosis.

5.  Acute on chronic kidney injury.

6.  Chronic atrial fibrillation.

7.  Severe chronic obstructive pulmonary disease.

 

HOSPITAL COURSE:  A 73-year-old male with multiple medical problems including hypertension, chronic a
trial fibrillation and end-stage COPD was readmitted after he had a ground level fall due to hypoglyc
emia.  The patient was noted to have persistent hypoglycemia despite D10 infusion.  He was monitored 
in the ICU.  The patient was seen by multiple specialists.  He developed cardiac arrest and coded sev
eral times.  Family members were notified.  The patient coded last at 12:26 p.m. on Sunday, January 2
7, 2019.  TIME OF DEATH was 12:54 p.m.  His sister was notified of the patient's demise.

 

 

Dictated By: LAURIE HARE MD

 

SK/NTS

DD:    01/28/2019 10:45:29

DT:    01/28/2019 13:50:40

Conf#: [id number]

DID#:  [id number]

CC: GRIS SIMPSON MD;*EndCC*

## 2019-01-28 NOTE — RADRPT
Vent Rate: 113 bpm

RR Interval: 0 msec

SD Interval: 160 msec

QRS Duration: 90 msec

QT Interval: 346 msec

QTC Interval: 474 msec

P-R-T Axis: 82 - -58 - 87 degrees

 

 Sinus tachycardia

 Left axis deviation

 RSR apos; orattern in V1 suggests right ventricular conduction delay

 Septal infarct , age undetermined

 ST  amp; T wave abnormality, consider anterolateral ischemia

 Abnormal ECG

 

Electronically Signed By: Eder Alvarez

[id number]

## 2019-01-28 NOTE — RADRPT
Vent Rate: 41 bpm

RR Interval: 0 msec

SC Interval: 0 msec

QRS Duration: 88 msec

QT Interval: 562 msec

QTC Interval: 463 msec

P-R-T Axis: 0 - -14 - -87 degrees

 

Probable Sinus rhythm

Baseline wander affecting accurate interpretation

 Abnormal ECG

 

Electronically Signed By: Eder Alvarez

[id number]